# Patient Record
Sex: FEMALE | Race: WHITE | NOT HISPANIC OR LATINO | Employment: FULL TIME | URBAN - METROPOLITAN AREA
[De-identification: names, ages, dates, MRNs, and addresses within clinical notes are randomized per-mention and may not be internally consistent; named-entity substitution may affect disease eponyms.]

---

## 2017-07-12 ENCOUNTER — HOSPITAL ENCOUNTER (OUTPATIENT)
Dept: RADIOLOGY | Facility: HOSPITAL | Age: 62
Discharge: HOME | End: 2017-07-12
Payer: COMMERCIAL

## 2017-07-12 ENCOUNTER — TRANSCRIBE ORDERS (OUTPATIENT)
Dept: ADMINISTRATIVE | Facility: HOSPITAL | Age: 62
End: 2017-07-12

## 2017-07-12 DIAGNOSIS — R05.9 COUGH: Primary | ICD-10-CM

## 2017-07-12 DIAGNOSIS — R05.9 COUGH: ICD-10-CM

## 2017-07-12 PROCEDURE — 71020 HB CHEST X-RAY 2VW FRONTAL&LATL: CPT

## 2017-11-16 ENCOUNTER — APPOINTMENT (OUTPATIENT)
Dept: LAB | Facility: HOSPITAL | Age: 62
End: 2017-11-16
Payer: COMMERCIAL

## 2017-11-16 ENCOUNTER — TRANSCRIBE ORDERS (OUTPATIENT)
Dept: ADMINISTRATIVE | Facility: HOSPITAL | Age: 62
End: 2017-11-16

## 2017-11-16 DIAGNOSIS — E78.2 MIXED HYPERLIPIDEMIA: ICD-10-CM

## 2017-11-16 DIAGNOSIS — E78.2 MIXED HYPERLIPIDEMIA: Primary | ICD-10-CM

## 2017-11-16 LAB
ANION GAP SERPL CALCULATED.3IONS-SCNC: 7 MMOL/L (ref 4–13)
BUN SERPL-MCNC: 13 MG/DL (ref 5–25)
CALCIUM SERPL-MCNC: 8.8 MG/DL (ref 8.3–10.1)
CHLORIDE SERPL-SCNC: 102 MMOL/L (ref 100–108)
CHOLEST SERPL-MCNC: 215 MG/DL (ref 50–200)
CO2 SERPL-SCNC: 30 MMOL/L (ref 21–32)
CREAT SERPL-MCNC: 0.65 MG/DL (ref 0.6–1.3)
ERYTHROCYTE [DISTWIDTH] IN BLOOD BY AUTOMATED COUNT: 12.9 % (ref 11.6–15.1)
GFR SERPL CREATININE-BSD FRML MDRD: 97 ML/MIN/1.73SQ M
GLUCOSE P FAST SERPL-MCNC: 102 MG/DL (ref 65–99)
HCT VFR BLD AUTO: 38.2 % (ref 34.8–46.1)
HDLC SERPL-MCNC: 68 MG/DL (ref 40–60)
HGB BLD-MCNC: 12.4 G/DL (ref 11.5–15.4)
LDLC SERPL CALC-MCNC: 135 MG/DL (ref 0–100)
MCH RBC QN AUTO: 33.2 PG (ref 26.8–34.3)
MCHC RBC AUTO-ENTMCNC: 32.5 G/DL (ref 31.4–37.4)
MCV RBC AUTO: 102 FL (ref 82–98)
PLATELET # BLD AUTO: 219 THOUSANDS/UL (ref 149–390)
PMV BLD AUTO: 9.2 FL (ref 8.9–12.7)
POTASSIUM SERPL-SCNC: 4.2 MMOL/L (ref 3.5–5.3)
RBC # BLD AUTO: 3.74 MILLION/UL (ref 3.81–5.12)
SODIUM SERPL-SCNC: 139 MMOL/L (ref 136–145)
TRIGL SERPL-MCNC: 59 MG/DL
TSH SERPL DL<=0.05 MIU/L-ACNC: 4.75 UIU/ML (ref 0.36–3.74)
WBC # BLD AUTO: 6.5 THOUSAND/UL (ref 4.31–10.16)

## 2017-11-16 PROCEDURE — 80048 BASIC METABOLIC PNL TOTAL CA: CPT

## 2017-11-16 PROCEDURE — 80061 LIPID PANEL: CPT

## 2017-11-16 PROCEDURE — 36415 COLL VENOUS BLD VENIPUNCTURE: CPT

## 2017-11-16 PROCEDURE — 85027 COMPLETE CBC AUTOMATED: CPT

## 2017-11-16 PROCEDURE — 84443 ASSAY THYROID STIM HORMONE: CPT

## 2018-01-05 ENCOUNTER — HOSPITAL ENCOUNTER (EMERGENCY)
Facility: HOSPITAL | Age: 63
Discharge: HOME | End: 2018-01-05
Attending: EMERGENCY MEDICINE | Admitting: EMERGENCY MEDICINE
Payer: COMMERCIAL

## 2018-01-05 ENCOUNTER — APPOINTMENT (EMERGENCY)
Dept: CT IMAGING | Facility: HOSPITAL | Age: 63
End: 2018-01-05
Payer: COMMERCIAL

## 2018-01-05 ENCOUNTER — APPOINTMENT (EMERGENCY)
Dept: RADIOLOGY | Facility: HOSPITAL | Age: 63
End: 2018-01-05
Payer: COMMERCIAL

## 2018-01-05 VITALS
TEMPERATURE: 98.3 F | SYSTOLIC BLOOD PRESSURE: 132 MMHG | BODY MASS INDEX: 18.88 KG/M2 | DIASTOLIC BLOOD PRESSURE: 70 MMHG | OXYGEN SATURATION: 98 % | WEIGHT: 110 LBS | HEART RATE: 100 BPM | RESPIRATION RATE: 19 BRPM

## 2018-01-05 DIAGNOSIS — K86.89 PANCREATIC MASS: Primary | ICD-10-CM

## 2018-01-05 DIAGNOSIS — M54.9 BACK PAIN: ICD-10-CM

## 2018-01-05 LAB
ALBUMIN SERPL BCP-MCNC: 4.1 G/DL (ref 3.5–5)
ALP SERPL-CCNC: 79 U/L (ref 46–116)
ALT SERPL W P-5'-P-CCNC: 30 U/L (ref 12–78)
ANION GAP SERPL CALCULATED.3IONS-SCNC: 6 MMOL/L (ref 4–13)
AST SERPL W P-5'-P-CCNC: 25 U/L (ref 5–45)
BACTERIA UR QL AUTO: ABNORMAL /HPF
BASOPHILS # BLD AUTO: 0.03 THOUSANDS/ΜL (ref 0–0.1)
BASOPHILS NFR BLD AUTO: 0 % (ref 0–1)
BILIRUB DIRECT SERPL-MCNC: 0.15 MG/DL (ref 0–0.2)
BILIRUB SERPL-MCNC: 0.6 MG/DL (ref 0.2–1)
BILIRUB UR QL STRIP: NEGATIVE
BUN SERPL-MCNC: 12 MG/DL (ref 5–25)
CALCIUM SERPL-MCNC: 9 MG/DL (ref 8.3–10.1)
CHLORIDE SERPL-SCNC: 97 MMOL/L (ref 100–108)
CLARITY UR: CLEAR
CO2 SERPL-SCNC: 31 MMOL/L (ref 21–32)
COLOR UR: YELLOW
CREAT SERPL-MCNC: 0.68 MG/DL (ref 0.6–1.3)
EOSINOPHIL # BLD AUTO: 0.03 THOUSAND/ΜL (ref 0–0.61)
EOSINOPHIL NFR BLD AUTO: 0 % (ref 0–6)
ERYTHROCYTE [DISTWIDTH] IN BLOOD BY AUTOMATED COUNT: 12.7 % (ref 11.6–15.1)
GFR SERPL CREATININE-BSD FRML MDRD: 95 ML/MIN/1.73SQ M
GLUCOSE SERPL-MCNC: 96 MG/DL (ref 65–140)
GLUCOSE UR STRIP-MCNC: NEGATIVE MG/DL
HCT VFR BLD AUTO: 40.7 % (ref 34.8–46.1)
HGB BLD-MCNC: 13.5 G/DL (ref 11.5–15.4)
HGB UR QL STRIP.AUTO: ABNORMAL
KETONES UR STRIP-MCNC: NEGATIVE MG/DL
LEUKOCYTE ESTERASE UR QL STRIP: NEGATIVE
LIPASE SERPL-CCNC: 150 U/L (ref 73–393)
LYMPHOCYTES # BLD AUTO: 1.18 THOUSANDS/ΜL (ref 0.6–4.47)
LYMPHOCYTES NFR BLD AUTO: 10 % (ref 14–44)
MCH RBC QN AUTO: 33.5 PG (ref 26.8–34.3)
MCHC RBC AUTO-ENTMCNC: 33.2 G/DL (ref 31.4–37.4)
MCV RBC AUTO: 101 FL (ref 82–98)
MONOCYTES # BLD AUTO: 1.11 THOUSAND/ΜL (ref 0.17–1.22)
MONOCYTES NFR BLD AUTO: 10 % (ref 4–12)
NEUTROPHILS # BLD AUTO: 9.11 THOUSANDS/ΜL (ref 1.85–7.62)
NEUTS SEG NFR BLD AUTO: 80 % (ref 43–75)
NITRITE UR QL STRIP: NEGATIVE
NON-SQ EPI CELLS URNS QL MICRO: ABNORMAL /HPF
PH UR STRIP.AUTO: 5.5 [PH] (ref 4.5–8)
PLATELET # BLD AUTO: 238 THOUSANDS/UL (ref 149–390)
PMV BLD AUTO: 10.1 FL (ref 8.9–12.7)
POTASSIUM SERPL-SCNC: 4.2 MMOL/L (ref 3.5–5.3)
PROT SERPL-MCNC: 8.8 G/DL (ref 6.4–8.2)
PROT UR STRIP-MCNC: NEGATIVE MG/DL
RBC # BLD AUTO: 4.03 MILLION/UL (ref 3.81–5.12)
RBC #/AREA URNS AUTO: ABNORMAL /HPF
SODIUM SERPL-SCNC: 134 MMOL/L (ref 136–145)
SP GR UR STRIP.AUTO: 1.02 (ref 1–1.03)
UROBILINOGEN UR QL STRIP.AUTO: 0.2 E.U./DL
WBC # BLD AUTO: 11.46 THOUSAND/UL (ref 4.31–10.16)
WBC #/AREA URNS AUTO: ABNORMAL /HPF

## 2018-01-05 PROCEDURE — 96361 HYDRATE IV INFUSION ADD-ON: CPT

## 2018-01-05 PROCEDURE — 80048 BASIC METABOLIC PNL TOTAL CA: CPT | Performed by: EMERGENCY MEDICINE

## 2018-01-05 PROCEDURE — 80076 HEPATIC FUNCTION PANEL: CPT | Performed by: EMERGENCY MEDICINE

## 2018-01-05 PROCEDURE — 83690 ASSAY OF LIPASE: CPT | Performed by: EMERGENCY MEDICINE

## 2018-01-05 PROCEDURE — 74177 CT ABD & PELVIS W/CONTRAST: CPT

## 2018-01-05 PROCEDURE — 71046 X-RAY EXAM CHEST 2 VIEWS: CPT

## 2018-01-05 PROCEDURE — 96374 THER/PROPH/DIAG INJ IV PUSH: CPT

## 2018-01-05 PROCEDURE — 85025 COMPLETE CBC W/AUTO DIFF WBC: CPT | Performed by: EMERGENCY MEDICINE

## 2018-01-05 PROCEDURE — 96375 TX/PRO/DX INJ NEW DRUG ADDON: CPT

## 2018-01-05 PROCEDURE — 36415 COLL VENOUS BLD VENIPUNCTURE: CPT | Performed by: EMERGENCY MEDICINE

## 2018-01-05 PROCEDURE — 81001 URINALYSIS AUTO W/SCOPE: CPT | Performed by: EMERGENCY MEDICINE

## 2018-01-05 PROCEDURE — 99284 EMERGENCY DEPT VISIT MOD MDM: CPT

## 2018-01-05 RX ORDER — MORPHINE SULFATE 4 MG/ML
4 INJECTION, SOLUTION INTRAMUSCULAR; INTRAVENOUS ONCE
Status: COMPLETED | OUTPATIENT
Start: 2018-01-05 | End: 2018-01-05

## 2018-01-05 RX ORDER — KETOROLAC TROMETHAMINE 30 MG/ML
15 INJECTION, SOLUTION INTRAMUSCULAR; INTRAVENOUS ONCE
Status: COMPLETED | OUTPATIENT
Start: 2018-01-05 | End: 2018-01-05

## 2018-01-05 RX ORDER — FLUOXETINE HYDROCHLORIDE 40 MG/1
40 CAPSULE ORAL DAILY
COMMUNITY
End: 2018-02-05 | Stop reason: SDUPTHER

## 2018-01-05 RX ORDER — DIAZEPAM 5 MG/1
5 TABLET ORAL EVERY 12 HOURS PRN
COMMUNITY
End: 2018-01-25 | Stop reason: SDUPTHER

## 2018-01-05 RX ADMIN — IOHEXOL 100 ML: 350 INJECTION, SOLUTION INTRAVENOUS at 13:46

## 2018-01-05 RX ADMIN — SODIUM CHLORIDE 1000 ML: 0.9 INJECTION, SOLUTION INTRAVENOUS at 12:47

## 2018-01-05 RX ADMIN — MORPHINE SULFATE 4 MG: 4 INJECTION, SOLUTION INTRAMUSCULAR; INTRAVENOUS at 12:41

## 2018-01-05 RX ADMIN — KETOROLAC TROMETHAMINE 15 MG: 30 INJECTION, SOLUTION INTRAMUSCULAR at 12:43

## 2018-01-05 NOTE — DISCHARGE INSTRUCTIONS
Pancreatic Cancer   AMBULATORY CARE:   Pancreatic cancer  starts in the pancreas  The pancreas is located just behind the stomach  It helps digest food by making enzymes  The pancreas also makes hormones, such as insulin, to help balance blood sugar levels  Common symptoms include the following:   · Abdominal or low back pain     · Weight loss without trying    · Loss of appetite, nausea, or vomiting    · Fatigue and weakness    · Dark urine or light-colored bowel movements    · Jaundice (yellowing of the skin and the whites of the eyes)  Call 911 for any of the following:   · You suddenly feel lightheaded and short of breath  · You cough up blood  Seek care immediately if:   · Your arm or leg feels warm, tender, and painful  It may look swollen and red  Contact your healthcare provider if:   · Your pain does not get better, even after you take pain medicine  · Your abdomen is larger than usual     · You have new or worsening nausea or are vomiting  · You have diarrhea, light-colored or oily, foul-smelling bowel movements  · You have new or worsening weight loss, jaundice, or back pain  · You feel depressed, anxious, or unable to cope with your condition  · You have questions or concerns about your condition or care  Treatment for pancreatic cancer  may include any of the following:  · Medicines  may be given to decrease pain or other symptoms  You may need pancreatic enzyme medicine to help your body digest protein, carbohydrates, and fats in your food  You may also need insulin to control your blood sugar level  · Take your medicine as directed  Contact your healthcare provider if you think your medicine is not helping or if you have side effects  Tell him or her if you are allergic to any medicine  Keep a list of the medicines, vitamins, and herbs you take  Include the amounts, and when and why you take them  Bring the list or the pill bottles to follow-up visits   Carry your medicine list with you in case of an emergency  · Radiation therapy  uses x-rays or gamma rays to treat cancer  Radiation kills cancer cells and may stop the cancer from spreading  It is also used to reduce symptoms, such as pain  · Chemotherapy  is medicine that kills cancer cells  Chemotherapy may also be used to shrink the tumor before surgery  · Targeted therapy  is medicine given to kill cancer cells without harming healthy tissue  · Surgery  is done to remove part or all of your pancreas and lymph nodes near your pancreas  It is most often done for tumors that have not spread to other parts of the body  The kind of surgery you need will depend on the size and location of the tumor  Self-care:   · Do not smoke  Nicotine and other chemicals in cigarettes and cigars can cause lung damage  Ask your healthcare provider for information if you currently smoke and need help to quit  E-cigarettes or smokeless tobacco still contain nicotine  Talk to your healthcare provider before you use these products  · Eat small meals throughout the day  You may not feel hungry, but it is important that you eat  Proper nutrition can give you more energy, maintain your weight, and help you feel better  A dietitian can help you find ways to get enough protein, calories, vitamins, and minerals  Ask if you need to take a pancreatic enzyme supplement with meals to help with digestion  · Drink liquids as directed  Ask how much liquid to drink each day and which liquids are best for you  Drink extra liquids to prevent dehydration  You will also need to replace fluid if you are vomiting or have diarrhea from cancer treatments  · Limit or do not drink alcohol as directed  Alcohol can make it difficult to manage your symptoms or side effects of treatment  Ask your oncologist if it is safe for you to drink alcohol  Also ask how much is safe for you to drink  · Exercise as directed    Exercise may increase your energy level and appetite  Ask your healthcare provider how much exercise you need and which exercises are best for you  For more information and support: It may be difficult for you and your family to go through cancer and cancer treatments  Join a support group or talk with others who have gone through treatment  · [address]  [place]    [facility]  Phone: [phone]  Web Address: http://[employer]/  org  · [address] [place] [address]  Phone: 1- 358 - [phone]  Web Address: http://[employer]/  gov  Follow up with your oncologist as directed: You will need to return for more tests  Write down your questions so you remember to ask them during your visits  © 2017 [facility] Information is for End User's use only and may not be sold, redistributed or otherwise used for commercial purposes  All illustrations and images included in CareNotes® are the copyrighted property of A D A M , Inc  or Duran Saldaña  The above information is an  only  It is not intended as medical advice for individual conditions or treatments  Talk to your doctor, nurse or pharmacist before following any medical regimen to see if it is safe and effective for you

## 2018-01-05 NOTE — ED PROVIDER NOTES
History  Chief Complaint   Patient presents with    Back Pain     Patient states she was doing laundry yesteday and hurt her back  PCP gave her oxycontin 10mg but it is not working pain is no longer going down her legs its just across her lower back       History provided by:  Patient   used: No       patient is a 20-year-old female presenting to emergency department with lower back pain  Left-sided  Radiating to the right  Last night radiating into legs  No weakness numbness or tingling  No chest pain  No shortness of breath  No abdominal pain  Did not lose bowel or bladder control  No numbness or tingling  Able to ambulate  Patient has a history of lumbar surgery, spinal fusion, has history of osteochondromas  MDM  Check electrolytes, CBC, CT abdomen pelvis, pain control, re-evaluate      Prior to Admission Medications   Prescriptions Last Dose Informant Patient Reported? Taking? FLUoxetine (PROzac) 40 MG capsule   Yes Yes   Sig: Take 40 mg by mouth daily   diazepam (VALIUM) 5 mg tablet   Yes Yes   Sig: Take 5 mg by mouth every 12 (twelve) hours as needed for anxiety      Facility-Administered Medications: None       Past Medical History:   Diagnosis Date    Psychiatric disorder        Past Surgical History:   Procedure Laterality Date    APPENDECTOMY      BACK SURGERY      CHOLECYSTECTOMY         History reviewed  No pertinent family history  I have reviewed and agree with the history as documented  Social History   Substance Use Topics    Smoking status: Current Every Day Smoker    Smokeless tobacco: Never Used    Alcohol use No        Review of Systems   Constitutional: Negative for chills, diaphoresis and fever  HENT: Negative for congestion and sore throat  Respiratory: Negative for cough, shortness of breath, wheezing and stridor  Cardiovascular: Negative for chest pain, palpitations and leg swelling     Gastrointestinal: Negative for abdominal pain, blood in stool, diarrhea, nausea and vomiting  Genitourinary: Negative for dysuria, frequency and urgency  Musculoskeletal: Positive for back pain  Negative for neck pain and neck stiffness  Skin: Negative for pallor and rash  Neurological: Negative for dizziness, syncope, weakness, light-headedness and headaches  All other systems reviewed and are negative  Physical Exam  ED Triage Vitals   Temperature Pulse Respirations Blood Pressure SpO2   01/05/18 1200 01/05/18 1200 01/05/18 1200 01/05/18 1200 01/05/18 1200   98 3 °F (36 8 °C) (!) 117 20 116/66 96 %      Temp src Heart Rate Source Patient Position - Orthostatic VS BP Location FiO2 (%)   -- 01/05/18 1300 01/05/18 1300 01/05/18 1300 --    Monitor Lying Right arm       Pain Score       01/05/18 1200       8           Orthostatic Vital Signs  Vitals:    01/05/18 1200 01/05/18 1300 01/05/18 1400   BP: 116/66 120/60 132/70   Pulse: (!) 117 (!) 110 100   Patient Position - Orthostatic VS:  Lying Sitting       Physical Exam   Constitutional: She is oriented to person, place, and time  She appears well-developed and well-nourished  HENT:   Head: Normocephalic and atraumatic  Eyes: EOM are normal  Pupils are equal, round, and reactive to light  Neck: Normal range of motion  Neck supple  Cardiovascular: Normal rate, regular rhythm, normal heart sounds and intact distal pulses  Pulmonary/Chest: Effort normal and breath sounds normal  No respiratory distress  Abdominal: Soft  Bowel sounds are normal  There is no tenderness  Musculoskeletal: Normal range of motion  She exhibits no edema or tenderness  Neurological: She is alert and oriented to person, place, and time  No sensory deficit  She exhibits normal muscle tone  Neuro exam with normal  Limits bilateral lower extremities   Skin: Skin is warm and dry  Capillary refill takes less than 2 seconds  No rash noted  No erythema  Vitals reviewed        ED Medications  Medications ketorolac (TORADOL) injection 15 mg (15 mg Intravenous Given 1/5/18 1243)   morphine (PF) 4 mg/mL injection 4 mg (4 mg Intravenous Given 1/5/18 1241)   sodium chloride 0 9 % bolus 1,000 mL (0 mL Intravenous Stopped 1/5/18 1347)   iohexol (OMNIPAQUE) 350 MG/ML injection (SINGLE-DOSE) 100 mL (100 mL Intravenous Given 1/5/18 1346)       Diagnostic Studies  Results Reviewed     Procedure Component Value Units Date/Time    Lipase [[id number]]  (Normal) Collected:  01/05/18 1243    Lab Status:  Final result Specimen:  Blood from Arm, Left Updated:  01/05/18 1429     Lipase 150 u/L     Hepatic function panel [[id number]]  (Abnormal) Collected:  01/05/18 1243    Lab Status:  Final result Specimen:  Blood from Arm, Left Updated:  01/05/18 1429     Total Bilirubin 0 60 mg/dL      Bilirubin, Direct 0 15 mg/dL      Alkaline Phosphatase 79 U/L      AST 25 U/L      ALT 30 U/L      Total Protein 8 8 (H) g/dL      Albumin 4 1 g/dL     Urine Microscopic [[id number]]  (Abnormal) Collected:  01/05/18 1242    Lab Status:  Final result Specimen:  Urine from Urine, Clean Catch Updated:  01/05/18 1337     RBC, UA 2-4 (A) /hpf      WBC, UA None Seen /hpf      Epithelial Cells Occasional /hpf      Bacteria, UA Occasional /hpf     Basic metabolic panel [[id number]]  (Abnormal) Collected:  01/05/18 1243    Lab Status:  Final result Specimen:  Blood from Arm, Left Updated:  01/05/18 1335     Sodium 134 (L) mmol/L      Potassium 4 2 mmol/L      Chloride 97 (L) mmol/L      CO2 31 mmol/L      Anion Gap 6 mmol/L      BUN 12 mg/dL      Creatinine 0 68 mg/dL      Glucose 96 mg/dL      Calcium 9 0 mg/dL      eGFR 95 ml/min/1 73sq m     Narrative:         National Kidney Disease Education Program recommendations are as follows:  GFR calculation is accurate only with a steady state creatinine  Chronic Kidney disease less than 60 ml/min/1 73 sq  meters  Kidney failure less than 15 ml/min/1 73 sq  meters      UA w Reflex to Microscopic w Reflex to Culture [[id number]]  (Abnormal) Collected:  01/05/18 1242    Lab Status:  Final result Specimen:  Urine from Urine, Clean Catch Updated:  01/05/18 1325     Color, UA Yellow     Clarity, UA Clear     Specific Gravity, UA 1 025     pH, UA 5 5     Leukocytes, UA Negative     Nitrite, UA Negative     Protein, UA Negative mg/dl      Glucose, UA Negative mg/dl      Ketones, UA Negative mg/dl      Urobilinogen, UA 0 2 E U /dl      Bilirubin, UA Negative     Blood, UA Trace-lysed (A)    CBC and differential [[id number]]  (Abnormal) Collected:  01/05/18 1243    Lab Status:  Final result Specimen:  Blood from Arm, Left Updated:  01/05/18 1321     WBC 11 46 (H) Thousand/uL      RBC 4 03 Million/uL      Hemoglobin 13 5 g/dL      Hematocrit 40 7 %       (H) fL      MCH 33 5 pg      MCHC 33 2 g/dL      RDW 12 7 %      MPV 10 1 fL      Platelets 269 Thousands/uL      Neutrophils Relative 80 (H) %      Lymphocytes Relative 10 (L) %      Monocytes Relative 10 %      Eosinophils Relative 0 %      Basophils Relative 0 %      Neutrophils Absolute 9 11 (H) Thousands/µL      Lymphocytes Absolute 1 18 Thousands/µL      Monocytes Absolute 1 11 Thousand/µL      Eosinophils Absolute 0 03 Thousand/µL      Basophils Absolute 0 03 Thousands/µL                  CT abdomen pelvis with contrast   Final Result by Micheal Greer MD (01/05 1001)      No acute pathology appreciated to account for the back pain and abdominal pain  Incidental, but very significant finding of pancreatic tail mass concerning for carcinoma        Satisfactory appearance of postop changes of posterior L4-L5 spinal fusion             I personally discussed this study with MIK MUNOZ on 1/5/2018 2:14 PM          Workstation performed: [id number]         XR chest 2 views   Final Result by Emmy Joya MD (01/05 3568)      Emphysematous changes and bibasilar interstitial airspace opacities in a pattern suspicious for interstitial lung disease, similar from July 12, 2017   No alveolar consolidation of density to suggest acute pneumonia  Workstation performed: [id number]         MRI abdomen w wo contrast    (Results Pending)              Procedures  Procedures       Phone Contacts  ED Phone Contact    ED Course  ED Course        had a discusion with pt regarding ct scan findings suspicious for pancreatic cancer, pt aware she need to have outpatient mri and to call GI Monday morning for an office visit                        [facility]  CritCare Time    Disposition  Final diagnoses:   Pancreatic mass   Back pain     Time reflects when diagnosis was documented in both MDM as applicable and the Disposition within this note     Time User Action Codes Description Comment    1/5/2018  2:59 PM Janel Tipton [K86 9] Pancreatic mass     1/5/2018  2:59 PM Janel Tipton [M54 9] Back pain       ED Disposition     ED Disposition Condition Comment    Discharge  Maria Alejandra Osborne discharge to home/self care      Condition at discharge: Good        Follow-up Information     Follow up With Specialties Details Why Contact Info Additional [address] [facility] Emergency Department Emergency Medicine  As needed, If symptoms worsen , worsening pain, fevers, chills, vomiting or feeling worse overall [address] [place] [address]  [address]  [phone] [address] ED, [place] [address], [place], [place], MD [facility] In 3 days  re-evaluation [facility]  [address] [facility] [address]       Elinor Joseph MD Gastroenterology   call on Monday to make an appointment [address] [facility] [address] [facility]  [phone]           Discharge Medication List as of 1/5/2018  3:08 PM      CONTINUE these medications which have NOT CHANGED    Details   diazepam (VALIUM) 5 mg tablet Take 5 mg by mouth every 12 (twelve) hours as needed for anxiety, Historical Med      FLUoxetine (PROzac) 40 MG capsule Take 40 mg by mouth daily, Historical Med             Outpatient Discharge Orders  MRI abdomen w wo contrast   Standing Status: Future  Standing Exp   Date: 01/05/22         ED Provider  Electronically Signed by           Breana Carter MD  01/07/18 6525

## 2018-01-08 DIAGNOSIS — K86.9 DISEASE OF PANCREAS: ICD-10-CM

## 2018-01-09 ENCOUNTER — APPOINTMENT (OUTPATIENT)
Dept: LAB | Facility: HOSPITAL | Age: 63
End: 2018-01-09
Attending: INTERNAL MEDICINE
Payer: COMMERCIAL

## 2018-01-09 DIAGNOSIS — K86.9 DISEASE OF PANCREAS: ICD-10-CM

## 2018-01-09 PROCEDURE — 36415 COLL VENOUS BLD VENIPUNCTURE: CPT

## 2018-01-09 PROCEDURE — 86301 IMMUNOASSAY TUMOR CA 19-9: CPT

## 2018-01-10 ENCOUNTER — GENERIC CONVERSION - ENCOUNTER (OUTPATIENT)
Dept: OTHER | Facility: OTHER | Age: 63
End: 2018-01-10

## 2018-01-10 LAB — CANCER AG19-9 SERPL-ACNC: 16 U/ML (ref 0–35)

## 2018-01-15 ENCOUNTER — ANESTHESIA EVENT (OUTPATIENT)
Dept: GASTROENTEROLOGY | Facility: HOSPITAL | Age: 63
End: 2018-01-15
Payer: COMMERCIAL

## 2018-01-16 ENCOUNTER — GENERIC CONVERSION - ENCOUNTER (OUTPATIENT)
Dept: GASTROENTEROLOGY | Facility: CLINIC | Age: 63
End: 2018-01-16

## 2018-01-16 ENCOUNTER — ANESTHESIA (OUTPATIENT)
Dept: GASTROENTEROLOGY | Facility: HOSPITAL | Age: 63
End: 2018-01-16
Payer: COMMERCIAL

## 2018-01-16 ENCOUNTER — HOSPITAL ENCOUNTER (OUTPATIENT)
Facility: HOSPITAL | Age: 63
Setting detail: OUTPATIENT SURGERY
Discharge: HOME | End: 2018-01-16
Attending: INTERNAL MEDICINE | Admitting: INTERNAL MEDICINE
Payer: COMMERCIAL

## 2018-01-16 VITALS
SYSTOLIC BLOOD PRESSURE: 146 MMHG | BODY MASS INDEX: 17.24 KG/M2 | TEMPERATURE: 97.2 F | OXYGEN SATURATION: 100 % | WEIGHT: 101 LBS | HEIGHT: 64 IN | RESPIRATION RATE: 16 BRPM | DIASTOLIC BLOOD PRESSURE: 66 MMHG | HEART RATE: 87 BPM

## 2018-01-16 DIAGNOSIS — K86.9 DISEASE OF PANCREAS: ICD-10-CM

## 2018-01-16 PROCEDURE — 88172 CYTP DX EVAL FNA 1ST EA SITE: CPT | Performed by: INTERNAL MEDICINE

## 2018-01-16 PROCEDURE — 88173 CYTOPATH EVAL FNA REPORT: CPT | Performed by: INTERNAL MEDICINE

## 2018-01-16 PROCEDURE — 88305 TISSUE EXAM BY PATHOLOGIST: CPT | Performed by: INTERNAL MEDICINE

## 2018-01-16 RX ORDER — SODIUM CHLORIDE 9 MG/ML
125 INJECTION, SOLUTION INTRAVENOUS CONTINUOUS
Status: DISCONTINUED | OUTPATIENT
Start: 2018-01-16 | End: 2018-01-16 | Stop reason: HOSPADM

## 2018-01-16 RX ORDER — OXYCODONE HCL 10 MG/1
10 TABLET, FILM COATED, EXTENDED RELEASE ORAL EVERY 12 HOURS SCHEDULED
COMMUNITY
End: 2018-01-30

## 2018-01-16 RX ORDER — LEVOTHYROXINE SODIUM 0.03 MG/1
25 TABLET ORAL DAILY
COMMUNITY
End: 2018-09-11 | Stop reason: ALTCHOICE

## 2018-01-16 RX ORDER — PANTOPRAZOLE SODIUM 40 MG/1
40 TABLET, DELAYED RELEASE ORAL DAILY
COMMUNITY
End: 2018-09-11 | Stop reason: ALTCHOICE

## 2018-01-16 RX ORDER — GLYCOPYRROLATE 0.2 MG/ML
INJECTION INTRAMUSCULAR; INTRAVENOUS AS NEEDED
Status: DISCONTINUED | OUTPATIENT
Start: 2018-01-16 | End: 2018-01-16 | Stop reason: SURG

## 2018-01-16 RX ORDER — LIDOCAINE HYDROCHLORIDE 10 MG/ML
INJECTION, SOLUTION EPIDURAL; INFILTRATION; INTRACAUDAL; PERINEURAL AS NEEDED
Status: DISCONTINUED | OUTPATIENT
Start: 2018-01-16 | End: 2018-01-16 | Stop reason: SURG

## 2018-01-16 RX ORDER — PROPOFOL 10 MG/ML
INJECTION, EMULSION INTRAVENOUS CONTINUOUS PRN
Status: DISCONTINUED | OUTPATIENT
Start: 2018-01-16 | End: 2018-01-16 | Stop reason: SURG

## 2018-01-16 RX ORDER — FENTANYL CITRATE 50 UG/ML
INJECTION, SOLUTION INTRAMUSCULAR; INTRAVENOUS AS NEEDED
Status: DISCONTINUED | OUTPATIENT
Start: 2018-01-16 | End: 2018-01-16 | Stop reason: SURG

## 2018-01-16 RX ORDER — PROPOFOL 10 MG/ML
INJECTION, EMULSION INTRAVENOUS AS NEEDED
Status: DISCONTINUED | OUTPATIENT
Start: 2018-01-16 | End: 2018-01-16 | Stop reason: SURG

## 2018-01-16 RX ADMIN — LIDOCAINE HYDROCHLORIDE 50 MG: 10 INJECTION, SOLUTION EPIDURAL; INFILTRATION; INTRACAUDAL; PERINEURAL at 12:14

## 2018-01-16 RX ADMIN — FENTANYL CITRATE 25 MCG: 50 INJECTION, SOLUTION INTRAMUSCULAR; INTRAVENOUS at 12:28

## 2018-01-16 RX ADMIN — SODIUM CHLORIDE 125 ML/HR: 0.9 INJECTION, SOLUTION INTRAVENOUS at 11:43

## 2018-01-16 RX ADMIN — SODIUM CHLORIDE: 0.9 INJECTION, SOLUTION INTRAVENOUS at 12:00

## 2018-01-16 RX ADMIN — PROPOFOL 100 MCG/KG/MIN: 10 INJECTION, EMULSION INTRAVENOUS at 12:14

## 2018-01-16 RX ADMIN — FENTANYL CITRATE 25 MCG: 50 INJECTION, SOLUTION INTRAMUSCULAR; INTRAVENOUS at 12:13

## 2018-01-16 RX ADMIN — PROPOFOL 90 MG: 10 INJECTION, EMULSION INTRAVENOUS at 12:14

## 2018-01-16 RX ADMIN — GLYCOPYRROLATE 0.2 MG: 0.2 INJECTION, SOLUTION INTRAMUSCULAR; INTRAVENOUS at 12:32

## 2018-01-16 NOTE — ANESTHESIA POSTPROCEDURE EVALUATION
Post-Op Assessment Note      CV Status:  Stable    Mental Status:  Alert and awake    Hydration Status:  Euvolemic    PONV Controlled:  Controlled    Airway Patency:  Patent    Post Op Vitals Reviewed: Yes          Staff: CRNA           /63 (01/16/18 1252)    Temp     Pulse 96 (01/16/18 1252)   Resp 20 (01/16/18 1252)    SpO2 100 % (01/16/18 1252)

## 2018-01-16 NOTE — ANESTHESIA PREPROCEDURE EVALUATION
Review of Systems/Medical History  Patient summary reviewed  Chart reviewed  No history of anesthetic complications     Cardiovascular  Negative cardio ROS    Pulmonary  Smoker cigarette smoker  , Tobacco cessation counseling given , COPD ,        GI/Hepatic    GERD well controlled,   Comment: Pancreatic mass          Endo/Other  History of thyroid disease , hyperthyroidism,      GYN  Negative gynecology ROS          Hematology  Negative hematology ROS      Musculoskeletal  Back pain , lumbar pain,        Neurology  Negative neurology ROS      Psychology   Anxiety,              Physical Exam    Airway    Mallampati score: II  TM Distance: >3 FB  Neck ROM: full     Dental   No notable dental hx     Cardiovascular  Comment: Negative ROS,     Pulmonary      Other Findings        Anesthesia Plan  ASA Score- 2     Anesthesia Type- IV sedation with anesthesia with ASA Monitors  Additional Monitors:   Airway Plan:         Plan Factors-    Induction- intravenous  Postoperative Plan-     Informed Consent- Anesthetic plan and risks discussed with patient  I personally reviewed this patient with the CRNA  Discussed and agreed on the Anesthesia Plan with the MYLA Hardy

## 2018-01-16 NOTE — H&P
History and Physical -  [facility]  Wanda Espino 65 y o  female MRN: [id number]    HPI: Wanda Espino is a 65y o  year old female who presents with pancreatic tail lesion for EUS FNA  Review of Systems    Historical Information   Past Medical History:   Diagnosis Date    Anxiety     Chronic pain disorder     back    Disease of thyroid gland     GERD (gastroesophageal reflux disease)     Pancreatic mass     Psychiatric disorder     anxiety     Past Surgical History:   Procedure Laterality Date    APPENDECTOMY      BACK SURGERY      CHOLECYSTECTOMY      LEG SURGERY      right and left  osteo chondroma removed    TONSILLECTOMY       Social History   History   Alcohol Use No     History   Drug Use    Types: Marijuana     Comment: 1 month     History   Smoking Status    Current Every Day Smoker   Smokeless Tobacco    Never Used     History reviewed  No pertinent family history  Meds/Allergies     Prescriptions Prior to Admission   Medication    diazepam (VALIUM) 5 mg tablet    FLUoxetine (PROzac) 40 MG capsule    levothyroxine 25 mcg tablet    oxyCODONE (OxyCONTIN) 10 mg 12 hr tablet    pantoprazole (PROTONIX) 40 mg tablet       Allergies   Allergen Reactions    Codeine Other (See Comments)     Feels crazy when taking it       Objective     Blood pressure 130/62, pulse 93, temperature (!) 97 2 °F (36 2 °C), temperature source Tympanic, resp  rate 18, height 5' 4" (1 626 m), weight 45 8 kg (101 lb), SpO2 98 %  PHYSICAL EXAM    Gen: NAD  CV: RRR  CHEST: Clear  ABD: soft, NT/ND  EXT: no edema  Neuro: AAO      ASSESSMENT/PLAN:  This is a 65y o  year old female here for pancreatic tail lesion EUS  PLAN:   Procedure:  EUS

## 2018-01-22 ENCOUNTER — GENERIC CONVERSION - ENCOUNTER (OUTPATIENT)
Dept: OTHER | Facility: OTHER | Age: 63
End: 2018-01-22

## 2018-01-23 NOTE — RESULT NOTES
Discussion/Summary   the blood test for the tumor marker was normal - that is good  pelase have the patient scheduled for eus kelly lebron        Verified Results  (1) CA 19-9 [id number] 02:40PM Arprema Madrid    Order Number: [id number]     Test Name Result Flag Reference   CA 19-9 16 U/mL  0 - 35   Roche ECLIA methodology  Values obtained with different assay methods or kits cannot be  used interchangeably  Results cannot be interpreted as absolute  evidence of the presence or absence of malignant disease      Performed at:  [facility] [place] [address]  [id number]  : Alissa Alvarado MD, Phone:  [phone]

## 2018-01-24 PROBLEM — C25.2 MALIGNANT NEOPLASM OF TAIL OF PANCREAS (HCC): Status: ACTIVE | Noted: 2018-01-24

## 2018-01-24 PROBLEM — K86.9 PANCREATIC LESION: Status: ACTIVE | Noted: 2018-01-08

## 2018-01-24 NOTE — RESULT NOTES
Verified Results  (1) NON-GYNECOLOGIC CYTOLOGY [id number] 12:24PM Elise Barlow     Test Name Result Flag Reference   LAB AP CASE REPORT (Report)     Non-gynecologic Cytology             Case: [id number]                  Authorizing Provider: Kalli Little MD       Collected:      01/16/2018 1224        Ordering Location:   [address] [place] [address]   Received:      01/16/2018 3890 [facility]                               Pathologist:      Lorna Chahal MD                                 Specimens:  A) - Pancreas, tail                                          B) - Pancreas, tail                                          C) - Pancreas, tail, cell block   LAB AP CYTO FINAL DIAGNOSIS (Report)     A,B,C  Pancreas, tail (fine needle aspiration and cell block   preparations):    - Malignant (PSC Category VI) - See note  - Findings suggest moderately differentiated adenocarcinoma  Satisfactory for evaluation  Note: The above diagnostic category is part of the six-tiered system   proposed by The Papan[place] Society of Cytopathology for the reporting of   pancreaticobiliary specimens  This proposed scheme provides terminology   that correlates the cytologic diagnostic nomenclature with the 2010 WHO   classification of pancreatic tumors and standardizes the categorization of   the various diseases of the pancreas, some of which are difficult to   diagnose specifically by cytology  *The Papan[place] Society of Cytopathology System for Reporting   Pancreaticobiliary Cytology: Definitions, Criteria and Explanatory Notes  Patito Nobles Adjutant; [place], 4634  Electronically signed by Lorna Chahal MD on 1/18/2018 at 1:04 PM   LAB AP Carlos [address] from Dr Giovani Fernandez office notified via phone at 1:03 pm on   01/18/2018  A report was faxed that day  - Interpretation performed at [address]    - Intradepartmental consultation concurs with the diagnosis  LAB AP INTRAOPERATIVE CONSULTATION      Pancreas, tail, FNAB on-site evaluation: Markedly atypical; favor   lesional     - Dr Emily Mendez spoke with Dr Katarina Haney on 01/16/2018   LAB AP GROSS DESCRIPTION      A  Pancreas, tail: 20ml, pink, received in CytoLyt    B  Pancreas, tail: 4 slides received, ( 2 diff quik / 2 alcohol fixed )    C  Pancreas, tail, cell block: minimal cell button, white, red  LAB AP CLINICAL INFORMATION (Report)     1  Is this the first time lesion/mass is being sampled: Yes        If Yes, prior diagnosis? N/A  2  Location: TAIL  3  Size of lesion: 2 5CM  4  EUS/imaging features:        Communication with duct system: NO        Is main duct dilatation present: YES        Extra-pancreatic soft tissue extension seen: NO        Suspicious lymph nodes seen: NO  5  Any known clinical findings or significance? NONE    6  For biliary duct lesions:        Present of past stent placement or calculi: N/A   LAB AP NONGYN ADDITIONAL INFORMATION (Report)     [employer]'s FDA approved ,  and ThinPrep Imaging System are   utilized with strict adherence to the 's instruction manual to   prepare gynecologic and non-gynecologic cytology specimens for the   production of ThinPrep slides as well as for gynecologic ThinPrep imaging  These processes have been validated by our laboratory and/or by the     These tests were developed and their performance characteristics   determined by [facility] or [address] [place] [address]  They may not be cleared or approved by the [place]  Food and   Drug Administration  The FDA has determined that such clearance or   approval is not necessary  These tests are used for clinical purposes  They should not be regarded as investigational or for research  This   laboratory has been approved by [place] [address], designated as a high-complexity   laboratory and is qualified to perform these tests

## 2018-01-25 ENCOUNTER — OFFICE VISIT (OUTPATIENT)
Dept: SURGICAL ONCOLOGY | Facility: CLINIC | Age: 63
End: 2018-01-25
Payer: COMMERCIAL

## 2018-01-25 VITALS
SYSTOLIC BLOOD PRESSURE: 120 MMHG | HEART RATE: 72 BPM | DIASTOLIC BLOOD PRESSURE: 72 MMHG | TEMPERATURE: 97.8 F | BODY MASS INDEX: 17.74 KG/M2 | HEIGHT: 64 IN | RESPIRATION RATE: 16 BRPM | WEIGHT: 103.9 LBS

## 2018-01-25 DIAGNOSIS — C25.2 MALIGNANT NEOPLASM OF TAIL OF PANCREAS (HCC): Primary | ICD-10-CM

## 2018-01-25 PROCEDURE — 99245 OFF/OP CONSLTJ NEW/EST HI 55: CPT | Performed by: SURGERY

## 2018-01-25 RX ORDER — DIAZEPAM 5 MG/1
5 TABLET ORAL EVERY 12 HOURS PRN
Qty: 30 TABLET | Refills: 0 | Status: SHIPPED | OUTPATIENT
Start: 2018-01-25 | End: 2018-01-30 | Stop reason: SDUPTHER

## 2018-01-25 NOTE — PROGRESS NOTES
Surgical Oncology Follow Up       [address] [facility]  [address]  [facility] [address] [facility] [address] [phone]    Drew Prow  1955  [id number]      No chief complaint on file  Patient here for discussion regarding newly diagnosed pancreas cancer  She recently went to ER for severe (worsening) back pain; panc mass was found incidentally  Reports 10-15 lb weight loss over last several months, as well as poor appetite and epigastric pain  Denies N/V/D  No history exists  History of Present Illness:  67-year-old female who recently presented with worsening lower to mid back pain  She was seen in the ED with a CT on January 5, 2018  This revealed a mass that measured 1 7 x 1 4 x 1 5 cm in the pancreatic tail  This was concerning for carcinoma  The rest of her CT was negative for any metastasis  She then underwent endoscopic ultrasound on January 16, 2018  Pathology revealed moderately differentiated adenocarcinoma  No enlarged lymph nodes were seen on the EUS  I have personally reviewed her films  She comes in now to discuss further therapy  She still has back pain  No nausea or vomiting  She does have some early satiety  She has lost approximately 10 lb over the last several months  She also has stress from her 's recent illness as well  She does have some epigastric pain associated with fat in her stool  Review of Systems   Constitutional: Positive for appetite change and unexpected weight change  Gastrointestinal: Positive for abdominal pain (epigastric pain)  Musculoskeletal: Positive for back pain (radiates to shoulders)  Complete ROS Surg Onc:   Constitutional: The patient denies new or recent history of general fatigue, no recent weight loss, change in appetite  Eyes: No complaints of visual problems, no scleral icterus     ENT: no complaints of ear pain, no hoarseness, no difficulty swallowing,  no tinnitus and no new masses in head, oral cavity, or neck  Cardiovascular: No complaints of chest pain, no palpitations, no ankle edema  Respiratory: No complaints of shortness of breath, no cough  Gastrointestinal: No complaints of jaundice, no bloody stools, no pale stools  She does have abdominal pain  Genitourinary: No complaints of dysuria, no hematuria, no nocturia, no frequent urination, no urethral discharge  Musculoskeletal: No complaints of weakness, paralysis, joint stiffness or arthralgias  She does have back pain  Integumentary: No complaints of rash, no new lesions  Neurological: No complaints of convulsions, no seizures, no dizziness  Hematologic/Lymphatic: No complaints of easy bruising  Patient Active Problem List   Diagnosis    Malignant neoplasm of tail of pancreas ([address] )    Pancreatic lesion     Past Medical History:   Diagnosis Date    Anxiety     Chronic pain disorder     back    Disease of thyroid gland     GERD (gastroesophageal reflux disease)     Pancreatic mass     Psychiatric disorder     anxiety     Past Surgical History:   Procedure Laterality Date    APPENDECTOMY      BACK SURGERY      CHOLECYSTECTOMY      LEG SURGERY      right and left  osteo chondroma removed    LINEAR ENDOSCOPIC U/S N/A 1/16/2018    Procedure: LINEAR ENDOSCOPIC U/S;  Surgeon: Sera Croft MD;  Location: [facility]; Service: Gastroenterology    TONSILLECTOMY       No family history on file  Social History     Social History    Marital status: /Civil Union     Spouse name: N/A    Number of children: N/A    Years of education: N/A     Occupational History    Not on file       Social History Main Topics    Smoking status: Current Every Day Smoker    Smokeless tobacco: Never Used    Alcohol use No    Drug use: Yes     Types: Marijuana      Comment: 1 month    Sexual activity: Not on file     Other Topics Concern    Not on file     Social History Narrative    No narrative on file       Current Outpatient Prescriptions:     diazepam (VALIUM) 5 mg tablet, Take 5 mg by mouth every 12 (twelve) hours as needed for anxiety, Disp: , Rfl:     FLUoxetine (PROzac) 40 MG capsule, Take 40 mg by mouth daily, Disp: , Rfl:     levothyroxine 25 mcg tablet, Take 25 mcg by mouth daily, Disp: , Rfl:     oxyCODONE (OxyCONTIN) 10 mg 12 hr tablet, Take 10 mg by mouth every 12 (twelve) hours, Disp: , Rfl:     pantoprazole (PROTONIX) 40 mg tablet, Take 40 mg by mouth daily, Disp: , Rfl:   Allergies   Allergen Reactions    Codeine Other (See Comments)     Feels crazy when taking it     Vitals:    01/25/18 1339   BP: 120/72   Pulse: 72   Resp: 16   Temp: 97 8 °F (36 6 °C)       Physical Exam   Constitutional: General appearance: The Patient is well-developed and well-nourished who appears the stated age in no acute distress  Patient is pleasant and talkative  HEENT:  Normocephalic  Sclerae are anicteric  Mucous membranes are moist  Neck is supple without adenopathy  No JVD  Chest: The lungs are clear to auscultation  Cardiac: Heart is regular rate  Abdomen: Abdomen is soft, non-tender, non-distended and without masses  Extremities: There is no clubbing or cyanosis  There is no edema  Symmetric  Neuro: Grossly nonfocal  Gait is normal      Lymphatic: No evidence of cervical adenopathy bilaterally  No evidence of axillary adenopathy bilaterally  No evidence of inguinal adenopathy bilaterally  Skin: Warm, anicteric  Psych:  Patient is pleasant and talkative  Breasts:      Pathology:  Final Diagnosis   A,B,C  Pancreas, tail (fine needle aspiration and cell block preparations):     - Malignant (PSC Category VI) - See note       - Findings suggest moderately differentiated adenocarcinoma      Satisfactory for evaluation      Note: The above diagnostic category is part of the six-tiered system proposed by The Papanicolaou Society of Cytopathology for the reporting of pancreaticobiliary specimens  This proposed scheme provides terminology that correlates the cytologic diagnostic nomenclature with the 2010 WHO classification of pancreatic tumors and standardizes the categorization of the various diseases of the pancreas, some of which are difficult to diagnose specifically by cytology  *The Papanicolaou Society of Cytopathology System for Reporting Pancreaticobiliary Cytology: Definitions, Criteria and Explanatory Notes  Lilly Lawrence; Mueller, [id number]  Electronically signed by Alton Ventura MD on 1/18/2018 at  1:04 PM     CA 19-9 is 16  Imaging  Xr Chest 2 Views    Result Date: 1/5/2018  Narrative: CHEST - DUAL ENERGY INDICATION:  Cough  Fever  COMPARISON:  July 12, 2017  VIEWS:  PA (including soft tissue/bone algorithms) and lateral projections IMAGES:  4 FINDINGS:     Cardiomediastinal silhouette appears unremarkable  Emphysematous changes are noted consistent with chronic obstructive pulmonary disease  Prominent interstitial markings in the lung bases especially the costophrenic angles are similar from previous examination suspicious for interstitial lung disease  No focal airspace opacity to suggest pneumonia  No pneumothorax or pleural effusion  Visualized osseous structures appear within normal limits for the patient's age  Impression: Emphysematous changes and bibasilar interstitial airspace opacities in a pattern suspicious for interstitial lung disease, similar from July 12, 2017  No alveolar consolidation of density to suggest acute pneumonia  Workstation performed: [id number]     Ct Abdomen Pelvis With Contrast    Result Date: 1/5/2018  Narrative: CT ABDOMEN AND PELVIS WITH IV CONTRAST INDICATION:  50-year-old female with back pain radiating towards the front  COMPARISON: None  TECHNIQUE:  CT examination of the abdomen and pelvis was performed       Reformatted images were created in axial, sagittal, and coronal planes  Radiation dose length product (DLP) for this visit:  169 mGy-cm   This examination, like all CT scans performed in the [facility], was performed utilizing techniques to minimize radiation dose exposure, including the use of iterative reconstruction and automated exposure control  IV Contrast:  100 mL of iohexol (OMNIPAQUE)         Enteric Contrast:  Enteric contrast was not administered  FINDINGS: ABDOMEN LOWER CHEST:  Patient has chronic appearing interstitial lung disease affecting both lung bases probably superimposed on some element of COPD  There is peripheral interstitial thickening and reticulation, but no significant mass or nodularity seen  There is a calcified granuloma in the medial right lung base  The heart is within normal limits of size  LIVER/BILIARY TREE:  There is mild intrahepatic biliary dilatation  The common bile duct is also mildly dilated  There is no visible obstructing calculus or mass  Findings might be secondary to cholecystectomy  There are no liver masses seen  The right lobe of the liver is quite long in the craniocaudal dimension extending into the pelvis  GALLBLADDER:  Gallbladder is surgically absent  SPLEEN:  A couple tiny splenic calcifications are present suggesting old granulomatous disease  PANCREAS:  Within the tail of the pancreas there is a somewhat ill-defined hazy appearing hypodense mass which is 1 7 x 1 4 x 1 5 cm  Distal to the mass in the pancreatic tail there is mild duct dilatation  This finding is quite concerning for a possible adenocarcinoma and consultation with oncology service is suggested  Patient will presumably need further dedicated diagnostic workup of the pancreas with pancreatic MRI and probably ultimately biopsy  The location of the lesion is just posterior to the gastric body  Endoscopic ultrasound might be feasible for further visualization  ADRENAL GLANDS:  Unremarkable   KIDNEYS/URETERS: Unremarkable  No hydronephrosis  STOMACH AND BOWEL:  Unremarkable  APPENDIX:  No findings to suggest appendicitis  ABDOMINOPELVIC CAVITY:  No ascites or free intraperitoneal air  No lymphadenopathy  VESSELS:  Atherosclerotic changes are present  No evidence of aneurysm  PELVIS REPRODUCTIVE ORGANS:  Unremarkable for patient's age  URINARY BLADDER:  Unremarkable  ABDOMINAL WALL/INGUINAL REGIONS:  Unremarkable  OSSEOUS STRUCTURES:  There are postoperative changes of the lumbar spine with posterior fusion hardware at L4-L5  No complications are seen in this region  Lucent area within the right iliac bone posteriorly is felt to probably be a surgical defect  No destructive bone lesions or fractures are seen  Impression: No acute pathology appreciated to account for the back pain and abdominal pain  Incidental, but very significant finding of pancreatic tail mass concerning for carcinoma  Satisfactory appearance of postop changes of posterior L4-L5 spinal fusion  I personally discussed this study with MIK MUNOZ on 1/5/2018 2:14 PM  Workstation performed: [id number]     Us Procedure (historical)    Result Date: 1/18/2018  Narrative: Done Date: 01/16/2018 - See scanned summary    I reviewed the above laboratory and imaging data  Discussion/Summary:  58-year-old female with pancreas cancer  I recommended completing her staging with a chest CT  I also then discussed the approaches using a combination surgery, chemotherapy and/or radiation  We discussed the pros of neoadjuvant chemotherapy  These include potentially being converted to node negative as well as avoiding an operation if there is progression through chemotherapy  We also discussed the advantages of upfront surgery  She will require chemotherapy even if this is early stage pancreas cancer  After some discussion, she would rather just complete each phase of treatment at once  We elected on surgery up front    The risks of distal pancreatectomy, splenectomy, and soft tissue ablation of the pancreas were explained and included bleeding, infection, recurrence, need for further surgery, wound complications, adjacent organ injury, anastomotic leak, pancreatic fistula, sepsis, mi, DVT, stroke, pulmonary embolism, and death  Informed consent was obtained  We will schedule this at earliest mutual convenience  She is agreeable to this  We will also set her up with palliative care  I discussed this with Dr Xochitl Dickerson  I have renewed her Valium  I will start her on Creon  She will take 2 tablets with each meal and 1 with snacks  She is agreeable to this  All her questions were answered

## 2018-01-26 ENCOUNTER — TRANSCRIBE ORDERS (OUTPATIENT)
Dept: RADIOLOGY | Facility: HOSPITAL | Age: 63
End: 2018-01-26

## 2018-01-26 ENCOUNTER — HOSPITAL ENCOUNTER (OUTPATIENT)
Dept: RADIOLOGY | Facility: HOSPITAL | Age: 63
Discharge: HOME | End: 2018-01-26
Attending: SURGERY
Payer: COMMERCIAL

## 2018-01-26 PROCEDURE — 71250 CT THORAX DX C-: CPT

## 2018-01-29 PROBLEM — R64 MALIGNANT CACHEXIA (HCC): Chronic | Status: ACTIVE | Noted: 2018-01-29

## 2018-01-29 PROBLEM — C25.2 MALIGNANT NEOPLASM OF TAIL OF PANCREAS (HCC): Status: ACTIVE | Noted: 2018-01-08

## 2018-01-30 ENCOUNTER — OFFICE VISIT (OUTPATIENT)
Dept: PALLIATIVE MEDICINE | Facility: HOSPITAL | Age: 63
End: 2018-01-30
Attending: SURGERY
Payer: COMMERCIAL

## 2018-01-30 DIAGNOSIS — C25.2 MALIGNANT NEOPLASM OF TAIL OF PANCREAS (HCC): ICD-10-CM

## 2018-01-30 DIAGNOSIS — K59.03 CONSTIPATION DUE TO OPIOID THERAPY: ICD-10-CM

## 2018-01-30 DIAGNOSIS — F41.9 ANXIETY: ICD-10-CM

## 2018-01-30 DIAGNOSIS — K59.03 DRUG-INDUCED CONSTIPATION: ICD-10-CM

## 2018-01-30 DIAGNOSIS — T40.2X5A CONSTIPATION DUE TO OPIOID THERAPY: ICD-10-CM

## 2018-01-30 DIAGNOSIS — G89.3 CANCER RELATED PAIN: Primary | ICD-10-CM

## 2018-01-30 PROBLEM — F41.8 ANXIETY ABOUT HEALTH: Status: ACTIVE | Noted: 2018-01-30

## 2018-01-30 PROBLEM — F32.A DEPRESSION: Status: ACTIVE | Noted: 2018-01-30

## 2018-01-30 PROBLEM — G89.29 CHRONIC LOW BACK PAIN WITH BILATERAL SCIATICA: Status: ACTIVE | Noted: 2018-01-30

## 2018-01-30 PROBLEM — R63.4 UNINTENDED WEIGHT LOSS: Status: ACTIVE | Noted: 2018-01-30

## 2018-01-30 PROBLEM — M54.42 CHRONIC LOW BACK PAIN WITH BILATERAL SCIATICA: Status: ACTIVE | Noted: 2018-01-30

## 2018-01-30 PROBLEM — M54.41 CHRONIC LOW BACK PAIN WITH BILATERAL SCIATICA: Status: ACTIVE | Noted: 2018-01-30

## 2018-01-30 PROCEDURE — 99205 OFFICE O/P NEW HI 60 MIN: CPT | Performed by: FAMILY MEDICINE

## 2018-01-30 RX ORDER — OXYCODONE HYDROCHLORIDE 5 MG/1
5 TABLET ORAL EVERY 8 HOURS PRN
Qty: 100 TABLET | Refills: 0 | Status: SHIPPED | OUTPATIENT
Start: 2018-01-30 | End: 2018-06-26 | Stop reason: SDUPTHER

## 2018-01-30 RX ORDER — SENNA AND DOCUSATE SODIUM 50; 8.6 MG/1; MG/1
2 TABLET, FILM COATED ORAL DAILY
Qty: 7 TABLET | Refills: 0 | Status: SHIPPED | OUTPATIENT
Start: 2018-01-30 | End: 2018-09-11 | Stop reason: ALTCHOICE

## 2018-01-30 RX ORDER — LORAZEPAM 0.5 MG/1
0.5 TABLET ORAL EVERY 8 HOURS PRN
Status: DISCONTINUED | OUTPATIENT
Start: 2018-01-30 | End: 2018-01-30

## 2018-01-30 RX ORDER — DIAZEPAM 5 MG/1
5 TABLET ORAL EVERY 12 HOURS PRN
Qty: 30 TABLET | Refills: 0 | Status: SHIPPED | OUTPATIENT
Start: 2018-01-30 | End: 2018-07-17 | Stop reason: SDUPTHER

## 2018-01-30 RX ORDER — LORAZEPAM 0.5 MG/1
0.5 TABLET ORAL EVERY 8 HOURS PRN
Status: DISCONTINUED | OUTPATIENT
Start: 2018-01-30 | End: 2018-05-14

## 2018-01-30 NOTE — PROGRESS NOTES
Outpatient Consultation - Palliative and Supportive Care   Arleth Calle 65 y o  female [id number]    Assessment & Plan  1  Malignant neoplasm of tail of pancreas (HCC)    Assessment:  1  Anxiety: patient admits that even cutting valium in half makes her very sleepy and takes too long for her to feel the effects  Will start ativan 0 5mg TID prn   2  Insomnia: valium 5mg daily seems to help significantly  3  Cancer related pain/ Acute on chronic back pain: patient has been taking up to 2 tablets of oxycodone 10mgBID  Will start oxycodone 5mg TID prn  DC oxycodone 10mg prn   4  Smoking cessation: she is motivated to quit  Would like to have anxiety under control first    5  Depression: patient is currently on fluoxetine  6  Hx of drug abuse/ alcohol abuse: Quit drinking 15 years ago  She has not abused drugs in over 15 years  7  Constipation: patient has developed constipation with the addition of opioids  She has been using her 's senna-s  I have told her we will write a new prescription  Medications adjusted this encounter:  Requested Prescriptions      No prescriptions requested or ordered in this encounter     There are no discontinued medications  PPS: 80%  ECOG Performance Status: 1     Maria Alejandra was seen today for symptoms and planning cares related to above illnesses  Medications were reconciled from [employer], and above refills were sent electronically, or provided in hardcopy in clinic  The PDMP was queried, and any red flags and a plan for medication safety are noted below  We appreciate the referral, and wish for her to continue to follow with us  If there are questions or concerns, please contact us through our clinic/answering service 24 hours a day, seven days a week      [address] [place] [address] Palliative and Supportive Care          Visit Information  Accompanied By: No one  Source of History: Self  History Limitations: None  Contacts:Contact Information:  Name: Letty Kurtz  [phone], Relationship: daughter    New consultation for:  symptom management, goal of care assessment and decisional support, emotional support in the setting of serious illness    History of Present Illness  The patient is a 65year old female recently diagnosed with pancreatic cancer after presenting to the ED with worsening back pain  She underwent EUS FNA on 1/16/18 and has already established care with Dr Kraus  She has admitted to losing weight, early satiety and increased stress  Unfortunately, the patient's  was also diagnosed with cancer a few months ago and she is worried about his health  The patient has a daughter out in [place] who has asked if there is a way to have her fly out there and undergo chemo and surgery out there, so that she will be able to help the patient recuperate  She admits to increased anxiety and has been taking valium most nights per week, but explains that she is unable to take it during the day as it makes her very sleepy  She has a history of low back pain, usually rated a 2-3/10, that is sharp in nature and feels sharp when it is "flared up" and radiates down her legs  She also has thoracic/shoulder blade pain that starts in her hips and travels up  She describes it as "constant muscle spasms" She wanted to start muscle relaxants to see if this would resolve her pain, but has been told by different physicians that this is related to her pancreatic cancer  She also admits to having 3 back surgeries in the past that has progressively worsened in the past month  She had been treated with injections for some time, but has not received an injection in approximately 1 year  Her PCP has started opioids to ease her pain  She was prescribed oxycodone 10mg BID prn  She tells me that in the past month, she has been taking up to 3 tablet per day   But she was prescribed 60 tablets more than 1 month ago and still has 8 pills left in the bottle  Pertinent Palliative Care Domains    Physical Symptoms:ambulates  Psychological Symptoms:moderate anxiety  Social Aspects: financial concerns, history of drug abuse, has 2 children  Spiritual Aspects: spiritual awakening in the past 2 years    Historical Data  Past Medical History:   Diagnosis Date    Anxiety     Chronic pain disorder     back    Disease of thyroid gland     GERD (gastroesophageal reflux disease)     Pancreatic mass     Psychiatric disorder     anxiety     Past Surgical History:   Procedure Laterality Date    APPENDECTOMY      BACK SURGERY      CHOLECYSTECTOMY      LEG SURGERY      right and left  osteo chondroma removed    LINEAR ENDOSCOPIC U/S N/A 1/16/2018    Procedure: LINEAR ENDOSCOPIC U/S;  Surgeon: Fransisca Gu MD;  Location:  GI LAB; Service: Gastroenterology    TONSILLECTOMY       Social History     Social History    Marital status: /Civil Union     Spouse name: N/A    Number of children: N/A    Years of education: N/A     Occupational History    Not on file  Social History Main Topics    Smoking status: Current Every Day Smoker    Smokeless tobacco: Never Used    Alcohol use No    Drug use: Yes     Types: Marijuana      Comment: 1 month    Sexual activity: Not on file     Other Topics Concern    Not on file     Social History Narrative    No narrative on file     No family history on file  Allergies   Allergen Reactions    Codeine Other (See Comments)     Feels crazy when taking it         Review of Systems   Constitution: Positive for decreased appetite, malaise/fatigue, night sweats and weight loss  Negative for weakness  HENT: Positive for congestion  Negative for hearing loss  Eyes: Negative for visual disturbance and visual halos  Cardiovascular: Positive for dyspnea on exertion  Negative for chest pain  Respiratory: Positive for cough  Negative for shortness of breath      Endocrine: Negative for polydipsia, polyphagia and polyuria  Skin: Positive for itching  Negative for rash  Musculoskeletal: Positive for back pain  Gastrointestinal: Positive for anorexia and constipation  Negative for diarrhea, nausea and vomiting  Genitourinary: Negative for dysuria  Neurological: Negative for seizures and tremors  Psychiatric/Behavioral: Positive for depression  Negative for substance abuse  The patient has insomnia and is nervous/anxious  Allergic/Immunologic: Negative for environmental allergies  Vital Signs    LMP  (LMP Unknown)     Physical Exam and Objective Data  Physical Exam   Constitutional: She is cooperative  Non-toxic appearance  She does not have a sickly appearance  No distress  +thin frail female   HENT:   Head: Normocephalic and atraumatic  Not macrocephalic and not microcephalic  Head is without raccoon's eyes and without Maxwell's sign  Eyes: Lids are normal  Right conjunctiva is not injected  Right conjunctiva has no hemorrhage  Left conjunctiva is not injected  Left conjunctiva has no hemorrhage  No scleral icterus  Neck: Normal range of motion  Cardiovascular: Normal rate, regular rhythm, S1 normal and S2 normal     No murmur heard  Pulmonary/Chest: No apnea, no tachypnea and no bradypnea  No respiratory distress  She has no decreased breath sounds  She has no wheezes  Neurological: She is alert  Skin: She is not diaphoretic  Radiology and Laboratory:  I personally reviewed and interpreted the following results CT of the chest  60 minutes was spent face to face with patient with greater than 50% of the time spent in counseling or coordination of care including discussions of instructions for disease self management, treatment instructions and follow up requirements   All of the patient's questions were answered during this discussion

## 2018-02-05 ENCOUNTER — TELEPHONE (OUTPATIENT)
Dept: PALLIATIVE MEDICINE | Facility: HOSPITAL | Age: 63
End: 2018-02-05

## 2018-02-05 DIAGNOSIS — F32.A DEPRESSION, UNSPECIFIED DEPRESSION TYPE: Primary | ICD-10-CM

## 2018-02-05 RX ORDER — FLUOXETINE HYDROCHLORIDE 40 MG/1
40 CAPSULE ORAL DAILY
Qty: 30 CAPSULE | Refills: 1 | Status: SHIPPED | OUTPATIENT
Start: 2018-02-05 | End: 2018-06-19 | Stop reason: SDUPTHER

## 2018-04-03 DIAGNOSIS — C25.2 MALIGNANT NEOPLASM OF TAIL OF PANCREAS (HCC): ICD-10-CM

## 2018-04-04 RX ORDER — PANCRELIPASE 60000; 12000; 38000 [USP'U]/1; [USP'U]/1; [USP'U]/1
CAPSULE, DELAYED RELEASE PELLETS ORAL
Qty: 180 CAPSULE | OUTPATIENT
Start: 2018-04-04

## 2018-05-04 ENCOUNTER — OFFICE VISIT (OUTPATIENT)
Dept: HEMATOLOGY ONCOLOGY | Facility: CLINIC | Age: 63
End: 2018-05-04
Payer: COMMERCIAL

## 2018-05-04 VITALS
RESPIRATION RATE: 16 BRPM | SYSTOLIC BLOOD PRESSURE: 118 MMHG | DIASTOLIC BLOOD PRESSURE: 60 MMHG | HEIGHT: 64 IN | HEART RATE: 104 BPM | TEMPERATURE: 98.1 F | OXYGEN SATURATION: 97 % | BODY MASS INDEX: 17.75 KG/M2 | WEIGHT: 104 LBS

## 2018-05-04 DIAGNOSIS — C25.2 MALIGNANT NEOPLASM OF TAIL OF PANCREAS (HCC): Primary | ICD-10-CM

## 2018-05-04 PROCEDURE — 99245 OFF/OP CONSLTJ NEW/EST HI 55: CPT | Performed by: INTERNAL MEDICINE

## 2018-05-04 NOTE — LETTER
May 4, 2018     Miguel Hui MD  [address] [facility] [address]    Patient: Freddy Hoover   YOB: 1955   Date of Visit: 2018       Dear Dr Donald Sifuentes:    Thank you for referring Freddy Hoover to me for evaluation  Below are my notes for this consultation  If you have questions, please do not hesitate to call me  I look forward to following your patient along with you  Sincerely,        Edilma Albert MD        CC: No Recipients  Edilma Albert MD  2018  5:00 PM  Sign at close encounter     Oncology Outpatient Consult Note  Freddy Hoover 65 y o  female MRN: @ Encounter: [id number]        Date:  2018        CC:  T2 N0 M0 pancreatic cancer status post resection      HPI:  Freddy Hoover is seen for initial consultation 2018 regarding A newly resected pancreatic cancer  She was seen here at [place] [address] by our colleagues in surgical oncology  They were planning a resection of a pancreatic mass which was biopsy-proven to be adenocarcinoma  The patient had no family or support system here in the [facility] area so she decided to go to [place] where she has family to get her resection  She had her resection done and was found to have a T2 N0 M0 malignancy  Since she had no lymph nodes involved and was decided to give her 6 months of adjuvant chemotherapy with gemcitabine and Xeloda  From what she tells me she has been on Xeloda 1500 mg twice a day and gemcitabine thousand milligrams per meter square day 1 and 8 and 15  Xeloda is 2 out of 4 weeks  The gemcitabine was 3 out of 4 weeks  She got one cycle and then moved back home to [facility] so wishes to establish care  She states she has a lot of anxiety and depression issues  She lost her  a few months ago to cancer  Her father  a few years ago and she states she properly did not grieve  She is currently on Klonopin through the oncologist that was treating her in [place]   I will plug her in with our colleagues in palliative care as she had seen them in the past  She states there were having a lot of problems with her veins so we will have a port placed  She had a left ear infection within the last few weeks for which she was on antibiotics and still has some pain along with some jaw pain so I will have her see our colleagues in your nose and throat  She is going to contact her dentist to be evaluated for the left-sided jaw pain also  As far as  symptoms are concerned she states she feels depressed and anxious  She also has fogginess which she attributes to the chemotherapy  She promises she is not taking any pain medication while she drives but has taken pain medication in the past including oxycodone 5 mg  I clearly advised her she is not to drive and she is taking any of these medications  She promises she is not doing this  As far as symptoms are concerned she has some pain at the site of her surgery  Denies any nausea denies any vomiting  Denies any peeling of the hands and feet since she has received one cycle of her chemotherapy already  The rest of her 14 point review of systems today was negative  Test Results:    Imaging: No results found      Labs:   Lab Results   Component Value Date    WBC 11 46 (H) 01/05/2018    HGB 13 5 01/05/2018    HCT 40 7 01/05/2018     (H) 01/05/2018     01/05/2018     Lab Results   Component Value Date     (L) 01/05/2018    K 4 2 01/05/2018    CL 97 (L) 01/05/2018    CO2 31 01/05/2018    ANIONGAP 6 01/05/2018    BUN 12 01/05/2018    CREATININE 0 68 01/05/2018    GLUCOSE 96 01/05/2018    GLUF 102 (H) 11/16/2017    CALCIUM 9 0 01/05/2018    AST 25 01/05/2018    ALT 30 01/05/2018    ALKPHOS 79 01/05/2018    PROT 8 8 (H) 01/05/2018    BILITOT 0 60 01/05/2018    EGFR 95 01/05/2018         Lab Results   Component Value Date    [id number] 338 06/20/2016           ROS: As stated in history of present illness otherwise her 14 point review of systems today was negative  Active Problems:   Patient Active Problem List   Diagnosis    Malignant neoplasm of tail of pancreas [facility] [address]    Psychiatric disorder    Malignant cachexia ([facility] [address] [facility]    Anxiety about health    Depression    Unintended weight loss    Chronic low back pain with bilateral sciatica       Past Medical History:   Past Medical History:   Diagnosis Date    Anxiety     Chronic pain disorder     back    Disease of thyroid gland     GERD (gastroesophageal reflux disease)     Pancreatic mass     Psychiatric disorder     anxiety       Surgical History:   Past Surgical History:   Procedure Laterality Date    APPENDECTOMY      BACK SURGERY      CHOLECYSTECTOMY      LEG SURGERY      right and left  osteo chondroma removed    LINEAR ENDOSCOPIC U/S N/A 1/16/2018    Procedure: LINEAR ENDOSCOPIC U/S;  Surgeon: Ailyn Cazares MD;  Location:  GI LAB; Service: Gastroenterology    TONSILLECTOMY         Family History:  No family history on file  Cancer-related family history is not on file  Social History:   Social History     Social History    Marital status: /Civil Union     Spouse name: N/A    Number of children: N/A    Years of education: N/A     Occupational History    Not on file       Social History Main Topics    Smoking status: Current Every Day Smoker    Smokeless tobacco: Never Used    Alcohol use No    Drug use: Yes     Types: Marijuana      Comment: 1 month    Sexual activity: Not on file     Other Topics Concern    Not on file     Social History Narrative    No narrative on file       Current Medications:   Current Outpatient Prescriptions   Medication Sig Dispense Refill    diazepam (VALIUM) 5 mg tablet Take 1 tablet (5 mg total) by mouth every 12 (twelve) hours as needed for anxiety 30 tablet 0    FLUoxetine (PROzac) 40 MG capsule Take 1 capsule (40 mg total) by mouth daily 30 capsule 1    levothyroxine 25 mcg tablet Take 25 mcg by mouth daily      oxyCODONE (ROXICODONE) 5 mg immediate release tablet Take 1 tablet (5 mg total) by mouth every 8 (eight) hours as needed for moderate pain Max Daily Amount: 15 mg 100 tablet 0    pantoprazole (PROTONIX) 40 mg tablet Take 40 mg by mouth daily      pancrelipase, Lip-Prot-Amyl, (CREON) 12,000 units capsule Take 12,000 units of lipase by mouth 3 (three) times a day with meals for 180 doses 180 capsule 0    senna-docusate sodium (SENOKOT-S) 8 6-50 mg per tablet Take 2 tablets by mouth daily for 30 days 7 tablet 0     Current Facility-Administered Medications   Medication Dose Route Frequency Provider Last Rate Last Dose    LORazepam (ATIVAN) tablet 0 5 mg  0 5 mg Oral Q8H PRN Orysia Grills, DO           Allergies: Allergies   Allergen Reactions    Codeine Other (See Comments)     Feels crazy when taking it         Physical Exam:    Body surface area is 1 47 meters squared  Wt Readings from Last 3 Encounters:   05/04/18 47 2 kg (104 lb)   01/25/18 47 1 kg (103 lb 14 4 oz)   01/16/18 45 8 kg (101 lb)        Temp Readings from Last 3 Encounters:   05/04/18 98 1 °F (36 7 °C) (Tympanic)   01/25/18 97 8 °F (36 6 °C) (Oral)   01/16/18 (!) 97 2 °F (36 2 °C) (Tympanic)        BP Readings from Last 3 Encounters:   05/04/18 118/60   01/25/18 120/72   01/16/18 146/66         Pulse Readings from Last 3 Encounters:   05/04/18 104   01/25/18 72   01/16/18 87       Physical Exam     Constitutional   General appearance: No acute distress, well appearing and well nourished  Eyes   Conjunctiva and lids: No swelling, erythema or discharge  Pupils and irises: Equal, round and reactive to light  Ears, Nose, Mouth, and Throat   External inspection of ears and nose: Normal     Nasal mucosa, septum, and turbinates: Normal without edema or erythema  Oropharynx: Normal with no erythema, edema, exudate or lesions  Pulmonary   Respiratory effort: No increased work of breathing or signs of respiratory distress      Auscultation of lungs: Clear to auscultation  Cardiovascular   Palpation of heart: Normal PMI, no thrills  Auscultation of heart: Normal rate and rhythm, normal S1 and S2, without murmurs  Examination of extremities for edema and/or varicosities: Normal     Carotid pulses: Normal     Abdomen   Abdomen: Non-tender, no masses  Liver and spleen: No hepatomegaly or splenomegaly  Lymphatic   Palpation of lymph nodes in neck: No lymphadenopathy  Musculoskeletal   Gait and station: Normal     Digits and nails: Normal without clubbing or cyanosis  Inspection/palpation of joints, bones, and muscles: Normal     Skin   Skin and subcutaneous tissue: Normal without rashes or lesions  Neurologic   Cranial nerves: Cranial nerves 2-12 intact  Sensation: No sensory loss  Psychiatric   Orientation to person, place, and time: Normal     Mood and affect: Normal           Assessment/ Plan:      The patient is a pleasant 59-year-old female with newly diagnosed localized pancreatic cancer status post resection  She got one cycle of adjuvant chemotherapy in [place] prior to transferring back to the [facility]  I will continue the regimen that she was started on over there  She was apparently getting gemcitabine thousand centimeter squared day 1 and 8 and 15  She is getting xeloda 1500 mg twice a day and we will make this 2 out of 4 weeks  We will plan for 5 more cycles of treatment  Since she had no lymph nodes there was no plan to give her radiation according to her and this sounds reasonable  She will sign a consent today  She is already getting her xeloda delivered but I advised her to not start taking it until she starts gemcitabine here in [facility]  Goals and Barriers:  Current Goal:  Prolong Survival from Pancreatic Cancer  Barriers: None  Patient's Capacity to Self Care:  Patient  able to self care      Portions of the record may have been created with voice recognition software   Occasional wrong word or "sound a like" substitutions may have occurred due to the inherent limitations of voice recognition software   Read the chart carefully and recognize, using context, where substitutions have occurred

## 2018-05-04 NOTE — PROGRESS NOTES
Oncology Outpatient Consult Note  Dre Pollack 65 y o  female MRN: @ Encounter: [id number]        Date:  2018        CC:  T2 N0 M0 pancreatic cancer status post resection      HPI:  Dre Pollack is seen for initial consultation 2018 regarding A newly resected pancreatic cancer  She was seen here at [facility] by our colleagues in surgical oncology  They were planning a resection of a pancreatic mass which was biopsy-proven to be adenocarcinoma  The patient had no family or support system here in the [facility] area so she decided to go to [place] where she has family to get her resection  She had her resection done and was found to have a T2 N0 M0 malignancy  Since she had no lymph nodes involved and was decided to give her 6 months of adjuvant chemotherapy with gemcitabine and Xeloda  From what she tells me she has been on Xeloda 1500 mg twice a day and gemcitabine thousand milligrams per meter square day 1 and 8 and 15  Xeloda is 2 out of 4 weeks  The gemcitabine was 3 out of 4 weeks  She got one cycle and then moved back home to [facility] so wishes to establish care  She states she has a lot of anxiety and depression issues  She lost her  a few months ago to cancer  Her father  a few years ago and she states she properly did not grieve  She is currently on Klonopin through the oncologist that was treating her in [place]  I will plug her in with our colleagues in palliative care as she had seen them in the past  She states there were having a lot of problems with her veins so we will have a port placed  She had a left ear infection within the last few weeks for which she was on antibiotics and still has some pain along with some jaw pain so I will have her see our colleagues in your nose and throat  She is going to contact her dentist to be evaluated for the left-sided jaw pain also  As far as  symptoms are concerned she states she feels depressed and anxious   She also has fogginess which she attributes to the chemotherapy  She promises she is not taking any pain medication while she drives but has taken pain medication in the past including oxycodone 5 mg  I clearly advised her she is not to drive and she is taking any of these medications  She promises she is not doing this  As far as symptoms are concerned she has some pain at the site of her surgery  Denies any nausea denies any vomiting  Denies any peeling of the hands and feet since she has received one cycle of her chemotherapy already  The rest of her 14 point review of systems today was negative  Test Results:    Imaging: No results found  Labs:   Lab Results   Component Value Date    WBC 11 46 (H) 01/05/2018    HGB 13 5 01/05/2018    HCT 40 7 01/05/2018     (H) 01/05/2018     01/05/2018     Lab Results   Component Value Date     (L) 01/05/2018    K 4 2 01/05/2018    CL 97 (L) 01/05/2018    CO2 31 01/05/2018    ANIONGAP 6 01/05/2018    BUN 12 01/05/2018    CREATININE 0 68 01/05/2018    GLUCOSE 96 01/05/2018    GLUF 102 (H) 11/16/2017    CALCIUM 9 0 01/05/2018    AST 25 01/05/2018    ALT 30 01/05/2018    ALKPHOS 79 01/05/2018    PROT 8 8 (H) 01/05/2018    BILITOT 0 60 01/05/2018    EGFR 95 01/05/2018         Lab Results   Component Value Date    [id number] 338 06/20/2016           ROS: As stated in history of present illness otherwise her 14 point review of systems today was negative          Active Problems:   Patient Active Problem List   Diagnosis    Malignant ne[facility] tail of pancreas [facility] [address]    Psychiatric disorder    Malignant cachexia ([facility] [address]    Anxiety about health    Depression    Unintended weight loss    Chronic low back pain with bilateral sciatica       Past Medical History:   Past Medical History:   Diagnosis Date    Anxiety     Chronic pain disorder     back    Disease of thyroid gland     GERD (gastroesophageal reflux disease)     Pancreatic mass     Psychiatric disorder anxiety       Surgical History:   Past Surgical History:   Procedure Laterality Date    APPENDECTOMY      BACK SURGERY      CHOLECYSTECTOMY      LEG SURGERY      right and left  osteo chondroma removed    LINEAR ENDOSCOPIC U/S N/A 1/16/2018    Procedure: LINEAR ENDOSCOPIC U/S;  Surgeon: Terence Edwards MD;  Location: [facility]; Service: Gastroenterology    TONSILLECTOMY         Family History:  No family history on file  Cancer-related family history is not on file  Social History:   Social History     Social History    Marital status: /Civil Union     Spouse name: N/A    Number of children: N/A    Years of education: N/A     Occupational History    Not on file       Social History Main Topics    Smoking status: Current Every Day Smoker    Smokeless tobacco: Never Used    Alcohol use No    Drug use: Yes     Types: Marijuana      Comment: 1 month    Sexual activity: Not on file     Other Topics Concern    Not on file     Social History Narrative    No narrative on file       Current Medications:   Current Outpatient Prescriptions   Medication Sig Dispense Refill    diazepam (VALIUM) 5 mg tablet Take 1 tablet (5 mg total) by mouth every 12 (twelve) hours as needed for anxiety 30 tablet 0    FLUoxetine (PROzac) 40 MG capsule Take 1 capsule (40 mg total) by mouth daily 30 capsule 1    levothyroxine 25 mcg tablet Take 25 mcg by mouth daily      oxyCODONE (ROXICODONE) 5 mg immediate release tablet Take 1 tablet (5 mg total) by mouth every 8 (eight) hours as needed for moderate pain Max Daily Amount: 15 mg 100 tablet 0    pantoprazole (PROTONIX) 40 mg tablet Take 40 mg by mouth daily      pancrelipase, Lip-Prot-Amyl, (CREON) 12,000 units capsule Take 12,000 units of lipase by mouth 3 (three) times a day with meals for 180 doses 180 capsule 0    senna-docusate sodium (SENOKOT-S) 8 6-50 mg per tablet Take 2 tablets by mouth daily for 30 days 7 tablet 0     Current Facility-Administered Medications   Medication Dose Route Frequency Provider Last Rate Last Dose    LORazepam (ATIVAN) tablet 0 5 mg  0 5 mg Oral Q8H PRN Boogie Calloway DO           Allergies: Allergies   Allergen Reactions    Codeine Other (See Comments)     Feels crazy when taking it         Physical Exam:    Body surface area is 1 47 meters squared  Wt Readings from Last 3 Encounters:   05/04/18 47 2 kg (104 lb)   01/25/18 47 1 kg (103 lb 14 4 oz)   01/16/18 45 8 kg (101 lb)        Temp Readings from Last 3 Encounters:   05/04/18 98 1 °F (36 7 °C) (Tympanic)   01/25/18 97 8 °F (36 6 °C) (Oral)   01/16/18 (!) 97 2 °F (36 2 °C) (Tympanic)        BP Readings from Last 3 Encounters:   05/04/18 118/60   01/25/18 120/72   01/16/18 146/66         Pulse Readings from Last 3 Encounters:   05/04/18 104   01/25/18 72   01/16/18 87       Physical Exam     Constitutional   General appearance: No acute distress, well appearing and well nourished  Eyes   Conjunctiva and lids: No swelling, erythema or discharge  Pupils and irises: Equal, round and reactive to light  Ears, Nose, Mouth, and Throat   External inspection of ears and nose: Normal     Nasal mucosa, septum, and turbinates: Normal without edema or erythema  Oropharynx: Normal with no erythema, edema, exudate or lesions  Pulmonary   Respiratory effort: No increased work of breathing or signs of respiratory distress  Auscultation of lungs: Clear to auscultation  Cardiovascular   Palpation of heart: Normal PMI, no thrills  Auscultation of heart: Normal rate and rhythm, normal S1 and S2, without murmurs  Examination of extremities for edema and/or varicosities: Normal     Carotid pulses: Normal     Abdomen   Abdomen: Non-tender, no masses  Liver and spleen: No hepatomegaly or splenomegaly  Lymphatic   Palpation of lymph nodes in neck: No lymphadenopathy      Musculoskeletal   Gait and station: Normal     Digits and nails: Normal without clubbing or cyanosis  Inspection/palpation of joints, bones, and muscles: Normal     Skin   Skin and subcutaneous tissue: Normal without rashes or lesions  Neurologic   Cranial nerves: Cranial nerves 2-12 intact  Sensation: No sensory loss  Psychiatric   Orientation to person, place, and time: Normal     Mood and affect: Normal           Assessment/ Plan:      The patient is a pleasant 20-year-old female with newly diagnosed localized pancreatic cancer status post resection  She got one cycle of adjuvant chemotherapy in [place] prior to transferring back to the [facility]  I will continue the regimen that she was started on over there  She was apparently getting gemcitabine thousand centimeter squared day 1 and 8 and 15  She is getting xeloda 1500 mg twice a day 2 out of 4 weeks  This comes to approximately 1000 g/m²  The regimen on [facility] and in the [facility] presentation was actually 830 mg meter square twice a day  That regimen was over 3 weeks  For now I will continue her on her current regimen as this has already been started and she tolerated it well in [place]  If she has any issues or treatment delays we will obviously switch her regimen to the original regimen per the study which gives a slightly lower dose but over 3 weeks  Regardless of regimen she will get Neulasta growth factor support through this  We will plan for 5 more cycles of treatment  Since she had no lymph nodes there was no plan to give her radiation according to her and this sounds reasonable  She will sign a consent today  She is already getting her xeloda delivered but I advised her to not start taking it until she starts gemcitabine here in [facility]  Goals and Barriers:  Current Goal:  Prolong Survival from Pancreatic Cancer  Barriers: None  Patient's Capacity to Self Care:  Patient  able to self care      Portions of the record may have been created with voice recognition software   Occasional wrong word or "sound a like" substitutions may have occurred due to the inherent limitations of voice recognition software   Read the chart carefully and recognize, using context, where substitutions have occurred

## 2018-05-07 DIAGNOSIS — C25.9 PANCREATIC CARCINOMA (HCC): Primary | ICD-10-CM

## 2018-05-07 RX ORDER — CAPECITABINE 500 MG/1
1500 TABLET, FILM COATED ORAL 2 TIMES DAILY
Qty: 180 TABLET | Refills: 2 | Status: SHIPPED | OUTPATIENT
Start: 2018-05-07 | End: 2018-09-11 | Stop reason: ALTCHOICE

## 2018-05-08 ENCOUNTER — TELEPHONE (OUTPATIENT)
Dept: HEMATOLOGY ONCOLOGY | Facility: CLINIC | Age: 63
End: 2018-05-08

## 2018-05-08 ENCOUNTER — OFFICE VISIT (OUTPATIENT)
Dept: PALLIATIVE MEDICINE | Facility: HOSPITAL | Age: 63
End: 2018-05-08
Payer: COMMERCIAL

## 2018-05-08 ENCOUNTER — ANESTHESIA EVENT (OUTPATIENT)
Dept: PERIOP | Facility: AMBULARY SURGERY CENTER | Age: 63
End: 2018-05-08
Payer: COMMERCIAL

## 2018-05-08 ENCOUNTER — OFFICE VISIT (OUTPATIENT)
Dept: PALLIATIVE MEDICINE | Facility: HOSPITAL | Age: 63
End: 2018-05-08

## 2018-05-08 VITALS
DIASTOLIC BLOOD PRESSURE: 60 MMHG | WEIGHT: 102 LBS | RESPIRATION RATE: 20 BRPM | HEART RATE: 80 BPM | TEMPERATURE: 97.7 F | SYSTOLIC BLOOD PRESSURE: 112 MMHG | BODY MASS INDEX: 17.79 KG/M2

## 2018-05-08 DIAGNOSIS — C25.2 MALIGNANT NEOPLASM OF TAIL OF PANCREAS (HCC): Primary | ICD-10-CM

## 2018-05-08 DIAGNOSIS — F41.1 ANXIETY AS ACUTE REACTION TO EXCEPTIONAL STRESS: Primary | ICD-10-CM

## 2018-05-08 DIAGNOSIS — M26.609 TMJ (TEMPOROMANDIBULAR JOINT SYNDROME): ICD-10-CM

## 2018-05-08 DIAGNOSIS — F32.9 REACTIVE DEPRESSION: ICD-10-CM

## 2018-05-08 DIAGNOSIS — F43.0 ANXIETY AS ACUTE REACTION TO EXCEPTIONAL STRESS: Primary | ICD-10-CM

## 2018-05-08 DIAGNOSIS — F41.8 ANXIETY ABOUT HEALTH: ICD-10-CM

## 2018-05-08 PROCEDURE — 99214 OFFICE O/P EST MOD 30 MIN: CPT | Performed by: FAMILY MEDICINE

## 2018-05-08 PROCEDURE — 99499 UNLISTED E&M SERVICE: CPT

## 2018-05-08 RX ORDER — DIVALPROEX SODIUM 125 MG/1
TABLET, DELAYED RELEASE ORAL
Qty: 60 TABLET | Refills: 1 | Status: SHIPPED | OUTPATIENT
Start: 2018-05-08 | End: 2018-05-14 | Stop reason: SDUPTHER

## 2018-05-08 NOTE — TELEPHONE ENCOUNTER
Chandan Shane from [facility] [place] called to help this patient reach someone in Dr Suly Kathleen office  The patient has questions about how long she is to hold her oral chemo, when to get a port, and what the next steps are  Please call the patient to let her know the plan and instructions

## 2018-05-08 NOTE — TELEPHONE ENCOUNTER
She is scheduled for treatment correct? Can we call and confirm with her? She does not appear to know a schedule for anything  She is to start her Xeloda on D1 of her cycle same as back in [place]

## 2018-05-08 NOTE — PROGRESS NOTES
Assessment/Plan:    No problem-specific Assessment & Plan notes found for this encounter  Diagnoses and all orders for this visit:    Malignant neoplasm of tail of pancreas (HCC)    Anxiety about health  -     divalproex sodium (DEPAKOTE) 125 mg EC tablet; 1 po bid    Reactive depression  -     divalproex sodium (DEPAKOTE) 125 mg EC tablet; 1 po bid    TMJ (temporomandibular joint syndrome)  -     divalproex sodium (DEPAKOTE) 125 mg EC tablet; 1 po bid          Subjective:      Patient ID: Dre Pollack is a 65 y o  female  Anny Olyaer returns in outpatient follow-up  She has not been seen since January of 2018 after she was diagnosed with pancreatic cancer and moved to [place] with her daughter to pursue treatment with surgical resection and chemotherapy  She has now returned to [place] and is attempting to establish oncologic care and re-institute her chemotherapy  She has seen oncology but needs to have a Port placed  She is here today to reconnect with palliative care and to seek support from MSW  She notes significant anxiety and feelings of depression and being overwhelmed  She has persistent pain in her left ear and has had several treatments for ear infection on that side  She is able to relate things of interest and pleasure and she firmly denies any suicidal thoughts  She wants to feel better  She has been on fluoxetine for several years  She is not actively seeing a therapist or psychiatrist and would prefer not to see a psychiatrist  Her left ear pain is worse with opening her jaw  She has oxycodone for pain but uses it rarely  She takes clonazepam for anxiety but does not feel that it is effective and that lorazepam has been more effective in the past  PDMP query shows no concerns  She acknowledges the losses she has experienced with the death of her  while she was in [place], her cancer and the impact on her life          The following portions of the patient's history were reviewed and updated as appropriate: allergies, current medications, past family history, past medical history, past social history, past surgical history and problem list     Review of Systems   Constitutional: Positive for activity change, appetite change and fatigue  Negative for chills, diaphoresis, fever and unexpected weight change  HENT: Positive for ear pain  Negative for dental problem and ear discharge  Eyes: Negative  Respiratory: Negative  Cardiovascular: Negative  Gastrointestinal: Positive for abdominal pain  Negative for abdominal distention, anal bleeding, blood in stool, constipation, diarrhea, nausea, rectal pain and vomiting  Endocrine: Negative  Genitourinary: Negative  Musculoskeletal: Positive for back pain  Skin: Negative  Allergic/Immunologic: Negative  Neurological: Negative  Hematological: Negative  Psychiatric/Behavioral: Positive for decreased concentration, dysphoric mood and sleep disturbance  Negative for confusion, hallucinations, self-injury and suicidal ideas  The patient is nervous/anxious  The patient is not hyperactive  Objective:      /60 (BP Location: Left arm, Patient Position: Sitting, Cuff Size: Standard)   Pulse 80   Temp 97 7 °F (36 5 °C) (Oral)   Resp 20   Wt 46 3 kg (102 lb)   LMP  (LMP Unknown)   BMI 17 79 kg/m²          Physical Exam   Constitutional: She is oriented to person, place, and time  She appears well-developed and well-nourished  She appears distressed  HENT:   Head: Normocephalic and atraumatic  Right Ear: External ear normal    Left Ear: External ear normal    Nose: Nose normal    Mouth/Throat: Oropharynx is clear and moist  No oropharyngeal exudate  Eyes: Conjunctivae and EOM are normal  Pupils are equal, round, and reactive to light  Right eye exhibits no discharge  Left eye exhibits no discharge  No scleral icterus  Neck: Normal range of motion  Neck supple  No JVD present   No tracheal deviation present  No thyromegaly present  Cardiovascular: Normal rate, regular rhythm, normal heart sounds and intact distal pulses  Exam reveals no gallop and no friction rub  No murmur heard  Pulmonary/Chest: Effort normal and breath sounds normal  No stridor  No respiratory distress  She has no wheezes  She has no rales  She exhibits no tenderness  Abdominal: Soft  Bowel sounds are normal  She exhibits no distension and no mass  There is tenderness  There is no rebound and no guarding  Musculoskeletal: Normal range of motion  She exhibits no edema, tenderness or deformity  Lymphadenopathy:     She has no cervical adenopathy  Neurological: She is alert and oriented to person, place, and time  She has normal reflexes  She displays normal reflexes  No cranial nerve deficit  She exhibits normal muscle tone  Coordination normal    Skin: Skin is warm and dry  No rash noted  She is not diaphoretic  No erythema  No pallor  Psychiatric: Her mood appears anxious  Her affect is labile  Her speech is rapid and/or pressured  She is agitated  Thought content is not paranoid and not delusional  Cognition and memory are not impaired  She expresses impulsivity  She does not express inappropriate judgment  She exhibits a depressed mood  She expresses no homicidal and no suicidal ideation  She expresses no suicidal plans and no homicidal plans  She exhibits normal recent memory and normal remote memory  Vitals reviewed

## 2018-05-08 NOTE — PROGRESS NOTES
Martha Chan presents today for a follow up from January  Pt  Has unfortunately endured many changes since her first visit  Her , David Snyder (also a [facility] pt )  of lung cancer in February  Within days after his death, she travelled to [place] for a planned Whipple procedure for her pancreatic cancer  She recovered at her daughter's home and began chemo there, however she felt she needed to return to [place] "because of her insurance "  Martha Chan states her insurance terms at the end of May  Offered to help re-apply for MA and SSI if she is interested  She appears to be very overwhelmed with coming back to [place], facing Harris's death and facing her own health issues  Will attempt to compartmentalize her issues and concerns in upcoming visits  She requested help straightening out her medications and she endorses L ear and jaw pain  Dr Char Vides assessed her ear and stated he did not see an infection  He suggested she may have TMJ  Acupuncture initiated  Martha Chan was very anxious, tearful and elevated  Emotional support and therapeutic listening provided  Deep breathing exercise performed to help calm her  Validated pt's grief and tearfulness  Provided bereavement services offered through [id number] [id number]  Martha Chan also admitted she was diagnosed with a "multiple personality disorder" about 30 years ago and was treated with therapy  She is fearful these symptoms will come back  She is not interested in seeking outpatient behavioral health at this time  Pt  Will f/u in 1 week  LSW updated Maura Amezquita, [address] [facility] debbie Bess's case  Will provide support in collaboration to pt

## 2018-05-09 ENCOUNTER — TELEPHONE (OUTPATIENT)
Dept: HEMATOLOGY ONCOLOGY | Facility: CLINIC | Age: 63
End: 2018-05-09

## 2018-05-09 NOTE — TELEPHONE ENCOUNTER
Returned phone call to patient and left a voice mail that Xeloda will start on 5/18 D1 of her next cycle of Gemzar

## 2018-05-10 NOTE — PATIENT INSTRUCTIONS
Will continue her fluoxetine  Stop clonazeapm and use lorazepam as needed for anxiety  Add Depakote 125 mg bid for jaw pain due to TMJ and for mood stabilization  Continue to work with MSW  Return 1 week

## 2018-05-11 ENCOUNTER — TELEPHONE (OUTPATIENT)
Dept: HEMATOLOGY ONCOLOGY | Facility: CLINIC | Age: 63
End: 2018-05-11

## 2018-05-11 ENCOUNTER — APPOINTMENT (OUTPATIENT)
Dept: LAB | Facility: HOSPITAL | Age: 63
End: 2018-05-11
Attending: INTERNAL MEDICINE
Payer: COMMERCIAL

## 2018-05-11 ENCOUNTER — TRANSCRIBE ORDERS (OUTPATIENT)
Dept: ADMINISTRATIVE | Facility: HOSPITAL | Age: 63
End: 2018-05-11

## 2018-05-11 LAB
ALBUMIN SERPL BCP-MCNC: 3.4 G/DL (ref 3.5–5)
ALP SERPL-CCNC: 101 U/L (ref 46–116)
ALT SERPL W P-5'-P-CCNC: 23 U/L (ref 12–78)
ANION GAP SERPL CALCULATED.3IONS-SCNC: 10 MMOL/L (ref 4–13)
AST SERPL W P-5'-P-CCNC: 20 U/L (ref 5–45)
BASOPHILS # BLD MANUAL: 0 THOUSAND/UL (ref 0–0.1)
BASOPHILS NFR MAR MANUAL: 0 % (ref 0–1)
BILIRUB SERPL-MCNC: 0.2 MG/DL (ref 0.2–1)
BUN SERPL-MCNC: 18 MG/DL (ref 5–25)
CALCIUM SERPL-MCNC: 9 MG/DL (ref 8.3–10.1)
CHLORIDE SERPL-SCNC: 103 MMOL/L (ref 100–108)
CO2 SERPL-SCNC: 26 MMOL/L (ref 21–32)
CREAT SERPL-MCNC: 0.6 MG/DL (ref 0.6–1.3)
EOSINOPHIL # BLD MANUAL: 0.1 THOUSAND/UL (ref 0–0.4)
EOSINOPHIL NFR BLD MANUAL: 1 % (ref 0–6)
ERYTHROCYTE [DISTWIDTH] IN BLOOD BY AUTOMATED COUNT: 17.3 % (ref 11.6–15.1)
GFR SERPL CREATININE-BSD FRML MDRD: 99 ML/MIN/1.73SQ M
GLUCOSE P FAST SERPL-MCNC: 100 MG/DL (ref 65–99)
HCT VFR BLD AUTO: 33.9 % (ref 34.8–46.1)
HGB BLD-MCNC: 11.1 G/DL (ref 11.5–15.4)
HOWELL-JOLLY BOD BLD QL SMEAR: PRESENT
LYMPHOCYTES # BLD AUTO: 1.65 THOUSAND/UL (ref 0.6–4.47)
LYMPHOCYTES # BLD AUTO: 16 % (ref 14–44)
MACROCYTES BLD QL AUTO: PRESENT
MCH RBC QN AUTO: 34.7 PG (ref 26.8–34.3)
MCHC RBC AUTO-ENTMCNC: 32.7 G/DL (ref 31.4–37.4)
MCV RBC AUTO: 106 FL (ref 82–98)
MONOCYTES # BLD AUTO: 0.82 THOUSAND/UL (ref 0–1.22)
MONOCYTES NFR BLD: 8 % (ref 4–12)
NEUTROPHILS # BLD MANUAL: 7.53 THOUSAND/UL (ref 1.85–7.62)
NEUTS BAND NFR BLD MANUAL: 2 % (ref 0–8)
NEUTS SEG NFR BLD AUTO: 71 % (ref 43–75)
NRBC BLD AUTO-RTO: 1 /100 WBC (ref 0–2)
PLATELET # BLD AUTO: 319 THOUSANDS/UL (ref 149–390)
PLATELET BLD QL SMEAR: ADEQUATE
PMV BLD AUTO: 9.9 FL (ref 8.9–12.7)
POLYCHROMASIA BLD QL SMEAR: PRESENT
POTASSIUM SERPL-SCNC: 4.2 MMOL/L (ref 3.5–5.3)
PROT SERPL-MCNC: 7.5 G/DL (ref 6.4–8.2)
RBC # BLD AUTO: 3.2 MILLION/UL (ref 3.81–5.12)
RBC MORPH BLD: PRESENT
SODIUM SERPL-SCNC: 139 MMOL/L (ref 136–145)
TOTAL CELLS COUNTED SPEC: 100
VARIANT LYMPHS # BLD AUTO: 2 %
WBC # BLD AUTO: 10.31 THOUSAND/UL (ref 4.31–10.16)

## 2018-05-11 PROCEDURE — 80053 COMPREHEN METABOLIC PANEL: CPT | Performed by: INTERNAL MEDICINE

## 2018-05-11 PROCEDURE — 86301 IMMUNOASSAY TUMOR CA 19-9: CPT | Performed by: INTERNAL MEDICINE

## 2018-05-11 PROCEDURE — 85007 BL SMEAR W/DIFF WBC COUNT: CPT | Performed by: INTERNAL MEDICINE

## 2018-05-11 PROCEDURE — 36415 COLL VENOUS BLD VENIPUNCTURE: CPT | Performed by: INTERNAL MEDICINE

## 2018-05-11 PROCEDURE — 85027 COMPLETE CBC AUTOMATED: CPT | Performed by: INTERNAL MEDICINE

## 2018-05-11 NOTE — TELEPHONE ENCOUNTER
Received call from Dr Lamar Cheek, oral surgeon, checking on recent labs for pt prior to pulling a tooth  Nothing in the system since January  He states uncomfortable pulling a tooth with labs  Informed pt has orders in system and to instruct her to get done  Spoke with pt this afternoon and reports did go for labs

## 2018-05-13 LAB — CANCER AG19-9 SERPL-ACNC: 33 U/ML (ref 0–35)

## 2018-05-14 ENCOUNTER — OFFICE VISIT (OUTPATIENT)
Dept: FAMILY MEDICINE CLINIC | Facility: HOSPITAL | Age: 63
End: 2018-05-14
Payer: COMMERCIAL

## 2018-05-14 VITALS
WEIGHT: 103.5 LBS | SYSTOLIC BLOOD PRESSURE: 130 MMHG | HEART RATE: 105 BPM | OXYGEN SATURATION: 95 % | DIASTOLIC BLOOD PRESSURE: 64 MMHG | BODY MASS INDEX: 17.67 KG/M2 | HEIGHT: 64 IN

## 2018-05-14 DIAGNOSIS — Z00.00 HEALTH CARE MAINTENANCE: Primary | ICD-10-CM

## 2018-05-14 DIAGNOSIS — M26.609 TMJ (TEMPOROMANDIBULAR JOINT SYNDROME): ICD-10-CM

## 2018-05-14 DIAGNOSIS — F32.9 REACTIVE DEPRESSION: ICD-10-CM

## 2018-05-14 DIAGNOSIS — F41.8 ANXIETY ABOUT HEALTH: ICD-10-CM

## 2018-05-14 DIAGNOSIS — C25.2 MALIGNANT NEOPLASM OF TAIL OF PANCREAS (HCC): ICD-10-CM

## 2018-05-14 PROCEDURE — 99386 PREV VISIT NEW AGE 40-64: CPT | Performed by: INTERNAL MEDICINE

## 2018-05-14 RX ORDER — DIVALPROEX SODIUM 125 MG/1
TABLET, DELAYED RELEASE ORAL
Qty: 60 TABLET | Refills: 0 | Status: SHIPPED | OUTPATIENT
Start: 2018-05-14 | End: 2018-07-03 | Stop reason: SDUPTHER

## 2018-05-14 RX ORDER — AMOXICILLIN 875 MG/1
875 TABLET, COATED ORAL 2 TIMES DAILY
Refills: 0 | COMMUNITY
Start: 2018-05-10 | End: 2018-05-31 | Stop reason: ALTCHOICE

## 2018-05-14 NOTE — PROGRESS NOTES
Assessment/Plan:    Health care maintenance  Establish care    Depression  meds started by palliative care for this     Anxiety about health  Chronic and ongoing    Malignant neoplasm of tail of pancreas ([facility] [address] )  Mgmt as per oncology    Patient Active Problem List   Diagnosis    Malignant neoplasm of tail of pancreas ([facility] [address] )    Psychiatric disorder    Malignant cachexia ([facility] [address] )    Anxiety about health    Depression    Unintended weight loss    Chronic low back pain with bilateral sciatica    TMJ (temporomandibular joint syndrome)    Health care maintenance     No orders of the defined types were placed in this encounter  Diagnoses and all orders for this visit:    Health care maintenance    Reactive depression  -     divalproex sodium (DEPAKOTE) 125 mg EC tablet; 1 po bid    Malignant neoplasm of tail of pancreas (HCC)    Anxiety about health  -     divalproex sodium (DEPAKOTE) 125 mg EC tablet; 1 po bid    TMJ (temporomandibular joint syndrome)  -     divalproex sodium (DEPAKOTE) 125 mg EC tablet; 1 po bid    Other orders  -     amoxicillin (AMOXIL) 875 mg tablet; Take 875 mg by mouth 2 (two) times a day          Subjective:      Patient ID: Galilea Rendon is a 65 y o  female  Here to establish,  Seeing oncology for pancreatic Ca and seeing palliative care for depression, anxiety issues    Former Dr Rina Chaudhari patient  ONC treatment plan is for port placement tomorrow for ongoing chemotx  The following portions of the patient's history were reviewed and updated as appropriate: allergies, current medications, past family history, past medical history, past social history, past surgical history and problem list     Review of Systems   Constitutional: Positive for activity change and fever  Negative for fatigue  HENT: Negative for hearing loss  Eyes: Negative for visual disturbance  Respiratory: Negative for cough, chest tightness, shortness of breath and wheezing  Cardiovascular: Negative for chest pain, palpitations and leg swelling  Gastrointestinal: Negative for abdominal pain, diarrhea and nausea  Genitourinary: Negative for dysuria and hematuria  Musculoskeletal: Negative for arthralgias  Neurological: Negative for dizziness, numbness and headaches  Psychiatric/Behavioral: Negative for confusion  Dysphoric mood: anxiety and stress  All other systems reviewed and are negative  Objective:      Current Outpatient Prescriptions:     amoxicillin (AMOXIL) 875 mg tablet, Take 875 mg by mouth 2 (two) times a day, Disp: , Rfl: 0    diazepam (VALIUM) 5 mg tablet, Take 1 tablet (5 mg total) by mouth every 12 (twelve) hours as needed for anxiety, Disp: 30 tablet, Rfl: 0    FLUoxetine (PROzac) 40 MG capsule, Take 1 capsule (40 mg total) by mouth daily, Disp: 30 capsule, Rfl: 1    levothyroxine 25 mcg tablet, Take 25 mcg by mouth daily, Disp: , Rfl:     oxyCODONE (ROXICODONE) 5 mg immediate release tablet, Take 1 tablet (5 mg total) by mouth every 8 (eight) hours as needed for moderate pain Max Daily Amount: 15 mg, Disp: 100 tablet, Rfl: 0    pancrelipase, Lip-Prot-Amyl, (CREON) 12,000 units capsule, Take 12,000 units of lipase by mouth 3 (three) times a day with meals for 180 doses, Disp: 180 capsule, Rfl: 0    pantoprazole (PROTONIX) 40 mg tablet, Take 40 mg by mouth daily, Disp: , Rfl:     capecitabine (XELODA) 500 MG tablet, Take 3 tablets (1,500 mg total) by mouth 2 (two) times a day, Disp: 180 tablet, Rfl: 2    divalproex sodium (DEPAKOTE) 125 mg EC tablet, 1 po bid, Disp: 60 tablet, Rfl: 0    senna-docusate sodium (SENOKOT-S) 8 6-50 mg per tablet, Take 2 tablets by mouth daily for 30 days, Disp: 7 tablet, Rfl: 0  No current facility-administered medications for this visit  Physical Exam   Constitutional: She is oriented to person, place, and time  She appears well-developed and well-nourished     Eyes: Pupils are equal, round, and reactive to light    Neck: Normal range of motion  Neck supple  No thyromegaly present  Cardiovascular: Normal rate, regular rhythm, normal heart sounds and intact distal pulses  No murmur heard  Pulmonary/Chest: Effort normal and breath sounds normal  She has no wheezes  She has no rales  Abdominal: Soft  Bowel sounds are normal  There is no tenderness  Musculoskeletal: Normal range of motion  She exhibits no edema, tenderness or deformity  Lymphadenopathy:     She has no cervical adenopathy  Neurological: She is alert and oriented to person, place, and time  She has normal reflexes  Skin: Skin is warm and dry  Psychiatric: She has a normal mood and affect  Nursing note and vitals reviewed

## 2018-05-14 NOTE — PROGRESS NOTES
Assessment/Plan:    Health care maintenance  Establish care    Depression  meds started by palliative care for this     Anxiety about health  Chronic and ongoing    Malignant neoplasm of tail of pancreas ([facility] [address] )  Mgmt as per oncology    Patient Active Problem List   Diagnosis    Malignant neoplasm of tail of pancreas ([facility] [address] )    Psychiatric disorder    Malignant cachexia ([facility] [address] )    Anxiety about health    Depression    Unintended weight loss    Chronic low back pain with bilateral sciatica    TMJ (temporomandibular joint syndrome)    Health care maintenance     No orders of the defined types were placed in this encounter  Diagnoses and all orders for this visit:    Health care maintenance    Reactive depression  -     divalproex sodium (DEPAKOTE) 125 mg EC tablet; 1 po bid    Malignant neoplasm of tail of pancreas (HCC)    Anxiety about health  -     divalproex sodium (DEPAKOTE) 125 mg EC tablet; 1 po bid    TMJ (temporomandibular joint syndrome)  -     divalproex sodium (DEPAKOTE) 125 mg EC tablet; 1 po bid    Other orders  -     amoxicillin (AMOXIL) 875 mg tablet; Take 875 mg by mouth 2 (two) times a day          Subjective:      Patient ID: Dina Dee is a 65 y o  female      HPI    The following portions of the patient's history were reviewed and updated as appropriate: allergies, current medications, past family history, past medical history, past social history, past surgical history and problem list     Review of Systems      Objective:      Current Outpatient Prescriptions:     amoxicillin (AMOXIL) 875 mg tablet, Take 875 mg by mouth 2 (two) times a day, Disp: , Rfl: 0    diazepam (VALIUM) 5 mg tablet, Take 1 tablet (5 mg total) by mouth every 12 (twelve) hours as needed for anxiety, Disp: 30 tablet, Rfl: 0    FLUoxetine (PROzac) 40 MG capsule, Take 1 capsule (40 mg total) by mouth daily, Disp: 30 capsule, Rfl: 1    levothyroxine 25 mcg tablet, Take 25 mcg by mouth daily, Disp: , Rfl:    oxyCODONE (ROXICODONE) 5 mg immediate release tablet, Take 1 tablet (5 mg total) by mouth every 8 (eight) hours as needed for moderate pain Max Daily Amount: 15 mg, Disp: 100 tablet, Rfl: 0    pancrelipase, Lip-Prot-Amyl, (CREON) 12,000 units capsule, Take 12,000 units of lipase by mouth 3 (three) times a day with meals for 180 doses, Disp: 180 capsule, Rfl: 0    pantoprazole (PROTONIX) 40 mg tablet, Take 40 mg by mouth daily, Disp: , Rfl:     capecitabine (XELODA) 500 MG tablet, Take 3 tablets (1,500 mg total) by mouth 2 (two) times a day, Disp: 180 tablet, Rfl: 2    divalproex sodium (DEPAKOTE) 125 mg EC tablet, 1 po bid, Disp: 60 tablet, Rfl: 0    senna-docusate sodium (SENOKOT-S) 8 6-50 mg per tablet, Take 2 tablets by mouth daily for 30 days, Disp: 7 tablet, Rfl: 0  No current facility-administered medications for this visit        Physical Exam

## 2018-05-14 NOTE — PATIENT INSTRUCTIONS
Your visit today was to establish a relationship with our office and to establish care  We hope that you feel welcomed and confident that we will address your health care needs as needed  The doctor or nurse reviewed your health information with you, all medications and some family and social history  If the doctor has ordered labs or tests, please do this soon and then schedule a follow up visit as requested

## 2018-05-15 ENCOUNTER — HOSPITAL ENCOUNTER (OUTPATIENT)
Facility: AMBULARY SURGERY CENTER | Age: 63
Setting detail: OUTPATIENT SURGERY
Discharge: HOME | End: 2018-05-15
Attending: RADIOLOGY | Admitting: RADIOLOGY
Payer: COMMERCIAL

## 2018-05-15 ENCOUNTER — ANESTHESIA (OUTPATIENT)
Dept: PERIOP | Facility: AMBULARY SURGERY CENTER | Age: 63
End: 2018-05-15
Payer: COMMERCIAL

## 2018-05-15 ENCOUNTER — APPOINTMENT (OUTPATIENT)
Dept: RADIOLOGY | Facility: AMBULARY SURGERY CENTER | Age: 63
End: 2018-05-15
Payer: COMMERCIAL

## 2018-05-15 VITALS
HEART RATE: 86 BPM | SYSTOLIC BLOOD PRESSURE: 115 MMHG | OXYGEN SATURATION: 99 % | RESPIRATION RATE: 16 BRPM | BODY MASS INDEX: 18.43 KG/M2 | WEIGHT: 104 LBS | DIASTOLIC BLOOD PRESSURE: 57 MMHG | TEMPERATURE: 98 F | HEIGHT: 63 IN

## 2018-05-15 PROCEDURE — 76937 US GUIDE VASCULAR ACCESS: CPT | Performed by: RADIOLOGY

## 2018-05-15 PROCEDURE — 77001 FLUOROGUIDE FOR VEIN DEVICE: CPT | Performed by: RADIOLOGY

## 2018-05-15 PROCEDURE — 77001 FLUOROGUIDE FOR VEIN DEVICE: CPT

## 2018-05-15 PROCEDURE — C1788 PORT, INDWELLING, IMP: HCPCS | Performed by: RADIOLOGY

## 2018-05-15 PROCEDURE — 36561 INSERT TUNNELED CV CATH: CPT | Performed by: RADIOLOGY

## 2018-05-15 DEVICE — PORT DIGINTY 8FR MICRO-STICK KIT HEMODIAL MID SIZE: Type: IMPLANTABLE DEVICE | Site: CHEST | Status: FUNCTIONAL

## 2018-05-15 RX ORDER — PROPOFOL 10 MG/ML
INJECTION, EMULSION INTRAVENOUS CONTINUOUS PRN
Status: DISCONTINUED | OUTPATIENT
Start: 2018-05-15 | End: 2018-05-15 | Stop reason: SURG

## 2018-05-15 RX ORDER — SODIUM CHLORIDE 9 MG/ML
INJECTION, SOLUTION INTRAVENOUS CONTINUOUS PRN
Status: DISCONTINUED | OUTPATIENT
Start: 2018-05-15 | End: 2018-05-15 | Stop reason: SURG

## 2018-05-15 RX ORDER — SODIUM CHLORIDE 9 MG/ML
100 INJECTION, SOLUTION INTRAVENOUS CONTINUOUS
Status: DISCONTINUED | OUTPATIENT
Start: 2018-05-15 | End: 2018-05-15 | Stop reason: HOSPADM

## 2018-05-15 RX ORDER — MIDAZOLAM HYDROCHLORIDE 1 MG/ML
INJECTION INTRAMUSCULAR; INTRAVENOUS AS NEEDED
Status: DISCONTINUED | OUTPATIENT
Start: 2018-05-15 | End: 2018-05-15 | Stop reason: SURG

## 2018-05-15 RX ORDER — LIDOCAINE HYDROCHLORIDE AND EPINEPHRINE 10; 10 MG/ML; UG/ML
INJECTION, SOLUTION INFILTRATION; PERINEURAL AS NEEDED
Status: DISCONTINUED | OUTPATIENT
Start: 2018-05-15 | End: 2018-05-15 | Stop reason: HOSPADM

## 2018-05-15 RX ORDER — FENTANYL CITRATE/PF 50 MCG/ML
25 SYRINGE (ML) INJECTION
Status: DISCONTINUED | OUTPATIENT
Start: 2018-05-15 | End: 2018-05-15 | Stop reason: HOSPADM

## 2018-05-15 RX ORDER — SODIUM CHLORIDE, SODIUM LACTATE, POTASSIUM CHLORIDE, CALCIUM CHLORIDE 600; 310; 30; 20 MG/100ML; MG/100ML; MG/100ML; MG/100ML
125 INJECTION, SOLUTION INTRAVENOUS CONTINUOUS
Status: DISCONTINUED | OUTPATIENT
Start: 2018-05-15 | End: 2018-05-15 | Stop reason: HOSPADM

## 2018-05-15 RX ORDER — FENTANYL CITRATE 50 UG/ML
INJECTION, SOLUTION INTRAMUSCULAR; INTRAVENOUS AS NEEDED
Status: DISCONTINUED | OUTPATIENT
Start: 2018-05-15 | End: 2018-05-15 | Stop reason: SURG

## 2018-05-15 RX ADMIN — PROPOFOL 75 MCG/KG/MIN: 10 INJECTION, EMULSION INTRAVENOUS at 10:05

## 2018-05-15 RX ADMIN — FENTANYL CITRATE 25 MCG: 50 INJECTION, SOLUTION INTRAMUSCULAR; INTRAVENOUS at 10:20

## 2018-05-15 RX ADMIN — FENTANYL CITRATE 50 MCG: 50 INJECTION, SOLUTION INTRAMUSCULAR; INTRAVENOUS at 10:05

## 2018-05-15 RX ADMIN — MIDAZOLAM HYDROCHLORIDE 2 MG: 1 INJECTION, SOLUTION INTRAMUSCULAR; INTRAVENOUS at 10:05

## 2018-05-15 RX ADMIN — FENTANYL CITRATE 25 MCG: 50 INJECTION, SOLUTION INTRAMUSCULAR; INTRAVENOUS at 10:15

## 2018-05-15 RX ADMIN — SODIUM CHLORIDE: 0.9 INJECTION, SOLUTION INTRAVENOUS at 09:16

## 2018-05-15 NOTE — OP NOTE
Chest port placement 5/15/2018     History:      Pancreatic carcinoma     Procedure:     The patient was identified verbally and by wristband  Timeout was performed  Informed consent was obtained       All elements of maximal sterile barrier technique were followed (cap, mask, sterile gown, sterile gloves, large sterile sheet, hand hygiene and 2% chlorhexidine for cutaneous antisepsis)      Following obtaining informed consent, the patient was prepped and draped in the usual sterile fashion  Using ultrasound guidance, access was gained to the patient's right  internal jugular vein using a micropuncture system  The micropuncture wire was removed and a  035 wire was advanced to the level of the inferior vena cava using fluoroscopic guidance      Using 1% lidocaine, the region of the right anterior chest was was anesthetized  A small incision was made using a 15 blade scalpel  The port pocket was created using blunt dissection      The catheter tubing was tunneled from the incision to the venotomy  The micropuncture dilator was exchanged for a peel-away sheath, using fluoroscopic guidance  The catheter was place through the peel-away sheath  The catheter was measured and cut to size  The catheter was attached to the port  The port was flushed with saline to ensure patency without evidence of leakage  The port was placed in the port pocket  The port was sutured in the pocket using 3-0 Vicryl      The incisions were approximated with 3-0 Vicryl and Histoacryl  The patient tolerated the procedure well without apparent immediate complications  The patient left the IR department in unchanged condition      Dr Aretha Krabbe performed and directly supervised the entire procedure      Findings:      Using ultrasound guidance, the right internal jugular vein was cannulated using Seldinger technique  The right internal jugular vein was evaluated as a potential access site   The right internal jugular vein was patent, and free of thrombus  Static images of the vessel was obtained  Visualization of real time needle entry into the vessel was obtained      Fluoroscopic spot image demonstrates a newly placed single lumen chest port via the right internal jugular vein with the most central aspect at the SVC/RA junction   The catheter tubing is smooth in contour         IMPRESSION:     Successful placement of a single lumen chest port via the right internal jugular vein

## 2018-05-15 NOTE — ANESTHESIA PREPROCEDURE EVALUATION
Review of Systems/Medical History  Patient summary reviewed  Chart reviewed  History of anesthetic complications     Cardiovascular  Negative cardio ROS    Pulmonary  Smoker cigarette smoker  ,        GI/Hepatic    GERD , GI malignancy, Pancreatic problem,             Endo/Other  History of thyroid disease ,      GYN       Hematology  Negative hematology ROS      Musculoskeletal  Negative musculoskeletal ROS        Neurology  Negative neurology ROS      Psychology   Anxiety, Depression ,              Physical Exam    Airway    Mallampati score: II  TM Distance: >3 FB  Neck ROM: full     Dental   No notable dental hx     Cardiovascular  Comment: Negative ROS,     Pulmonary      Other Findings  Decreased oral opening  May be difficult intubation  Anesthesia Plan  ASA Score- 3     Anesthesia Type- IV sedation with anesthesia with ASA Monitors  Additional Monitors:   Airway Plan:     Comment: Pt prefers deep sedation  Pt had slow recovery from bx anesthetic  She blames the med they gave her to dry out secretions   Plan Factors-  Patient smoked on day of surgery  Induction- intravenous  Postoperative Plan-     Informed Consent- Anesthetic plan and risks discussed with patient  I personally reviewed this patient with the CRNA  Discussed and agreed on the Anesthesia Plan with the CRNA  Dyana Solis

## 2018-05-15 NOTE — PROGRESS NOTES
Patient arrived to [facility] for port placement    The procedure and risks were discussed with the patient  All questions were answered  Informed consent was obtained

## 2018-05-15 NOTE — ANESTHESIA POSTPROCEDURE EVALUATION
Post-Op Assessment Note      CV Status:  Stable    Mental Status:  Alert and awake    Hydration Status:  Euvolemic    PONV Controlled:  Controlled    Airway Patency:  Patent    Post Op Vitals Reviewed: Yes          Staff: CRNA           /53 (05/15/18 1043)    Temp      Pulse 83 (05/15/18 1043)   Resp 15 (05/15/18 1043)    SpO2 98 % (05/15/18 1043)

## 2018-05-15 NOTE — INTERVAL H&P NOTE
H & P reviewed after examining the patient and I find no changes in the patient condition since the H & P has been written  Patient re-evaluated   Accept as history and physical     Ty Lao, DO/May 15, 2018/9:48 AM

## 2018-05-15 NOTE — DISCHARGE INSTRUCTIONS
How to Care for Your Implanted Venous Access Port   WHAT YOU NEED TO KNOW:   An implanted venous access port is a device used to give treatments and take blood  It may also be called a central venous access device (CVAD)  The port is a small container that is placed under your skin, usually in your upper chest  A port can also be placed in your arm or abdomen  The port is attached to a catheter that enters a large vein  DISCHARGE INSTRUCTIONS:   Prevent an infection:   · Wash your hands often  Use soap and water  Clean your hands before and after you care for your port  Remind everyone who cares for your port to wash their hands  · Wear clean medical gloves when you care for your port  Do not touch or handle your port unless you need to care for it  · Clean the skin around your port every day  Ask your healthcare provider what to use to clean your skin  · Check your skin for infection every day  Look for redness, swelling, or fluid oozing from the port site  Medicines:   · Topical medicine  may be needed to numb your skin before your port is accessed with a needle  Ask your healthcare provider if and when you should use the medicine  · Take your medicine as directed  Contact your healthcare provider if you think your medicine is not helping or if you have side effects  Tell him if you are allergic to any medicine  Keep a list of the medicines, vitamins, and herbs you take  Include the amounts, and when and why you take them  Bring the list or the pill bottles to follow-up visits  Carry your medicine list with you in case of an emergency  How to access your port: Your healthcare provider will show you or a family member how to access your port with a needle  Never  try to use your port without proper training  To access your port:  · Gather your supplies  If you have a cough, wear a mask while you prepare and access your port  · You may need to attach tubing to the needle   The tubing has a clamp that must stay closed when not in use  You will attach a syringe that contains saline to the tubing  Open the clamp and slowly push saline through the tubing and needle  Then close the clamp to remove air from the tubing before you access your port  Leave the syringe attached to the tubing  · Clean your port site and the skin around it  Ask your healthcare provider what solution to use  Clean your skin for 90 seconds or as directed  Allow the  to dry completely  Do not blow on the site to dry the area  · You may need to apply topical medicine to numb the port area  Use the numbing medicine as directed  The more your port is used, the less it will hurt  · With one hand, feel for the edges of your port  Use this hand to stretch the skin across your port, to help hold the port in place  With your other hand, insert the needle through your skin and into the center of the port  Push the needle in until you hit the back wall of the port  Once the needle is in place, open the tubing clamp, and slowly pull back on the syringe  If blood flows back into the tubing and syringe, the needle is in the proper place  If you do not see blood, you will need to change the position of the needle  Close the clamp on the tubing  Ask your healthcare provider how to throw away the blood-filled syringe correctly  · You may need to cover the site with a gauze bandage while the needle is in place  This keeps air, dirt, and germs from entering the port  Ask your healthcare provider how often you should change the bandage if you leave the needle in place  When to flush your port:  Flush your port with saline (salt water) before, after, and between medicines and treatments  Flush your port with heparin (a blood thinner) between each port use  Your port also needs to be flushed with heparin every 4 weeks when it is not being used regularly   You will use a syringe to push a small amount of saline or heparin into the port and catheter  Flush your port to keep the catheter from getting blocked and to prevent medicines from mixing in the tubing  How to give medicines through your port: Your healthcare provider will show you or a family member how to give medicines or liquids through your port  Medicines and treatments enter your port through tubing attached to the needle  How to remove the needle from your port: Wash your hands and put on clean medical gloves  Flush your port as directed  Remove the needle  Ask how to dispose of your needles if you do not have a needle container  You may need to cover your port site for a short time after you remove the needle  Implanted venous access information card: Your healthcare provider will give you a card with information about your port type  Keep this information in a safe place that is easy to find  Activity:  When your port is not being used, you can do your usual daily activities  You will be able to bathe, shower, or swim  Follow up with your healthcare provider as directed:  Write down your questions so you remember to ask them during your visits  Contact your healthcare provider if:   · You have a fever  · You run out of supplies to care for your skin or port  · Your port site is red, swollen, or draining pus  · Your port site turns cold, changes color, or you cannot feel it  · The veins in your neck or chest bulge  · You have trouble using your port  · You have questions or concerns about your condition or care  Seek care immediately or call 911 if:   · Blood soaks through your bandage  · You hear a bubbling noise when your port is flushed  · The skin over or around your port breaks open  · Your heart is jumping or fluttering  · You have a headache, blurred vision, and feel confused  · You have pain in your arm, neck, shoulder, or chest     · You have trouble breathing that is getting worse over time    © 2017 [employer] [facility] is for End User's use only and may not be sold, redistributed or otherwise used for commercial purposes  All illustrations and images included in CareNotes® are the copyrighted property of A D A M , Inc  or Duran Saldaña  The above information is an  only  It is not intended as medical advice for individual conditions or treatments  Talk to your doctor, nurse or pharmacist before following any medical regimen to see if it is safe and effective for you

## 2018-05-15 NOTE — H&P (VIEW-ONLY)
Oncology Outpatient Consult Note  Caty Chaidez 65 y o  female MRN: @ Encounter: [id number]        Date:  2018        CC:  T2 N0 M0 pancreatic cancer status post resection      HPI:  Caty Chaidez is seen for initial consultation 2018 regarding A newly resected pancreatic cancer  She was seen here at [place] [id number] by our colleagues in surgical oncology  They were planning a resection of a pancreatic mass which was biopsy-proven to be adenocarcinoma  The patient had no family or support system here in the [facility] area so she decided to go to [place] where she has family to get her resection  She had her resection done and was found to have a T2 N0 M0 malignancy  Since she had no lymph nodes involved and was decided to give her 6 months of adjuvant chemotherapy with gemcitabine and Xeloda  From what she tells me she has been on Xeloda 1500 mg twice a day and gemcitabine thousand milligrams per meter square day 1 and 8 and 15  Xeloda is 2 out of 4 weeks  The gemcitabine was 3 out of 4 weeks  She got one cycle and then moved back home to [facility] so wishes to establish care  She states she has a lot of anxiety and depression issues  She lost her  a few months ago to cancer  Her father  a few years ago and she states she properly did not grieve  She is currently on Klonopin through the oncologist that was treating her in [place]  I will plug her in with our colleagues in palliative care as she had seen them in the past  She states there were having a lot of problems with her veins so we will have a port placed  She had a left ear infection within the last few weeks for which she was on antibiotics and still has some pain along with some jaw pain so I will have her see our colleagues in your nose and throat  She is going to contact her dentist to be evaluated for the left-sided jaw pain also  As far as  symptoms are concerned she states she feels depressed and anxious   She also has fogginess which she attributes to the chemotherapy  She promises she is not taking any pain medication while she drives but has taken pain medication in the past including oxycodone 5 mg  I clearly advised her she is not to drive and she is taking any of these medications  She promises she is not doing this  As far as symptoms are concerned she has some pain at the site of her surgery  Denies any nausea denies any vomiting  Denies any peeling of the hands and feet since she has received one cycle of her chemotherapy already  The rest of her 14 point review of systems today was negative  Test Results:    Imaging: No results found  Labs:   Lab Results   Component Value Date    WBC 11 46 (H) 01/05/2018    HGB 13 5 01/05/2018    HCT 40 7 01/05/2018     (H) 01/05/2018     01/05/2018     Lab Results   Component Value Date     (L) 01/05/2018    K 4 2 01/05/2018    CL 97 (L) 01/05/2018    CO2 31 01/05/2018    ANIONGAP 6 01/05/2018    BUN 12 01/05/2018    CREATININE 0 68 01/05/2018    GLUCOSE 96 01/05/2018    GLUF 102 (H) 11/16/2017    CALCIUM 9 0 01/05/2018    AST 25 01/05/2018    ALT 30 01/05/2018    ALKPHOS 79 01/05/2018    PROT 8 8 (H) 01/05/2018    BILITOT 0 60 01/05/2018    EGFR 95 01/05/2018         Lab Results   Component Value Date    KYVWYJIJ44 338 06/20/2016           ROS: As stated in history of present illness otherwise her 14 point review of systems today was negative          Active Problems:   Patient Active Problem List   Diagnosis    Malignant neoplasm of tail of pancreas ([facility] [address] )    Psychiatric disorder    Malignant cachexia ([facility] [address] [facility]    Anxiety about health    Depression    Unintended weight loss    Chronic low back pain with bilateral sciatica       Past Medical History:   Past Medical History:   Diagnosis Date    Anxiety     Chronic pain disorder     back    Disease of thyroid gland     GERD (gastroesophageal reflux disease)     Pancreatic mass     Psychiatric disorder anxiety       Surgical History:   Past Surgical History:   Procedure Laterality Date    APPENDECTOMY      BACK SURGERY      CHOLECYSTECTOMY      LEG SURGERY      right and left  osteo chondroma removed    LINEAR ENDOSCOPIC U/S N/A 1/16/2018    Procedure: LINEAR ENDOSCOPIC U/S;  Surgeon: Summer Rubio MD;  Location: [facility]; Service: Gastroenterology    TONSILLECTOMY         Family History:  No family history on file  Cancer-related family history is not on file  Social History:   Social History     Social History    Marital status: /Civil Union     Spouse name: N/A    Number of children: N/A    Years of education: N/A     Occupational History    Not on file       Social History Main Topics    Smoking status: Current Every Day Smoker    Smokeless tobacco: Never Used    Alcohol use No    Drug use: Yes     Types: Marijuana      Comment: 1 month    Sexual activity: Not on file     Other Topics Concern    Not on file     Social History Narrative    No narrative on file       Current Medications:   Current Outpatient Prescriptions   Medication Sig Dispense Refill    diazepam (VALIUM) 5 mg tablet Take 1 tablet (5 mg total) by mouth every 12 (twelve) hours as needed for anxiety 30 tablet 0    FLUoxetine (PROzac) 40 MG capsule Take 1 capsule (40 mg total) by mouth daily 30 capsule 1    levothyroxine 25 mcg tablet Take 25 mcg by mouth daily      oxyCODONE (ROXICODONE) 5 mg immediate release tablet Take 1 tablet (5 mg total) by mouth every 8 (eight) hours as needed for moderate pain Max Daily Amount: 15 mg 100 tablet 0    pantoprazole (PROTONIX) 40 mg tablet Take 40 mg by mouth daily      pancrelipase, Lip-Prot-Amyl, (CREON) 12,000 units capsule Take 12,000 units of lipase by mouth 3 (three) times a day with meals for 180 doses 180 capsule 0    senna-docusate sodium (SENOKOT-S) 8 6-50 mg per tablet Take 2 tablets by mouth daily for 30 days 7 tablet 0     Current Facility-Administered Medications   Medication Dose Route Frequency Provider Last Rate Last Dose    LORazepam (ATIVAN) tablet 0 5 mg  0 5 mg Oral Q8H PRN Lorraparviz Cranes, DO           Allergies: Allergies   Allergen Reactions    Codeine Other (See Comments)     Feels crazy when taking it         Physical Exam:    Body surface area is 1 47 meters squared  Wt Readings from Last 3 Encounters:   05/04/18 47 2 kg (104 lb)   01/25/18 47 1 kg (103 lb 14 4 oz)   01/16/18 45 8 kg (101 lb)        Temp Readings from Last 3 Encounters:   05/04/18 98 1 °F (36 7 °C) (Tympanic)   01/25/18 97 8 °F (36 6 °C) (Oral)   01/16/18 (!) 97 2 °F (36 2 °C) (Tympanic)        BP Readings from Last 3 Encounters:   05/04/18 118/60   01/25/18 120/72   01/16/18 146/66         Pulse Readings from Last 3 Encounters:   05/04/18 104   01/25/18 72   01/16/18 87       Physical Exam     Constitutional   General appearance: No acute distress, well appearing and well nourished  Eyes   Conjunctiva and lids: No swelling, erythema or discharge  Pupils and irises: Equal, round and reactive to light  Ears, Nose, Mouth, and Throat   External inspection of ears and nose: Normal     Nasal mucosa, septum, and turbinates: Normal without edema or erythema  Oropharynx: Normal with no erythema, edema, exudate or lesions  Pulmonary   Respiratory effort: No increased work of breathing or signs of respiratory distress  Auscultation of lungs: Clear to auscultation  Cardiovascular   Palpation of heart: Normal PMI, no thrills  Auscultation of heart: Normal rate and rhythm, normal S1 and S2, without murmurs  Examination of extremities for edema and/or varicosities: Normal     Carotid pulses: Normal     Abdomen   Abdomen: Non-tender, no masses  Liver and spleen: No hepatomegaly or splenomegaly  Lymphatic   Palpation of lymph nodes in neck: No lymphadenopathy      Musculoskeletal   Gait and station: Normal     Digits and nails: Normal without clubbing or cyanosis  Inspection/palpation of joints, bones, and muscles: Normal     Skin   Skin and subcutaneous tissue: Normal without rashes or lesions  Neurologic   Cranial nerves: Cranial nerves 2-12 intact  Sensation: No sensory loss  Psychiatric   Orientation to person, place, and time: Normal     Mood and affect: Normal           Assessment/ Plan:      The patient is a pleasant 46-year-old female with newly diagnosed localized pancreatic cancer status post resection  She got one cycle of adjuvant chemotherapy in [place] prior to transferring back to the [facility]  I will continue the regimen that she was started on over there  She was apparently getting gemcitabine thousand centimeter squared day 1 and 8 and 15  She is getting xeloda 1500 mg twice a day 2 out of 4 weeks  This comes to approximately 1000 g/m²  The regimen on [facility] and in the [facility] presentation was actually 830 mg meter square twice a day  That regimen was over 3 weeks  For now I will continue her on her current regimen as this has already been started and she tolerated it well in [place]  If she has any issues or treatment delays we will obviously switch her regimen to the original regimen per the study which gives a slightly lower dose but over 3 weeks  Regardless of regimen she will get Neulasta growth factor support through this  We will plan for 5 more cycles of treatment  Since she had no lymph nodes there was no plan to give her radiation according to her and this sounds reasonable  She will sign a consent today  She is already getting her xeloda delivered but I advised her to not start taking it until she starts gemcitabine here in [facility]  Goals and Barriers:  Current Goal:  Prolong Survival from Pancreatic Cancer  Barriers: None  Patient's Capacity to Self Care:  Patient  able to self care      Portions of the record may have been created with voice recognition software   Occasional wrong word or "sound a like" substitutions may have occurred due to the inherent limitations of voice recognition software   Read the chart carefully and recognize, using context, where substitutions have occurred

## 2018-05-17 ENCOUNTER — TELEPHONE (OUTPATIENT)
Dept: HEMATOLOGY ONCOLOGY | Facility: CLINIC | Age: 63
End: 2018-05-17

## 2018-05-17 RX ORDER — ACETAMINOPHEN 325 MG/1
650 TABLET ORAL ONCE
Status: COMPLETED | OUTPATIENT
Start: 2018-05-18 | End: 2018-05-18

## 2018-05-17 RX ORDER — SODIUM CHLORIDE 9 MG/ML
20 INJECTION, SOLUTION INTRAVENOUS CONTINUOUS
Status: DISCONTINUED | OUTPATIENT
Start: 2018-05-18 | End: 2018-05-21 | Stop reason: HOSPADM

## 2018-05-17 NOTE — TELEPHONE ENCOUNTER
Shayne Prader from Dr Kitty Lou office called for lab results prior to dental extraction  Verbally reviewed then faxed to their office

## 2018-05-18 ENCOUNTER — DOCUMENTATION (OUTPATIENT)
Dept: NUTRITION | Facility: HOSPITAL | Age: 63
End: 2018-05-18

## 2018-05-18 ENCOUNTER — HOSPITAL ENCOUNTER (OUTPATIENT)
Dept: INFUSION CENTER | Facility: HOSPITAL | Age: 63
Discharge: HOME | End: 2018-05-18
Payer: COMMERCIAL

## 2018-05-18 VITALS
WEIGHT: 103.17 LBS | HEIGHT: 62 IN | SYSTOLIC BLOOD PRESSURE: 122 MMHG | BODY MASS INDEX: 18.99 KG/M2 | TEMPERATURE: 98.4 F | OXYGEN SATURATION: 99 % | DIASTOLIC BLOOD PRESSURE: 64 MMHG | HEART RATE: 90 BPM

## 2018-05-18 PROCEDURE — 96367 TX/PROPH/DG ADDL SEQ IV INF: CPT

## 2018-05-18 PROCEDURE — 96413 CHEMO IV INFUSION 1 HR: CPT

## 2018-05-18 RX ADMIN — Medication 300 UNITS: at 10:45

## 2018-05-18 RX ADMIN — DEXAMETHASONE SODIUM PHOSPHATE: 10 INJECTION, SOLUTION INTRAMUSCULAR; INTRAVENOUS at 09:41

## 2018-05-18 RX ADMIN — GEMCITABINE 1400 MG: 38 INJECTION, SOLUTION INTRAVENOUS at 10:06

## 2018-05-18 RX ADMIN — ACETAMINOPHEN 650 MG: 325 TABLET, FILM COATED ORAL at 09:46

## 2018-05-18 RX ADMIN — SODIUM CHLORIDE 20 ML/HR: 0.9 INJECTION, SOLUTION INTRAVENOUS at 09:36

## 2018-05-18 NOTE — PROGRESS NOTES
Premeds tolerated well  Gemzar now infusing  Pt appears much more relaxed, verbalizes "feeling more comfortable"  Napping intermittently  Will continue to monitor closely

## 2018-05-18 NOTE — PROGRESS NOTES
Gemzar tolerated well  Port deaccessed per protocol  Pt encouraged to take her Xeloda at home as directed by Dr Ramesh Grant office  Pt verbalized understanding of home medication regimen  Pt then d/c'd to home with steady gait

## 2018-05-18 NOTE — PROGRESS NOTES
Spoke with pt for some time as RN scores MST screening as 2  After speaking with pt, pt has actually gained 6# x 1 5 weeks, states her appetite flucuates but she eats even when she is not hungry as she knows she needs to  Pt currently at 104# was 98# x 1 5 weeks ago, wants to weigh 110#  Pt states she picks through the day doesn't really have set meals  Pt does not have any questions or concerns at this time  Pt with no acute nutritional issues

## 2018-05-18 NOTE — SOCIAL WORK
Pt received chemo today for first time in this clinic  She completed Distress Thermometer and scored 8/10  Pt informed that MSW would be in clinic shortly after treatment but she did not want to wait  Phone contact attempted and voice mail left  No return call as of end of day  Will re-attempt contact on 5/21

## 2018-05-18 NOTE — PROGRESS NOTES
Rec'd pt for first treatment with us and first port access since placement  Pt verbalizes a high level of stress and anxiety  Much time was spent making her comfortable and relaxed as she was oriented to her surroundings  VSS, Port accessed per protocol, premeds infusing

## 2018-05-20 ENCOUNTER — HOSPITAL ENCOUNTER (EMERGENCY)
Facility: HOSPITAL | Age: 63
Discharge: HOME | End: 2018-05-20
Attending: EMERGENCY MEDICINE | Admitting: EMERGENCY MEDICINE
Payer: COMMERCIAL

## 2018-05-20 ENCOUNTER — APPOINTMENT (EMERGENCY)
Dept: CT IMAGING | Facility: HOSPITAL | Age: 63
End: 2018-05-20
Payer: COMMERCIAL

## 2018-05-20 ENCOUNTER — APPOINTMENT (EMERGENCY)
Dept: RADIOLOGY | Facility: HOSPITAL | Age: 63
End: 2018-05-20
Payer: COMMERCIAL

## 2018-05-20 VITALS
OXYGEN SATURATION: 96 % | RESPIRATION RATE: 18 BRPM | BODY MASS INDEX: 17.75 KG/M2 | HEART RATE: 85 BPM | DIASTOLIC BLOOD PRESSURE: 59 MMHG | HEIGHT: 64 IN | WEIGHT: 104 LBS | SYSTOLIC BLOOD PRESSURE: 126 MMHG | TEMPERATURE: 100.3 F

## 2018-05-20 DIAGNOSIS — L03.90 CELLULITIS: Primary | ICD-10-CM

## 2018-05-20 LAB
ALBUMIN SERPL BCP-MCNC: 2.9 G/DL (ref 3.5–5)
ALP SERPL-CCNC: 78 U/L (ref 46–116)
ALT SERPL W P-5'-P-CCNC: 25 U/L (ref 12–78)
ANION GAP SERPL CALCULATED.3IONS-SCNC: 6 MMOL/L (ref 4–13)
ANION GAP SERPL CALCULATED.3IONS-SCNC: 7 MMOL/L (ref 4–13)
APTT PPP: 32 SECONDS (ref 24–36)
AST SERPL W P-5'-P-CCNC: 56 U/L (ref 5–45)
BASOPHILS # BLD AUTO: 0.03 THOUSANDS/ΜL (ref 0–0.1)
BASOPHILS NFR BLD AUTO: 0 % (ref 0–1)
BILIRUB SERPL-MCNC: 0.8 MG/DL (ref 0.2–1)
BILIRUB UR QL STRIP: NEGATIVE
BUN SERPL-MCNC: 15 MG/DL (ref 5–25)
BUN SERPL-MCNC: 17 MG/DL (ref 5–25)
CALCIUM SERPL-MCNC: 8.6 MG/DL (ref 8.3–10.1)
CALCIUM SERPL-MCNC: 8.7 MG/DL (ref 8.3–10.1)
CHLORIDE SERPL-SCNC: 104 MMOL/L (ref 100–108)
CHLORIDE SERPL-SCNC: 98 MMOL/L (ref 100–108)
CLARITY UR: CLEAR
CO2 SERPL-SCNC: 28 MMOL/L (ref 21–32)
CO2 SERPL-SCNC: 29 MMOL/L (ref 21–32)
COLOR UR: YELLOW
CREAT SERPL-MCNC: 0.47 MG/DL (ref 0.6–1.3)
CREAT SERPL-MCNC: 0.6 MG/DL (ref 0.6–1.3)
EOSINOPHIL # BLD AUTO: 0.02 THOUSAND/ΜL (ref 0–0.61)
EOSINOPHIL NFR BLD AUTO: 0 % (ref 0–6)
ERYTHROCYTE [DISTWIDTH] IN BLOOD BY AUTOMATED COUNT: 17.5 % (ref 11.6–15.1)
GFR SERPL CREATININE-BSD FRML MDRD: 107 ML/MIN/1.73SQ M
GFR SERPL CREATININE-BSD FRML MDRD: 99 ML/MIN/1.73SQ M
GLUCOSE SERPL-MCNC: 105 MG/DL (ref 65–140)
GLUCOSE SERPL-MCNC: 98 MG/DL (ref 65–140)
GLUCOSE UR STRIP-MCNC: NEGATIVE MG/DL
HCT VFR BLD AUTO: 31.4 % (ref 34.8–46.1)
HGB BLD-MCNC: 10.4 G/DL (ref 11.5–15.4)
HGB UR QL STRIP.AUTO: NEGATIVE
INR PPP: 1.11 (ref 0.86–1.17)
KETONES UR STRIP-MCNC: NEGATIVE MG/DL
LACTATE SERPL-SCNC: 0.9 MMOL/L (ref 0.5–2)
LEUKOCYTE ESTERASE UR QL STRIP: NEGATIVE
LYMPHOCYTES # BLD AUTO: 1.09 THOUSANDS/ΜL (ref 0.6–4.47)
LYMPHOCYTES NFR BLD AUTO: 9 % (ref 14–44)
MCH RBC QN AUTO: 35 PG (ref 26.8–34.3)
MCHC RBC AUTO-ENTMCNC: 33.1 G/DL (ref 31.4–37.4)
MCV RBC AUTO: 106 FL (ref 82–98)
MONOCYTES # BLD AUTO: 0.9 THOUSAND/ΜL (ref 0.17–1.22)
MONOCYTES NFR BLD AUTO: 7 % (ref 4–12)
NEUTROPHILS # BLD AUTO: 10.72 THOUSANDS/ΜL (ref 1.85–7.62)
NEUTS SEG NFR BLD AUTO: 84 % (ref 43–75)
NITRITE UR QL STRIP: NEGATIVE
PH UR STRIP.AUTO: 6.5 [PH] (ref 4.5–8)
PLATELET # BLD AUTO: 550 THOUSANDS/UL (ref 149–390)
PMV BLD AUTO: 9.5 FL (ref 8.9–12.7)
POTASSIUM SERPL-SCNC: 3.9 MMOL/L (ref 3.5–5.3)
POTASSIUM SERPL-SCNC: 6 MMOL/L (ref 3.5–5.3)
PROT SERPL-MCNC: 7.5 G/DL (ref 6.4–8.2)
PROT UR STRIP-MCNC: NEGATIVE MG/DL
PROTHROMBIN TIME: 13.7 SECONDS (ref 11.8–14.2)
RBC # BLD AUTO: 2.97 MILLION/UL (ref 3.81–5.12)
SODIUM SERPL-SCNC: 132 MMOL/L (ref 136–145)
SODIUM SERPL-SCNC: 140 MMOL/L (ref 136–145)
SP GR UR STRIP.AUTO: <=1.005 (ref 1–1.03)
UROBILINOGEN UR QL STRIP.AUTO: 0.2 E.U./DL
WBC # BLD AUTO: 12.76 THOUSAND/UL (ref 4.31–10.16)

## 2018-05-20 PROCEDURE — 96361 HYDRATE IV INFUSION ADD-ON: CPT

## 2018-05-20 PROCEDURE — 85610 PROTHROMBIN TIME: CPT | Performed by: EMERGENCY MEDICINE

## 2018-05-20 PROCEDURE — 85025 COMPLETE CBC W/AUTO DIFF WBC: CPT | Performed by: EMERGENCY MEDICINE

## 2018-05-20 PROCEDURE — 96374 THER/PROPH/DIAG INJ IV PUSH: CPT

## 2018-05-20 PROCEDURE — 99284 EMERGENCY DEPT VISIT MOD MDM: CPT

## 2018-05-20 PROCEDURE — 87040 BLOOD CULTURE FOR BACTERIA: CPT | Performed by: EMERGENCY MEDICINE

## 2018-05-20 PROCEDURE — 80048 BASIC METABOLIC PNL TOTAL CA: CPT | Performed by: EMERGENCY MEDICINE

## 2018-05-20 PROCEDURE — 36415 COLL VENOUS BLD VENIPUNCTURE: CPT | Performed by: EMERGENCY MEDICINE

## 2018-05-20 PROCEDURE — 74176 CT ABD & PELVIS W/O CONTRAST: CPT

## 2018-05-20 PROCEDURE — 80053 COMPREHEN METABOLIC PANEL: CPT | Performed by: EMERGENCY MEDICINE

## 2018-05-20 PROCEDURE — 85730 THROMBOPLASTIN TIME PARTIAL: CPT | Performed by: EMERGENCY MEDICINE

## 2018-05-20 PROCEDURE — 83605 ASSAY OF LACTIC ACID: CPT | Performed by: EMERGENCY MEDICINE

## 2018-05-20 PROCEDURE — 71046 X-RAY EXAM CHEST 2 VIEWS: CPT

## 2018-05-20 PROCEDURE — 81003 URINALYSIS AUTO W/O SCOPE: CPT | Performed by: EMERGENCY MEDICINE

## 2018-05-20 PROCEDURE — 93005 ELECTROCARDIOGRAM TRACING: CPT

## 2018-05-20 RX ORDER — CEPHALEXIN 250 MG/1
500 CAPSULE ORAL ONCE
Status: COMPLETED | OUTPATIENT
Start: 2018-05-20 | End: 2018-05-20

## 2018-05-20 RX ORDER — FENTANYL CITRATE 50 UG/ML
25 INJECTION, SOLUTION INTRAMUSCULAR; INTRAVENOUS ONCE
Status: COMPLETED | OUTPATIENT
Start: 2018-05-20 | End: 2018-05-20

## 2018-05-20 RX ORDER — CEPHALEXIN 500 MG/1
500 CAPSULE ORAL 4 TIMES DAILY
Qty: 28 CAPSULE | Refills: 0 | Status: SHIPPED | OUTPATIENT
Start: 2018-05-20 | End: 2018-05-27

## 2018-05-20 RX ADMIN — FENTANYL CITRATE 25 MCG: 50 INJECTION, SOLUTION INTRAMUSCULAR; INTRAVENOUS at 12:19

## 2018-05-20 RX ADMIN — CEPHALEXIN 500 MG: 250 CAPSULE ORAL at 15:23

## 2018-05-20 RX ADMIN — SODIUM CHLORIDE 1000 ML: 0.9 INJECTION, SOLUTION INTRAVENOUS at 11:58

## 2018-05-20 NOTE — ED NOTES
Patient provided blanket, call bell within reach, bed low position,       Teodoro Cast, RN  05/20/18 9350

## 2018-05-20 NOTE — ED PROVIDER NOTES
History  Chief Complaint   Patient presents with    Vascular Access Problem     Pt presents to ED with redness and warmth to port a cath side, port was placed last tuesday and she received chemo on friday through port, yesterday port felt itchy and burned      This is a 29-year-old female with a history of pancreatic cancer approximately 5 months past diagnosis status post surgery currently undergoing chemotherapy who presents with pain itchiness and discomfort in the right subclavian port area which was placed last week she had chemotherapy done 3 days ago  Denies any cough no nausea vomiting diarrhea or urinary symptoms  She does have some generalized mild abdominal pain  Her port area is noted to be erythematous and she has a low grade temperature in the emergency room with generalized achiness and fatigue  History provided by:  Patient  Medical Problem   Location:   right subclavian port  Quality:   pain and erythema  Severity:  Moderate  Onset quality:  Gradual  Duration:  1 day  Timing:  Constant  Progression:  Worsening  Chronicity:  New  Context:   patient with pancreatic cancer currently undergoing chemotherapy and had right subclavian port placed last week  Associated symptoms: abdominal pain, fatigue, fever and myalgias        Prior to Admission Medications   Prescriptions Last Dose Informant Patient Reported? Taking?    FLUoxetine (PROzac) 40 MG capsule   No No   Sig: Take 1 capsule (40 mg total) by mouth daily   amoxicillin (AMOXIL) 875 mg tablet   Yes No   Sig: Take 875 mg by mouth 2 (two) times a day   capecitabine (XELODA) 500 MG tablet   No No   Sig: Take 3 tablets (1,500 mg total) by mouth 2 (two) times a day   diazepam (VALIUM) 5 mg tablet   No No   Sig: Take 1 tablet (5 mg total) by mouth every 12 (twelve) hours as needed for anxiety   divalproex sodium (DEPAKOTE) 125 mg EC tablet   No No   Si po bid   levothyroxine 25 mcg tablet   Yes No   Sig: Take 25 mcg by mouth daily   oxyCODONE (ROXICODONE) 5 mg immediate release tablet   No No   Sig: Take 1 tablet (5 mg total) by mouth every 8 (eight) hours as needed for moderate pain Max Daily Amount: 15 mg   pancrelipase, Lip-Prot-Amyl, (CREON) 12,000 units capsule   No No   Sig: Take 12,000 units of lipase by mouth 3 (three) times a day with meals for 180 doses   pantoprazole (PROTONIX) 40 mg tablet   Yes No   Sig: Take 40 mg by mouth daily   senna-docusate sodium (SENOKOT-S) 8 6-50 mg per tablet   No No   Sig: Take 2 tablets by mouth daily for 30 days      Facility-Administered Medications: None       Past Medical History:   Diagnosis Date    Anxiety     Cancer ([facility] [address] )     Chronic pain disorder     back    Disease of thyroid gland     GERD (gastroesophageal reflux disease)     Pancreatic mass     Psychiatric disorder     anxiety       Past Surgical History:   Procedure Laterality Date    APPENDECTOMY      BACK SURGERY      CHOLECYSTECTOMY      LEG SURGERY      right and left  osteo chondroma removed    LINEAR ENDOSCOPIC U/S N/A 1/16/2018    Procedure: LINEAR ENDOSCOPIC U/S;  Surgeon: Terence Edwards MD;  [facility]: [facility]; Service: Gastroenterology    OH INSJ TUNNELED CTR VAD W/SUBQ PORT AGE 5 YR/> N/A 5/15/2018    Procedure: INSERTION VENOUS PORT ( PORT-A-CATH) IR;  Surgeon: Skinny Figueroa DO;  [facility]: [facility];  Service: Interventional Radiology    TONSILLECTOMY      WHIPPLE PROCEDURE W/ LAPAROSCOPY         Family History   Problem Relation Age of Onset    Cancer Father      I have reviewed and agree with the history as documented  Social History   Substance Use Topics    Smoking status: Current Every Day Smoker    Smokeless tobacco: Never Used    Alcohol use No        Review of Systems   Constitutional: Positive for fatigue and fever  Gastrointestinal: Positive for abdominal pain  Musculoskeletal: Positive for myalgias     Skin:        Right port erythema and tenderness   All other systems reviewed and are negative  Physical Exam  Physical Exam   Constitutional: She is oriented to person, place, and time  She appears well-developed  No distress  HENT:   Head: Normocephalic and atraumatic  Right Ear: External ear normal    Left Ear: External ear normal    Nose: Nose normal    Mouth/Throat: Oropharynx is clear and moist    Eyes: EOM are normal  Pupils are equal, round, and reactive to light  Neck: Neck supple  No tracheal deviation present  Cardiovascular: Normal rate, regular rhythm and intact distal pulses  Exam reveals no gallop and no friction rub  No murmur heard  Pulmonary/Chest: Effort normal and breath sounds normal  No stridor  No respiratory distress  She has no wheezes  She has no rales  Abdominal: Soft  Bowel sounds are normal  She exhibits no distension  There is tenderness ( generalized)  There is no rebound and no guarding  Musculoskeletal: Normal range of motion  She exhibits no edema, tenderness or deformity  Neurological: She is alert and oriented to person, place, and time  No cranial nerve deficit  Skin: She is not diaphoretic  Right subclavian port erythema and tenderness   Psychiatric: She has a normal mood and affect   Her behavior is normal  Thought content normal        Vital Signs  ED Triage Vitals [05/20/18 1125]   Temperature Pulse Respirations Blood Pressure SpO2   100 3 °F (37 9 °C) 93 18 127/58 98 %      Temp Source Heart Rate Source Patient Position - Orthostatic VS BP Location FiO2 (%)   Tympanic Monitor Lying Right arm --      Pain Score       8           Vitals:    05/20/18 1215 05/20/18 1235 05/20/18 1318 05/20/18 1455   BP: 120/58 (!) 171/86 118/56 126/59   Pulse: 81 91 83 85   Patient Position - Orthostatic VS:           Visual Acuity      ED Medications  Medications   cephalexin (KEFLEX) capsule 500 mg (not administered)   sodium chloride 0 9 % bolus 1,000 mL (0 mL Intravenous Stopped 5/20/18 1330)   fentanyl citrate (PF) 100 MCG/2ML 25 mcg (25 mcg Intravenous Given 5/20/18 1219)       Diagnostic Studies  Results Reviewed     Procedure Component Value Units Date/Time    Lactic acid, plasma [[id number]]  (Normal) Collected:  05/20/18 1341    Lab Status:  Final result Specimen:  Blood from Arm, Right Updated:  05/20/18 1425     LACTIC ACID 0 9 mmol/L     Narrative:         Result may be elevated if tourniquet was used during collection  Basic metabolic panel [[id number]] Collected:  05/20/18 1341    Lab Status:  Final result Specimen:  Blood from Arm, Right Updated:  05/20/18 1412     Sodium 140 mmol/L      Potassium 3 9 mmol/L      Chloride 104 mmol/L      CO2 29 mmol/L      Anion Gap 7 mmol/L      BUN 15 mg/dL      Creatinine 0 60 mg/dL      Glucose 98 mg/dL      Calcium 8 6 mg/dL      eGFR 99 ml/min/1 73sq m     Narrative:         National Kidney Disease Education Program recommendations are as follows:  GFR calculation is accurate only with a steady state creatinine  Chronic Kidney disease less than 60 ml/min/1 73 sq  meters  Kidney failure less than 15 ml/min/1 73 sq  meters      Protime-INR [[id number]]  (Normal) Collected:  05/20/18 1341    Lab Status:  Final result Specimen:  Blood from Arm, Right Updated:  05/20/18 1402     Protime 13 7 seconds      INR 1 11    APTT [[id number]]  (Normal) Collected:  05/20/18 1341    Lab Status:  Final result Specimen:  Blood from Arm, Right Updated:  05/20/18 1402     PTT 32 seconds     UA w Reflex to Microscopic w Reflex to Culture [[id number]] Collected:  05/20/18 1233    Lab Status:  Final result Specimen:  Urine from Urine, Clean Catch Updated:  05/20/18 1307     Color, UA Yellow     Clarity, UA Clear     Specific Gravity, UA <=1 005     pH, UA 6 5     Leukocytes, UA Negative     Nitrite, UA Negative     Protein, UA Negative mg/dl      Glucose, UA Negative mg/dl      Ketones, UA Negative mg/dl      Urobilinogen, UA 0 2 E U /dl      Bilirubin, UA Negative     Blood, UA Negative    Comprehensive metabolic panel [[id number]] (Abnormal) Collected:  05/20/18 1157    Lab Status:  Final result Specimen:  Blood from Hand, Right Updated:  05/20/18 1240     Sodium 132 (L) mmol/L      Potassium 6 0 (H) mmol/L      Chloride 98 (L) mmol/L      CO2 28 mmol/L      Anion Gap 6 mmol/L      BUN 17 mg/dL      Creatinine 0 47 (L) mg/dL      Glucose 105 mg/dL      Calcium 8 7 mg/dL      AST 56 (H) U/L      ALT 25 U/L      Alkaline Phosphatase 78 U/L      Total Protein 7 5 g/dL      Albumin 2 9 (L) g/dL      Total Bilirubin 0 80 mg/dL      eGFR 107 ml/min/1 73sq m     Narrative:         National Kidney Disease Education Program recommendations are as follows:  GFR calculation is accurate only with a steady state creatinine  Chronic Kidney disease less than 60 ml/min/1 73 sq  meters  Kidney failure less than 15 ml/min/1 73 sq  meters  CBC and differential [[id number]]  (Abnormal) Collected:  05/20/18 1157    Lab Status:  Final result Specimen:  Blood from Hand, Right Updated:  05/20/18 1211     WBC 12 76 (H) Thousand/uL      RBC 2 97 (L) Million/uL      Hemoglobin 10 4 (L) g/dL      Hematocrit 31 4 (L) %       (H) fL      MCH 35 0 (H) pg      MCHC 33 1 g/dL      RDW 17 5 (H) %      MPV 9 5 fL      Platelets 219 (H) Thousands/uL      Neutrophils Relative 84 (H) %      Lymphocytes Relative 9 (L) %      Monocytes Relative 7 %      Eosinophils Relative 0 %      Basophils Relative 0 %      Neutrophils Absolute 10 72 (H) Thousands/µL      Lymphocytes Absolute 1 09 Thousands/µL      Monocytes Absolute 0 90 Thousand/µL      Eosinophils Absolute 0 02 Thousand/µL      Basophils Absolute 0 03 Thousands/µL     Blood culture #1 [[id number]] Collected:  05/20/18 1135    Lab Status: In process Specimen:  Blood from Arm, Left Updated:  05/20/18 1208    Blood culture #2 [[id number]] Collected:  05/20/18 1157    Lab Status:   In process Specimen:  Blood from Hand, Right Updated:  05/20/18 1208                 CT abdomen pelvis wo contrast   Final Result by Erick Davison, MD (05/20 1440)      1  Limited study without intravenous or oral contrast   No definite acute abnormality identified  2   Stable interstitial lung disease  Workstation performed: [id number]         XR chest 2 views   ED Interpretation by Carito Kirby DO (05/20 4812)   No acute infiltrate or cardiomegaly                 Procedures  ECG 12 Lead Documentation  Date/Time: 5/20/2018 11:52 AM  Performed by: Katie Sosa  Authorized by: Katie Sosa     ECG reviewed by me, the ED Provider: yes    Patient location:  ED  Rhythm:     Rhythm: sinus rhythm    Ectopy:     Ectopy: none    T waves:     T waves: normal             Phone Contacts  ED Phone Contact    ED Course  ED Course as of May 20 1522   Sun May 20, 2018   1312  Patient is a very hard stick and her blood specimen was  hemolyzed most likely accounting for the hyperkalemia    1324  Unable to obtain lactic acid levels  Patient does not really want to be admitted to the hospital  Will check a CT scan of the abdomen without contrast to rule out any obvious intra-abdominal pathology and then speak with Hematology Oncology for further direction  1519  Discussed case with Dr Yadiel Castillo  At this time will treat patient with oral Keflex and follow up in the office within the next few days at this time he does not feel that patient needs inpatient care                          Initial Sepsis Screening     [address] Name 05/20/18 1327                Is the patient's history suggestive of a new or worsening infection? (!)  Yes (Proceed)  -DIOGENES        Suspected source of infection soft tissue  -DIOGENES        Are two or more of the following signs & symptoms of infection both present and new to the patient?  No  -DIOGENES        Indicate SIRS criteria Leukocytosis (WBC > 55583 IJL)  -DIOGENES        If the answer is yes to both questions, suspicion of sepsis is present          If severe sepsis is present AND tissue hypoperfusion perists in the hour after fluid resuscitation or lactate > 4, the patient meets criteria for SEPTIC SHOCK          Are any of the following organ dysfunction criteria present within 6 hours of suspected infection and SIRS criteria that are NOT considered to be chronic conditions?         Organ dysfunction          Date of presentation of severe sepsis          Time of presentation of severe sepsis          Tissue hypoperfusion persists in the hour after crystalloid fluid administration, evidenced, by either:          Was hypotension present within one hour of the conclusion of crystalloid fluid administration?         Date of presentation of septic shock          Time of presentation of septic shock            User Key  (r) = Recorded By, (t) = Taken By, (c) = Cosigned By    [address] Name Provider Type    [address], DO Physician                  MDM  Number of Diagnoses or Management Options  Diagnosis management comments:  Fever fatigue and achiness differential includes infected port/sepsis pneumonia or urinary tract infection will check x-rays labs and urinalysis for further evaluation       Amount and/or Complexity of Data Reviewed  Clinical lab tests: ordered  Tests in the radiology section of CPT®: ordered      CritCare Time    Disposition  Final diagnoses:   Cellulitis     Time reflects when diagnosis was documented in both MDM as applicable and the Disposition within this note     Time User Action Codes Description Comment    5/20/2018  3:20 PM Manpreet Patel Add [L03 90] Cellulitis       ED Disposition     ED Disposition Condition Comment    Discharge  Maria Alejandra Osborne discharge to home/self care      Condition at discharge: Stable        Follow-up Information     Follow up With Specialties Details Why Contact Info    Martha Parra MD Internal Medicine In 3 days  recheck JOE Vazquez [address] [facility]      Kathrin Payton MD Oncology, Hematology and Oncology, Hematology In 3 days  recheck [phone] [facility]  3rd floor  [address] [facility] [address]            Patient's Medications   Discharge Prescriptions    CEPHALEXIN (KEFLEX) 500 MG CAPSULE    Take 1 capsule (500 mg total) by mouth 4 (four) times a day for 7 days       Start Date: 5/20/2018 End Date: 5/27/2018       Order Dose: 500 mg       Quantity: 28 capsule    Refills: 0     No discharge procedures on file      ED Provider  Electronically Signed by           Cecilio Tse DO  05/20/18 4130

## 2018-05-20 NOTE — ED NOTES
CLAUDY Dey [facility]   Attempting IV/labs     Veronica Castaneda RN  05/20/18 4167

## 2018-05-20 NOTE — ED NOTES
[facility]  [facility] aware  SCOTT Dey to atttempt IV/labs       Zachary Tim RN  05/20/18 5054

## 2018-05-20 NOTE — ED NOTES
Call bell within reach, bed low position     Valentino Drape, [facility]  05/20/18 1373

## 2018-05-20 NOTE — DISCHARGE INSTRUCTIONS
Cellulitis, Ambulatory Care   GENERAL INFORMATION:   Cellulitis  is a skin infection caused by bacteria  Common symptoms include the following:   · Fever    · A red, warm, swollen area on your skin    · Pain when the area is touched    · Bumps or blisters (abscess) that may drain pus    · Bumpy, raised skin that feels like an orange peel  Seek immediate care for the following symptoms:   · An increase in pain, redness, warmth, and size    · Red streaks coming from the infected area    · A thin, gray-brown discharge coming from your infected skin area    · A crackling under your skin when you touch it    · Purple dots or bumps on your skin, or bleeding under your skin    · New swelling and pain in your legs    · Sudden trouble breathing or chest pain  Treatment for cellulitis  may include medicines to treat the bacterial infection or decrease pain  The infection may need to be cleaned out  Damaged, dead, or infected tissue may need to be cut away to help your wound heal   Manage your symptoms:   · Elevate your wound above the level of your heart  as often as you can  This will help decrease swelling and pain  Prop your wound on pillows or blankets to keep it elevated comfortably  · Clean your wound as directed  You may need to wash the wound with soap and water  Look for signs of infection  · Wear pressure stockings as directed  The stockings are tight and put pressure on your legs  This improves blood flow and decreases swelling  Prevent cellulitis:   · Wash your hands often  Use soap and water  Wash your hands after you use the bathroom, change a child's diapers, or sneeze  Wash your hands before you prepare or eat food  Use lotion to prevent dry, cracked skin  · Do not share personal items, such as towels, clothing, and razors  · Clean exercise equipment  with germ-killing  before and after you use it    Follow up with your healthcare provider as directed:  Write down your questions so you remember to ask them during your visits  CARE AGREEMENT:   You have the right to help plan your care  Learn about your health condition and how it may be treated  Discuss treatment options with your caregivers to decide what care you want to receive  You always have the right to refuse treatment  The above information is an  only  It is not intended as medical advice for individual conditions or treatments  Talk to your doctor, nurse or pharmacist before following any medical regimen to see if it is safe and effective for you  © 2014 [address] is for End User's use only and may not be sold, redistributed or otherwise used for commercial purposes  All illustrations and images included in CareNotes® are the copyrighted property of [employer]  or Duran Saldaña

## 2018-05-20 NOTE — ED NOTES
Returned from testing, re-connected to cardiac monitor, call bell within reach, bed low position, IVF infusing well       Jhonny Ochoa, RN  05/20/18 9623

## 2018-05-20 NOTE — ED NOTES
Patient verbalizes understanding of no driving  Reports that her neighbor coming to pick her up  Patient wanted to go sit outside and wait    D/c'd to waiting area     Marty Sr RN  05/20/18 9913

## 2018-05-20 NOTE — ED NOTES
CLAUDY Bush   Reports notifying Dr Osmel Quintana that patient's labs are hemolyzed but lab is running specimens     Gildardo Mays RN  05/20/18 1257

## 2018-05-20 NOTE — ED NOTES
Per CLAUDY Hopkins, she was unable to obtain PT INR and lactic acid   Dr Trinity Tinajero aware     Leon Schmidt RN  05/20/18 6639

## 2018-05-20 NOTE — ED NOTES
Patient aware she is going home and needs to call for a ride home     Bruce Yates [facility]  05/20/18 5534

## 2018-05-20 NOTE — ED NOTES
Attempted IV access and labs x 2 sticks on left arm without success  Obtained 1 set blood culture left arm  Blood flowed slowly   Patient reports being difficult stick     Shabbir George RN  05/20/18 Raghav Nelson RN  05/20/18 Raghav Nelson RN  05/20/18 5979

## 2018-05-21 ENCOUNTER — OFFICE VISIT (OUTPATIENT)
Dept: HEMATOLOGY ONCOLOGY | Facility: HOSPITAL | Age: 63
End: 2018-05-21
Payer: COMMERCIAL

## 2018-05-21 ENCOUNTER — TELEPHONE (OUTPATIENT)
Dept: HEMATOLOGY ONCOLOGY | Facility: CLINIC | Age: 63
End: 2018-05-21

## 2018-05-21 VITALS
BODY MASS INDEX: 18.61 KG/M2 | WEIGHT: 105 LBS | DIASTOLIC BLOOD PRESSURE: 62 MMHG | HEART RATE: 103 BPM | HEIGHT: 63 IN | TEMPERATURE: 99.4 F | OXYGEN SATURATION: 94 % | RESPIRATION RATE: 16 BRPM | SYSTOLIC BLOOD PRESSURE: 110 MMHG

## 2018-05-21 DIAGNOSIS — C25.2 MALIGNANT NEOPLASM OF TAIL OF PANCREAS (HCC): Primary | ICD-10-CM

## 2018-05-21 LAB
ATRIAL RATE: 87 BPM
P AXIS: 80 DEGREES
PR INTERVAL: 132 MS
QRS AXIS: 39 DEGREES
QRSD INTERVAL: 92 MS
QT INTERVAL: 388 MS
QTC INTERVAL: 466 MS
T WAVE AXIS: 70 DEGREES
VENTRICULAR RATE: 87 BPM

## 2018-05-21 PROCEDURE — 99214 OFFICE O/P EST MOD 30 MIN: CPT | Performed by: INTERNAL MEDICINE

## 2018-05-21 PROCEDURE — 93010 ELECTROCARDIOGRAM REPORT: CPT | Performed by: INTERNAL MEDICINE

## 2018-05-21 NOTE — TELEPHONE ENCOUNTER
Maria Alejandra calls this morning as follow up after being seen in the ER over the weekend  Her port is infected  She has started the oral antibiotics  The sight is tender  Her whole arm hurts to move  Gave her a same day appointment with Dr Bailey Whipple

## 2018-05-21 NOTE — PROGRESS NOTES
Hematology/Oncology Outpatient Follow- up Note  Efra Kirby 65 y o  female MRN: @ Encounter: [id number]        Date:  5/21/2018    Presenting Complaint/Diagnosis : T2 N0 M0 pancreatic cancer status post resection    HPI:      Efra Kirby is seen for initial consultation 5/4/2018 regarding A newly resected pancreatic cancer  She was seen here at [place] [address] by our colleagues in surgical oncology  They were planning a resection of a pancreatic mass which was biopsy-proven to be adenocarcinoma  The patient had no family or support system here in the [facility] so she decided to go to [place] where she has family to get her resection  She had her resection done and was found to have a T2 N0 M0 malignancy  Since she had no lymph nodes involved and was decided to give her 6 months of adjuvant chemotherapy with gemcitabine and Xeloda  From what she tells me she has been on Xeloda 1500 mg twice a day and gemcitabine thousand milligrams per meter square day 1 and 8 and 15  Xeloda is 2 out of 4 weeks  The gemcitabine was 3 out of 4 weeks  She got one cycle and then moved back home to [facility] so wishes to establish care  Previous Hematologic/ Oncologic History:    Surgery    Current Hematologic/ Oncologic Treatment:      Gemcitabine and Xeloda  Xeloda is 1500 mg twice a day and gemcitabine thousand milligrams per meter square day 1 and 8 and 15  Xeloda is 2 out of 4 weeks  The gemcitabine was 3 out of 4 weeks  This is the exact regimen and doses she was on in [place] and she got one cycle there  Cycle #2 started at [place] [address] on 18 May  Interval History:      The patient returns for follow-up visit  She started her cycle here at [place] [address] on 18 May 2018  SHe is currently getting the same regimen she got [place]  We did discuss the Xeloda dose with her physician in [place] as the original article called for a slightly lower dose  mg/m2 twice a day for 3 out of 4 weeks   They were not clear as to why they had used 1500 mg twice a day for 2 out of 4 weeks  Regardless and she did well we decided to continue her and we will reevaluate the dose for cycle #3 depending on how she does  She was in the emergency room yesterday for some redness around her port  She was started on antibiotic  Today does not look fluctuant and she has mild erythema around it  She is on an antibiotic  She states the redness is slightly better from what she can tell  Denies any Shortness of breath or focal neurological signs  Has a lot of generalized aches and pains  She is seeing our colleagues in palliative care for this  She seems depressed  Denies any suicidal ideation  Denies any nausea denies any vomiting  The rest of her 14 point review of systems today was negative  Test Results:    Imaging: Ct Abdomen Pelvis Wo Contrast    Result Date: 5/20/2018  Narrative: CT ABDOMEN AND PELVIS WITHOUT IV CONTRAST INDICATION:   Lower abdominal pain COMPARISON: 1/5/2018 TECHNIQUE:  CT examination of the abdomen and pelvis was performed without intravenous contrast   Axial, sagittal, and coronal 2D reformatted images were created from the source data and submitted for interpretation  Radiation dose length product (DLP) for this visit:  187 5 mGy-cm   This examination, like all CT scans performed in the [facility], was performed utilizing techniques to minimize radiation dose exposure, including the use of iterative reconstruction and automated exposure control  Enteric contrast was not administered in accordance with physician request  FINDINGS: ABDOMEN LOWER CHEST:  Again seen is bibasilar interstitial lung disease with groundglass component  LIVER/BILIARY TREE:  There is a Riedel's lobe of the liver  Unenhanced liver is otherwise unremarkable  GALLBLADDER:  Gallbladder is surgically absent  SPLEEN:  The spleen is surgically absent  PANCREAS:  The distal pancreas is surgically absent including the previously seen mass  ADRENAL GLANDS:  Unremarkable  KIDNEYS/URETERS:  Unremarkable  No hydronephrosis  STOMACH AND BOWEL:  Unremarkable  APPENDIX:  No findings to suggest appendicitis  ABDOMINOPELVIC CAVITY:  No ascites or free intraperitoneal air  No lymphadenopathy  VESSELS:  Unremarkable for patient's age  PELVIS REPRODUCTIVE ORGANS:  Unremarkable for patient's age  URINARY BLADDER:  Unremarkable  ABDOMINAL WALL/INGUINAL REGIONS:  Unremarkable  OSSEOUS STRUCTURES:  No acute fracture or destructive osseous lesion  Impression: 1  Limited study without intravenous or oral contrast   No definite acute abnormality identified  2   Stable interstitial lung disease  Workstation performed: [id number]     Xr Chest 2 Views    Result Date: 5/20/2018  Narrative: CHEST INDICATION:   Fever and upper back pain, shortness of breath  COMPARISON:  1/5/2018 EXAM PERFORMED/VIEWS:  XR CHEST AP & LATERAL FINDINGS:  There is an Eihyvv-k-Iioe catheter with its tip at the cavoatrial junction The cardiac silhouette is without change from the prior study  Examination demonstrates pulmonary emphysema and interstitial lung disease  Findings are similar to the previous study  There is no significant effusion or pneumothorax  No acute infiltrate is seen  Osseous structures appear within normal limits for patient age  Impression: Pulmonary interstitial fibrosis and emphysema  No significant interval change  Workstation performed: [id number]     Fl Guided Central Venous Access Device Insertion    Result Date: 5/16/2018  Narrative: Chest port placement 5/15/2018 History: Pancreatic carcinoma Procedure: The patient was identified verbally and by wristband  Timeout was performed  Informed consent was obtained  All elements of maximal sterile barrier technique were followed (cap, mask, sterile gown, sterile gloves, large sterile sheet, hand hygiene and 2% chlorhexidine for cutaneous antisepsis)   Following obtaining informed consent, the patient was prepped and draped in the usual sterile fashion  Using ultrasound guidance, access was gained to the patient's right internal jugular vein using a micropuncture system  The micropuncture wire was removed and a  035 wire was advanced to the level of the inferior vena cava using fluoroscopic guidance  Using 1% lidocaine, the region of the right anterior chest was was anesthetized  A small incision was made using a 15 blade scalpel  The port pocket was created using blunt dissection  The catheter tubing was tunneled from the incision to the venotomy  The micropuncture dilator was exchanged for a peel-away sheath, using fluoroscopic guidance  The catheter was place through the peel-away sheath  The catheter was measured and cut to size  The catheter was attached to the port  The port was flushed with saline to ensure patency without evidence of leakage  The port was placed in the port pocket  The port was sutured in the pocket using 3-0 Vicryl  The incisions were approximated with 3-0 Vicryl and Histoacryl  The patient tolerated the procedure well without apparent immediate complications  The patient left the IR department in unchanged condition  Dr Rafael Forde performed and directly supervised the entire procedure  Findings: Using ultrasound guidance, the right internal jugular vein was cannulated using Seldinger technique  The right internal jugular vein was evaluated as a potential access site  The right internal jugular vein was patent, and free of thrombus  Static images of the vessel was obtained  Visualization of real time needle entry into the vessel was obtained  Fluoroscopic spot image demonstrates a newly placed single lumen chest port via the right internal jugular vein with the most central aspect at the SVC/RA junction  The catheter tubing is smooth in contour  Impression: Impression:  Successful placement of a single lumen chest port via the right internal jugular vein   Workstation performed: [id number]       Labs:   Lab Results   Component Value Date    WBC 12 76 (H) 05/20/2018    HGB 10 4 (L) 05/20/2018    HCT 31 4 (L) 05/20/2018     (H) 05/20/2018     (H) 05/20/2018     Lab Results   Component Value Date     05/20/2018    K 3 9 05/20/2018     05/20/2018    CO2 29 05/20/2018    ANIONGAP 7 05/20/2018    BUN 15 05/20/2018    CREATININE 0 60 05/20/2018    GLUCOSE 98 05/20/2018    GLUF 100 (H) 05/11/2018    CALCIUM 8 6 05/20/2018    AST 56 (H) 05/20/2018    ALT 25 05/20/2018    ALKPHOS 78 05/20/2018    PROT 7 5 05/20/2018    BILITOT 0 80 05/20/2018    EGFR 99 05/20/2018           Lab Results   Component Value Date    QVKNOMQQ30 338 06/20/2016         ROS: As stated in the history of present illness otherwise his 14 point review of systems today was negative  Active Problems:   Patient Active Problem List   Diagnosis    Malignant neoplasm of tail of pancreas [facility] [address] [facility]    Psychiatric disorder    Malignant cachexia ([facility] [address] [facility]    Anxiety about health    Depression    Unintended weight loss    Chronic low back pain with bilateral sciatica    TMJ (temporomandibular joint syndrome)    Health care maintenance       Past Medical History:   Past Medical History:   Diagnosis Date    Anxiety     Cancer ([facility] [address] [facility]     Chronic pain disorder     back    Disease of thyroid gland     GERD (gastroesophageal reflux disease)     Pancreatic mass     Psychiatric disorder     anxiety       Surgical History:   Past Surgical History:   Procedure Laterality Date    APPENDECTOMY      BACK SURGERY      CHOLECYSTECTOMY      LEG SURGERY      right and left  osteo chondroma removed    LINEAR ENDOSCOPIC U/S N/A 1/16/2018    Procedure: LINEAR ENDOSCOPIC U/S;  Surgeon: Sheryle Congress, MD;  Location: [facility];   Service: Gastroenterology    MI INSJ TUNNELED CTR VAD W/SUBQ PORT AGE 5 YR/> N/A 5/15/2018    Procedure: INSERTION VENOUS PORT ( PORT-A-CATH) IR;  Surgeon: Cornelius Cheatham DO;  [facility];  Service: Interventional Radiology    TONSILLECTOMY      WHIPPLE PROCEDURE W/ LAPAROSCOPY         Family History:    Family History   Problem Relation Age of Onset    Cancer Father        Cancer-related family history includes Cancer in her father  Social History:   Social History     Social History    Marital status:      Spouse name: N/A    Number of children: N/A    Years of education: N/A     Occupational History    Not on file       Social History Main Topics    Smoking status: Current Every Day Smoker    Smokeless tobacco: Never Used    Alcohol use No    Drug use: Yes     Types: Marijuana      Comment: 1 month    Sexual activity: Not Currently     Other Topics Concern    Not on file     Social History Narrative    Wears seatbelt    No living will    Denies exercise habits    Regular dental care        Current Medications:   Current Outpatient Prescriptions   Medication Sig Dispense Refill    amoxicillin (AMOXIL) 875 mg tablet Take 875 mg by mouth 2 (two) times a day  0    capecitabine (XELODA) 500 MG tablet Take 3 tablets (1,500 mg total) by mouth 2 (two) times a day 180 tablet 2    cephalexin (KEFLEX) 500 mg capsule Take 1 capsule (500 mg total) by mouth 4 (four) times a day for 7 days 28 capsule 0    diazepam (VALIUM) 5 mg tablet Take 1 tablet (5 mg total) by mouth every 12 (twelve) hours as needed for anxiety 30 tablet 0    divalproex sodium (DEPAKOTE) 125 mg EC tablet 1 po bid 60 tablet 0    FLUoxetine (PROzac) 40 MG capsule Take 1 capsule (40 mg total) by mouth daily 30 capsule 1    levothyroxine 25 mcg tablet Take 25 mcg by mouth daily      oxyCODONE (ROXICODONE) 5 mg immediate release tablet Take 1 tablet (5 mg total) by mouth every 8 (eight) hours as needed for moderate pain Max Daily Amount: 15 mg 100 tablet 0    pancrelipase, Lip-Prot-Amyl, (CREON) 12,000 units capsule Take 12,000 units of lipase by mouth 3 (three) times a day with meals for 180 doses 180 capsule 0  pantoprazole (PROTONIX) 40 mg tablet Take 40 mg by mouth daily      senna-docusate sodium (SENOKOT-S) 8 6-50 mg per tablet Take 2 tablets by mouth daily for 30 days 7 tablet 0     No current facility-administered medications for this visit  Allergies: Allergies   Allergen Reactions    Codeine Other (See Comments)     Feels crazy when taking it       Physical Exam:    There is no height or weight on file to calculate BSA  Wt Readings from Last 3 Encounters:   05/20/18 47 2 kg (104 lb)   05/18/18 46 8 kg (103 lb 2 8 oz)   05/15/18 47 2 kg (104 lb)        Temp Readings from Last 3 Encounters:   05/20/18 100 3 °F (37 9 °C) (Tympanic)   05/18/18 98 4 °F (36 9 °C) (Tympanic)   05/15/18 98 °F (36 7 °C)        BP Readings from Last 3 Encounters:   05/20/18 126/59   05/18/18 122/64   05/15/18 115/57         Pulse Readings from Last 3 Encounters:   05/20/18 85   05/18/18 90   05/15/18 86      Physical Exam     Constitutional   General appearance: No acute distress, well appearing and well nourished  Eyes   Conjunctiva and lids: No swelling, erythema or discharge  Pupils and irises: Equal, round and reactive to light  Ears, Nose, Mouth, and Throat   External inspection of ears and nose: Normal     Nasal mucosa, septum, and turbinates: Normal without edema or erythema  Oropharynx: Normal with no erythema, edema, exudate or lesions  Pulmonary   Respiratory effort: No increased work of breathing or signs of respiratory distress  Auscultation of lungs: Clear to auscultation  Cardiovascular   Palpation of heart: Normal PMI, no thrills  Auscultation of heart: Normal rate and rhythm, normal S1 and S2, without murmurs  Examination of extremities for edema and/or varicosities: Normal     Carotid pulses: Normal     Abdomen   Abdomen: Non-tender, no masses  Liver and spleen: No hepatomegaly or splenomegaly  Lymphatic   Palpation of lymph nodes in neck: No lymphadenopathy  Musculoskeletal   Gait and station: Normal     Digits and nails: Normal without clubbing or cyanosis  Inspection/palpation of joints, bones, and muscles: Normal     Skin   Skin and subcutaneous tissue: Normal without rashes or lesions  Neurologic   Cranial nerves: Cranial nerves 2-12 intact  Sensation: No sensory loss  Psychiatric   Orientation to person, place, and time: Normal     Mood and affect: Normal         Assessment / Plan:    Patient is a pleasant 78-year-old female with newly diagnosed localized pancreatic cancer status post resection  She got one cycle of adjuvant chemotherapy in [place] prior to transferring back to the [facility]  She had a port placed here and started her second cycle  There was some confusion over her Xeloda dose as the article based on which she is being treated cause for 830 mg meter squared twice a day for 3 out of 4 weeks  She was actually getting 1500 mg twice a day 2 out of 4 weeks  Since she did well with her first cycle we continued the same regimen  If she does well we may consider switching her  For now she was in the emergency room yesterday with a question of a port infection  She started on oral antibiotics  I will continue her on her current dosages  If she spikes a fever or if she develops any fluctuance or worsening erythema around the port she will call our office or go back to the emergency room  Right now she does not seem to have an overt infection based on the fact that she has no fluctuance and very mild erythema around the port  The patient agrees  We'll continue her on her chemotherapy  I'll see her back in 3-4 weeks  Goals and Barriers:  Current Goal:  Prolong Survival from pancreatic cancer  Barriers: None  Patient's Capacity to Self Care:  Patient able to self care      Portions of the record may have been created with voice recognition software   Occasional wrong word or "sound a like" substitutions may have occurred due to the inherent limitations of voice recognition software   Read the chart carefully and recognize, using context, where substitutions have occurred

## 2018-05-22 ENCOUNTER — OFFICE VISIT (OUTPATIENT)
Dept: PALLIATIVE MEDICINE | Facility: HOSPITAL | Age: 63
End: 2018-05-22
Payer: COMMERCIAL

## 2018-05-22 VITALS
BODY MASS INDEX: 18.75 KG/M2 | DIASTOLIC BLOOD PRESSURE: 60 MMHG | OXYGEN SATURATION: 90 % | TEMPERATURE: 98.3 F | RESPIRATION RATE: 18 BRPM | SYSTOLIC BLOOD PRESSURE: 122 MMHG | WEIGHT: 101.9 LBS | HEIGHT: 62 IN | HEART RATE: 90 BPM

## 2018-05-22 DIAGNOSIS — M26.609 TMJ (TEMPOROMANDIBULAR JOINT SYNDROME): ICD-10-CM

## 2018-05-22 DIAGNOSIS — M19.011 ARTHRITIS OF RIGHT ACROMIOCLAVICULAR JOINT: ICD-10-CM

## 2018-05-22 DIAGNOSIS — C25.2 MALIGNANT NEOPLASM OF TAIL OF PANCREAS (HCC): Primary | ICD-10-CM

## 2018-05-22 DIAGNOSIS — F41.8 ANXIETY ABOUT HEALTH: ICD-10-CM

## 2018-05-22 PROCEDURE — 99213 OFFICE O/P EST LOW 20 MIN: CPT | Performed by: FAMILY MEDICINE

## 2018-05-22 RX ORDER — LORAZEPAM 0.5 MG/1
0.5 TABLET ORAL EVERY 8 HOURS PRN
Qty: 90 TABLET | Refills: 0 | Status: SHIPPED | OUTPATIENT
Start: 2018-05-22 | End: 2018-10-16 | Stop reason: SDUPTHER

## 2018-05-22 RX ORDER — IBUPROFEN 800 MG/1
800 TABLET ORAL EVERY 8 HOURS PRN
Qty: 60 TABLET | Refills: 0 | Status: SHIPPED | OUTPATIENT
Start: 2018-05-22 | End: 2018-07-17 | Stop reason: SDUPTHER

## 2018-05-22 NOTE — PATIENT INSTRUCTIONS
Some concern for gout versus simple AC acute inflammation from overuse  Will get lab work for uric acid and CRP  Add ibuprofen 800 tid prn with food  Return 1 week  Support provised

## 2018-05-23 RX ORDER — SODIUM CHLORIDE 9 MG/ML
20 INJECTION, SOLUTION INTRAVENOUS CONTINUOUS
Status: DISPENSED | OUTPATIENT
Start: 2018-05-24 | End: 2018-05-25

## 2018-05-23 RX ORDER — SODIUM CHLORIDE 9 MG/ML
20 INJECTION, SOLUTION INTRAVENOUS CONTINUOUS
Status: DISPENSED | OUTPATIENT
Start: 2018-05-31 | End: 2018-06-01

## 2018-05-23 RX ORDER — ACETAMINOPHEN 325 MG/1
650 TABLET ORAL ONCE
Status: DISCONTINUED | OUTPATIENT
Start: 2018-05-24 | End: 2018-05-27 | Stop reason: HOSPADM

## 2018-05-24 ENCOUNTER — HOSPITAL ENCOUNTER (OUTPATIENT)
Dept: INFUSION CENTER | Facility: HOSPITAL | Age: 63
Discharge: HOME | End: 2018-05-24

## 2018-05-24 VITALS — WEIGHT: 103.17 LBS | OXYGEN SATURATION: 98 % | BODY MASS INDEX: 18.87 KG/M2 | HEART RATE: 88 BPM | TEMPERATURE: 98.6 F

## 2018-05-24 DIAGNOSIS — M19.011 ARTHRITIS OF RIGHT ACROMIOCLAVICULAR JOINT: ICD-10-CM

## 2018-05-24 NOTE — PROGRESS NOTES
Pt arrives on unit today for chemotherapy  Pt was seen in the ER for redness and pain around her port and shoulder area  Notified Nissa Lechuga RN of redness remaining on and around port site  As per Mynor Cristobal RN we can treat pt using a peripheral IV  After 2 unsuccessful attempts pt refused to be stuck again  Notified Nissa Lechuga RN  Pt treatment will be held today  Pt will come back on day 15  Pt then left unit ambulatory with a steady gait

## 2018-05-25 LAB
BACTERIA BLD CULT: NORMAL
BACTERIA BLD CULT: NORMAL

## 2018-05-28 RX ORDER — ACETAMINOPHEN 325 MG/1
650 TABLET ORAL ONCE
Status: DISCONTINUED | OUTPATIENT
Start: 2018-05-31 | End: 2018-05-31

## 2018-05-29 ENCOUNTER — OFFICE VISIT (OUTPATIENT)
Dept: PALLIATIVE MEDICINE | Facility: HOSPITAL | Age: 63
End: 2018-05-29
Payer: COMMERCIAL

## 2018-05-29 ENCOUNTER — OFFICE VISIT (OUTPATIENT)
Dept: PALLIATIVE MEDICINE | Facility: HOSPITAL | Age: 63
End: 2018-05-29

## 2018-05-29 VITALS
HEART RATE: 78 BPM | DIASTOLIC BLOOD PRESSURE: 50 MMHG | SYSTOLIC BLOOD PRESSURE: 100 MMHG | WEIGHT: 105 LBS | OXYGEN SATURATION: 98 % | RESPIRATION RATE: 18 BRPM | BODY MASS INDEX: 19.2 KG/M2 | TEMPERATURE: 97.8 F

## 2018-05-29 DIAGNOSIS — F41.8 ANXIETY ABOUT HEALTH: ICD-10-CM

## 2018-05-29 DIAGNOSIS — C25.2 MALIGNANT NEOPLASM OF TAIL OF PANCREAS (HCC): Primary | ICD-10-CM

## 2018-05-29 DIAGNOSIS — Z71.89 COUNSELING AND COORDINATION OF CARE: Primary | ICD-10-CM

## 2018-05-29 DIAGNOSIS — F32.9 REACTIVE DEPRESSION: ICD-10-CM

## 2018-05-29 DIAGNOSIS — M26.609 TMJ (TEMPOROMANDIBULAR JOINT SYNDROME): ICD-10-CM

## 2018-05-29 DIAGNOSIS — M19.011 ARTHRITIS OF RIGHT ACROMIOCLAVICULAR JOINT: ICD-10-CM

## 2018-05-29 PROCEDURE — 99499 UNLISTED E&M SERVICE: CPT

## 2018-05-29 PROCEDURE — 99213 OFFICE O/P EST LOW 20 MIN: CPT | Performed by: FAMILY MEDICINE

## 2018-05-29 NOTE — PROGRESS NOTES
Obie Cannon presents for follow up today  She continues to struggle with her grief over Harris's death and having cancer herself  She was unable to get chemo last week because of her port-a-cath being infected and verbalized her frustration over her complications she has had since getting her port  She is supposed to have chemo on  at [facility] if her port can be accessed  Obie Cannon is back to work at the [employer] on a part-time basis  She could not understand why she experiences pain in her R shoulder and abdomen and was gently reminded she endured a complex surgery and is going through cancer treatment  Spent a long time letting Obie Cannon talk about her feelings and what a typical day looks like for her now  She wishes for her life to be "back to normal" like before Jenny Robertson  and she got diagnosed with cancer, yet she does acknowledge that is not realistic  She expressed fear and anxiety over losing income and having no money to pay for rent, food, etc   She spoke with Social Security and was advised to apply for disability online, however she explained she cannot navigate computers well  EMMETT offered to assist with application in [facility] [place] office, and appointment made next week  at 1 pm   EMMETT provided paper [place] application and encouraged her to complete at home  Will fax application for her next week  She prides herself on her work ethic and is struggling to accept she must cut back or stop for her health  Pt  Very tearful today  Emotional support provided

## 2018-05-29 NOTE — PROGRESS NOTES
Assessment/Plan:    No problem-specific Assessment & Plan notes found for this encounter  Diagnoses and all orders for this visit:    Malignant neoplasm of tail of pancreas (HCC)    TMJ (temporomandibular joint syndrome)    Arthritis of right acromioclavicular joint    Anxiety about health    Reactive depression          Subjective:      Patient ID: Noris Jaramillo is a 65 y o  female  Glory Yee returns in outpatient follow-up  Her TMJ has significantly improved  Her right AC inflammation is markedly improved and she revealed that she had been having to pull a long industrial hose at work by draping the hose over her right shoulder  The inflammation around her port has improved, however there is continued swelling and tenderness over the port  She is scheduled for chemotherapy on Friday and is hoping they will be able to do this  She is feeling more frustrated and anxious and overwhelmed about her prognosis and her ability to work  She is finding benefit from working with the MSW  She has no fever  She remains fatigued  The following portions of the patient's history were reviewed and updated as appropriate: allergies, current medications, past family history, past medical history, past social history, past surgical history and problem list     Review of Systems   Constitutional: Positive for activity change, appetite change and fatigue  Negative for chills, diaphoresis, fever and unexpected weight change  HENT: Negative  Eyes: Negative  Respiratory: Negative  Cardiovascular: Negative  Gastrointestinal: Positive for abdominal pain  Negative for abdominal distention, anal bleeding, blood in stool, constipation, diarrhea, nausea, rectal pain and vomiting  Endocrine: Negative  Genitourinary: Negative  Musculoskeletal: Positive for myalgias  Skin: Negative  Allergic/Immunologic: Negative  Neurological: Negative  Hematological: Negative      Psychiatric/Behavioral: Positive for dysphoric mood  The patient is nervous/anxious  Objective:      /50 (BP Location: Right arm, Patient Position: Sitting, Cuff Size: Standard)   Pulse 78   Temp 97 8 °F (36 6 °C) (Oral)   Resp 18   Wt 47 6 kg (105 lb)   LMP  (LMP Unknown)   SpO2 98%   BMI 19 20 kg/m²          Physical Exam   Constitutional: She is oriented to person, place, and time  She appears well-developed and well-nourished  No distress  HENT:   Head: Normocephalic and atraumatic  Right Ear: External ear normal    Left Ear: External ear normal    Nose: Nose normal    Mouth/Throat: Oropharynx is clear and moist  No oropharyngeal exudate  Eyes: Conjunctivae and EOM are normal  Pupils are equal, round, and reactive to light  Right eye exhibits no discharge  Left eye exhibits no discharge  No scleral icterus  Neck: Normal range of motion  Neck supple  No JVD present  No tracheal deviation present  No thyromegaly present  Cardiovascular: Normal rate and regular rhythm  Pulmonary/Chest: Effort normal and breath sounds normal  No stridor  Abdominal: Soft  Bowel sounds are normal  She exhibits no distension  There is tenderness  Musculoskeletal: Normal range of motion  She exhibits no edema, tenderness or deformity  Lymphadenopathy:     She has no cervical adenopathy  Neurological: She is alert and oriented to person, place, and time  She has normal reflexes  She displays normal reflexes  No cranial nerve deficit  She exhibits normal muscle tone  Coordination normal    Skin: Skin is warm and dry  No rash noted  She is not diaphoretic  No erythema  No pallor  Psychiatric: Her speech is normal  Judgment and thought content normal  Her mood appears anxious  She is withdrawn  Cognition and memory are normal  She exhibits a depressed mood  Vitals reviewed

## 2018-05-31 ENCOUNTER — DOCUMENTATION (OUTPATIENT)
Dept: NUTRITION | Facility: HOSPITAL | Age: 63
End: 2018-05-31

## 2018-05-31 ENCOUNTER — HOSPITAL ENCOUNTER (OUTPATIENT)
Dept: INFUSION CENTER | Facility: HOSPITAL | Age: 63
Discharge: HOME | End: 2018-05-31
Payer: COMMERCIAL

## 2018-05-31 VITALS
HEART RATE: 79 BPM | DIASTOLIC BLOOD PRESSURE: 53 MMHG | TEMPERATURE: 97.7 F | RESPIRATION RATE: 16 BRPM | HEIGHT: 62 IN | OXYGEN SATURATION: 97 % | BODY MASS INDEX: 19.23 KG/M2 | WEIGHT: 104.5 LBS | SYSTOLIC BLOOD PRESSURE: 101 MMHG

## 2018-05-31 DIAGNOSIS — C25.2 MALIGNANT NEOPLASM OF TAIL OF PANCREAS (HCC): ICD-10-CM

## 2018-05-31 LAB
ALBUMIN SERPL BCP-MCNC: 3 G/DL (ref 3.5–5)
ALP SERPL-CCNC: 80 U/L (ref 46–116)
ALT SERPL W P-5'-P-CCNC: 16 U/L (ref 12–78)
ANION GAP SERPL CALCULATED.3IONS-SCNC: 9 MMOL/L (ref 4–13)
AST SERPL W P-5'-P-CCNC: 15 U/L (ref 5–45)
BASOPHILS # BLD AUTO: 0.05 THOUSANDS/ΜL (ref 0–0.1)
BASOPHILS NFR BLD AUTO: 0 % (ref 0–1)
BILIRUB SERPL-MCNC: 0.9 MG/DL (ref 0.2–1)
BUN SERPL-MCNC: 18 MG/DL (ref 5–25)
CALCIUM SERPL-MCNC: 8.8 MG/DL (ref 8.3–10.1)
CHLORIDE SERPL-SCNC: 102 MMOL/L (ref 100–108)
CO2 SERPL-SCNC: 28 MMOL/L (ref 21–32)
CREAT SERPL-MCNC: 0.67 MG/DL (ref 0.6–1.3)
CRP SERPL QL: 10.5 MG/L
EOSINOPHIL # BLD AUTO: 0.33 THOUSAND/ΜL (ref 0–0.61)
EOSINOPHIL NFR BLD AUTO: 3 % (ref 0–6)
ERYTHROCYTE [DISTWIDTH] IN BLOOD BY AUTOMATED COUNT: 18.6 % (ref 11.6–15.1)
GFR SERPL CREATININE-BSD FRML MDRD: 95 ML/MIN/1.73SQ M
GLUCOSE P FAST SERPL-MCNC: 120 MG/DL (ref 65–99)
GLUCOSE SERPL-MCNC: 120 MG/DL (ref 65–140)
HCT VFR BLD AUTO: 32.8 % (ref 34.8–46.1)
HGB BLD-MCNC: 10.7 G/DL (ref 11.5–15.4)
IMM GRANULOCYTES # BLD AUTO: 0.15 THOUSAND/UL (ref 0–0.2)
IMM GRANULOCYTES NFR BLD AUTO: 1 % (ref 0–2)
LYMPHOCYTES # BLD AUTO: 2.24 THOUSANDS/ΜL (ref 0.6–4.47)
LYMPHOCYTES NFR BLD AUTO: 18 % (ref 14–44)
MCH RBC QN AUTO: 34.9 PG (ref 26.8–34.3)
MCHC RBC AUTO-ENTMCNC: 32.6 G/DL (ref 31.4–37.4)
MCV RBC AUTO: 107 FL (ref 82–98)
MONOCYTES # BLD AUTO: 2.05 THOUSAND/ΜL (ref 0.17–1.22)
MONOCYTES NFR BLD AUTO: 17 % (ref 4–12)
NEUTROPHILS # BLD AUTO: 7.64 THOUSANDS/ΜL (ref 1.85–7.62)
NEUTS SEG NFR BLD AUTO: 61 % (ref 43–75)
PLATELET # BLD AUTO: 319 THOUSANDS/UL (ref 149–390)
PMV BLD AUTO: 9.3 FL (ref 8.9–12.7)
POTASSIUM SERPL-SCNC: 4.4 MMOL/L (ref 3.5–5.3)
PROT SERPL-MCNC: 7 G/DL (ref 6.4–8.2)
RBC # BLD AUTO: 3.07 MILLION/UL (ref 3.81–5.12)
SODIUM SERPL-SCNC: 139 MMOL/L (ref 136–145)
URATE SERPL-MCNC: 3.8 MG/DL (ref 2–6.8)
WBC # BLD AUTO: 12.46 THOUSAND/UL (ref 4.31–10.16)

## 2018-05-31 PROCEDURE — 84550 ASSAY OF BLOOD/URIC ACID: CPT | Performed by: FAMILY MEDICINE

## 2018-05-31 PROCEDURE — 80053 COMPREHEN METABOLIC PANEL: CPT | Performed by: INTERNAL MEDICINE

## 2018-05-31 PROCEDURE — 86140 C-REACTIVE PROTEIN: CPT | Performed by: FAMILY MEDICINE

## 2018-05-31 PROCEDURE — 85025 COMPLETE CBC W/AUTO DIFF WBC: CPT | Performed by: INTERNAL MEDICINE

## 2018-05-31 PROCEDURE — 96413 CHEMO IV INFUSION 1 HR: CPT

## 2018-05-31 PROCEDURE — 96367 TX/PROPH/DG ADDL SEQ IV INF: CPT

## 2018-05-31 RX ORDER — ACETAMINOPHEN 325 MG/1
650 TABLET ORAL ONCE
Status: COMPLETED | OUTPATIENT
Start: 2018-05-31 | End: 2018-05-31

## 2018-05-31 RX ADMIN — GEMCITABINE 1400 MG: 38 INJECTION, SOLUTION INTRAVENOUS at 12:11

## 2018-05-31 RX ADMIN — DEXAMETHASONE SODIUM PHOSPHATE: 10 INJECTION, SOLUTION INTRAMUSCULAR; INTRAVENOUS at 11:35

## 2018-05-31 RX ADMIN — SODIUM CHLORIDE 20 ML/HR: 9 INJECTION, SOLUTION INTRAVENOUS at 11:32

## 2018-05-31 RX ADMIN — Medication 300 UNITS: at 12:53

## 2018-05-31 RX ADMIN — ACETAMINOPHEN 650 MG: 325 TABLET, FILM COATED ORAL at 11:32

## 2018-05-31 NOTE — PROGRESS NOTES
Spoke with pt for some time  Pt grieving loss of  in Feb to cancer  Admits anxiety regarding finances, current dx and job situation  Like last visit, pt states her appetite flucuates but she eats even when she is not hungry as she knows she needs to  Pt currently at 104# which is stable from 5/18/18 visit  Continues to want to weigh 110#  Looking at magazines to get some food idea  Pt states she picks through the day doesn't really have set meals  will pick at fruit, joellen Tilley  Admits not always being hungry but tries to eat anyway  Does not like Ensure or Boost  Relates it to her  and his illness  Pt does not have any questions or concerns at this time  Pt with no acute nutritional issues

## 2018-05-31 NOTE — PROGRESS NOTES
Gemzar infused w/o adverse reaction  Pt offered no complaints  Port deaccessed per protocol  Pt then d/c'd to home with steady gait after having met with [facility] staff

## 2018-06-01 ENCOUNTER — TELEPHONE (OUTPATIENT)
Dept: HEMATOLOGY ONCOLOGY | Facility: CLINIC | Age: 63
End: 2018-06-01

## 2018-06-01 NOTE — TELEPHONE ENCOUNTER
Calling to confirm her next chemo schedule  She missed one week after port infection, so was not sure if the schedule in the computer is right (3 upcoming appointments)

## 2018-06-01 NOTE — TELEPHONE ENCOUNTER
Can you please confirm dates with her  Next treatment is 6/15 as scheduled  No adjustments need to be made

## 2018-06-05 ENCOUNTER — OFFICE VISIT (OUTPATIENT)
Dept: PALLIATIVE MEDICINE | Facility: HOSPITAL | Age: 63
End: 2018-06-05

## 2018-06-05 DIAGNOSIS — Z71.89 COUNSELING AND COORDINATION OF CARE: Primary | ICD-10-CM

## 2018-06-05 PROCEDURE — 99499 UNLISTED E&M SERVICE: CPT

## 2018-06-05 NOTE — PROGRESS NOTES
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 at [phone]  The SSD/SSI online portion was completed in this office with Eduardo Prince  She was instructed to finish her medical release form and gather medical records to be mailed to [facility] office  Online user account was unable to be created and suggested pt  Speak with Cancer Care Counselor (Celso London ) for assistance at her next chemo appointment on June 15th  Pt  Very thankful of help

## 2018-06-11 RX ORDER — SODIUM CHLORIDE 9 MG/ML
20 INJECTION, SOLUTION INTRAVENOUS CONTINUOUS
Status: DISPENSED | OUTPATIENT
Start: 2018-06-22 | End: 2018-06-23

## 2018-06-11 RX ORDER — SODIUM CHLORIDE 9 MG/ML
20 INJECTION, SOLUTION INTRAVENOUS CONTINUOUS
Status: DISPENSED | OUTPATIENT
Start: 2018-06-15 | End: 2018-06-16

## 2018-06-11 RX ORDER — SODIUM CHLORIDE 9 MG/ML
20 INJECTION, SOLUTION INTRAVENOUS CONTINUOUS
Status: DISPENSED | OUTPATIENT
Start: 2018-06-29 | End: 2018-06-30

## 2018-06-13 RX ORDER — ACETAMINOPHEN 325 MG/1
650 TABLET ORAL ONCE
Status: DISCONTINUED | OUTPATIENT
Start: 2018-06-15 | End: 2018-06-18 | Stop reason: HOSPADM

## 2018-06-15 ENCOUNTER — APPOINTMENT (OUTPATIENT)
Dept: LAB | Facility: HOSPITAL | Age: 63
End: 2018-06-15
Attending: INTERNAL MEDICINE
Payer: COMMERCIAL

## 2018-06-15 ENCOUNTER — HOSPITAL ENCOUNTER (OUTPATIENT)
Dept: INFUSION CENTER | Facility: HOSPITAL | Age: 63
Discharge: HOME | End: 2018-06-15
Payer: COMMERCIAL

## 2018-06-15 VITALS
TEMPERATURE: 98.7 F | RESPIRATION RATE: 18 BRPM | DIASTOLIC BLOOD PRESSURE: 56 MMHG | SYSTOLIC BLOOD PRESSURE: 97 MMHG | HEIGHT: 62 IN | HEART RATE: 70 BPM | WEIGHT: 107.14 LBS | BODY MASS INDEX: 19.72 KG/M2

## 2018-06-15 PROCEDURE — 96413 CHEMO IV INFUSION 1 HR: CPT

## 2018-06-15 PROCEDURE — 96367 TX/PROPH/DG ADDL SEQ IV INF: CPT

## 2018-06-15 RX ADMIN — DEXAMETHASONE SODIUM PHOSPHATE: 10 INJECTION, SOLUTION INTRAMUSCULAR; INTRAVENOUS at 09:42

## 2018-06-15 RX ADMIN — GEMCITABINE 1400 MG: 38 INJECTION, SOLUTION INTRAVENOUS at 10:19

## 2018-06-15 RX ADMIN — SODIUM CHLORIDE 20 ML/HR: 0.9 INJECTION, SOLUTION INTRAVENOUS at 09:42

## 2018-06-15 RX ADMIN — Medication 300 UNITS: at 10:54

## 2018-06-19 ENCOUNTER — TELEPHONE (OUTPATIENT)
Dept: PALLIATIVE MEDICINE | Facility: HOSPITAL | Age: 63
End: 2018-06-19

## 2018-06-19 DIAGNOSIS — F32.A DEPRESSION, UNSPECIFIED DEPRESSION TYPE: ICD-10-CM

## 2018-06-19 DIAGNOSIS — C25.2 MALIGNANT NEOPLASM OF TAIL OF PANCREAS (HCC): ICD-10-CM

## 2018-06-19 RX ORDER — FLUOXETINE HYDROCHLORIDE 40 MG/1
40 CAPSULE ORAL DAILY
Qty: 30 CAPSULE | Refills: 0 | Status: SHIPPED | OUTPATIENT
Start: 2018-06-19 | End: 2018-07-17 | Stop reason: SDUPTHER

## 2018-06-21 ENCOUNTER — APPOINTMENT (OUTPATIENT)
Dept: LAB | Facility: HOSPITAL | Age: 63
End: 2018-06-21
Attending: INTERNAL MEDICINE
Payer: COMMERCIAL

## 2018-06-21 RX ORDER — ACETAMINOPHEN 325 MG/1
650 TABLET ORAL ONCE
Status: COMPLETED | OUTPATIENT
Start: 2018-06-22 | End: 2018-06-22

## 2018-06-22 ENCOUNTER — HOSPITAL ENCOUNTER (OUTPATIENT)
Dept: INFUSION CENTER | Facility: HOSPITAL | Age: 63
Discharge: HOME | End: 2018-06-22
Payer: COMMERCIAL

## 2018-06-22 VITALS
WEIGHT: 104.28 LBS | RESPIRATION RATE: 16 BRPM | TEMPERATURE: 97.7 F | SYSTOLIC BLOOD PRESSURE: 97 MMHG | HEIGHT: 62 IN | DIASTOLIC BLOOD PRESSURE: 52 MMHG | BODY MASS INDEX: 19.19 KG/M2 | HEART RATE: 78 BPM

## 2018-06-22 PROCEDURE — 96367 TX/PROPH/DG ADDL SEQ IV INF: CPT

## 2018-06-22 PROCEDURE — 96413 CHEMO IV INFUSION 1 HR: CPT

## 2018-06-22 RX ADMIN — Medication 300 UNITS: at 14:51

## 2018-06-22 RX ADMIN — DEXAMETHASONE SODIUM PHOSPHATE: 10 INJECTION, SOLUTION INTRAMUSCULAR; INTRAVENOUS at 13:52

## 2018-06-22 RX ADMIN — ACETAMINOPHEN 650 MG: 325 TABLET, FILM COATED ORAL at 14:11

## 2018-06-22 RX ADMIN — GEMCITABINE 1400 MG: 38 INJECTION, SOLUTION INTRAVENOUS at 14:13

## 2018-06-22 RX ADMIN — SODIUM CHLORIDE 20 ML/HR: 0.9 INJECTION, SOLUTION INTRAVENOUS at 13:50

## 2018-06-22 NOTE — SOCIAL WORK
Met with pt during treatment today  Reviewed progress with Social Security Disability application  Pt continues to work as many hours as possible although she admits it is difficult  Financial difficulties continue due to bills owed from when she missed work during treatment in [place]  MSW will assist with ongoing applications and resource options  Emotional support provided re: grieving 's death in February, and adjustments to living alone  Pt attended monthly support group and states she will return  MSW will continue to monitor needs and provide support

## 2018-06-25 ENCOUNTER — OFFICE VISIT (OUTPATIENT)
Dept: HEMATOLOGY ONCOLOGY | Facility: HOSPITAL | Age: 63
End: 2018-06-25
Payer: COMMERCIAL

## 2018-06-25 ENCOUNTER — TELEPHONE (OUTPATIENT)
Dept: HEMATOLOGY ONCOLOGY | Facility: CLINIC | Age: 63
End: 2018-06-25

## 2018-06-25 VITALS
SYSTOLIC BLOOD PRESSURE: 120 MMHG | HEIGHT: 62 IN | HEART RATE: 97 BPM | TEMPERATURE: 99 F | DIASTOLIC BLOOD PRESSURE: 62 MMHG | RESPIRATION RATE: 16 BRPM | BODY MASS INDEX: 19.14 KG/M2 | WEIGHT: 104 LBS | OXYGEN SATURATION: 99 %

## 2018-06-25 DIAGNOSIS — C25.2 MALIGNANT NEOPLASM OF TAIL OF PANCREAS (HCC): Primary | ICD-10-CM

## 2018-06-25 PROCEDURE — 99214 OFFICE O/P EST MOD 30 MIN: CPT | Performed by: INTERNAL MEDICINE

## 2018-06-25 NOTE — TELEPHONE ENCOUNTER
PT WAS TOLD THAT HER BLOODWORK WAS ELEVATED BY DR ESPINO AND THAT HE WANTS HER TO GET A CT SCAN  DAUGHTER WOULD LIKE TO KNOW WHAT WAS ELEVATED IN HER RESULTS, WOULD LIKE CLARIFICATION OF WHAT IS HAPPENING     PLEASE CALL TO DISCUSS, TXS

## 2018-06-25 NOTE — PROGRESS NOTES
Hematology/Oncology Outpatient Follow- up Note  Carla Echols 65 y o  female MRN: @ Encounter: [id number]        Date:  6/25/2018    Presenting Complaint/Diagnosis : T2 N0 M0 pancreatic cancer status post resection    HPI    is seen for initial consultation 6/4[birth year]56 regarding A newly resected pancreatic cancer  She was seen here at [facility] by our colleagues in surgical oncology  They were planning a resection of a pancreatic mass which was biopsy-proven to be adenocarcinoma  The patient had no family or support system here in the [facility] area so she decided to go to [place] where she has family to get her resection  She had her resection done and was found to have a T2 N0 M0 malignancy  Since she had no lymph nodes involved and was decided to give her 6 months of adjuvant chemotherapy with gemcitabine and Xeloda  From what she tells me she has been on Xeloda 1500 mg twice a day and gemcitabine thousand milligrams per meter square day 1 and 8 and 15  Xeloda is 2 out of 4 weeks  The gemcitabine was 3 out of 4 weeks  She got one cycle and then moved back home to [facility] so wishes to establish care  Previous Hematologic/ Oncologic History:      Surgery    Current Hematologic/ Oncologic Treatment:      Gemcitabine and Xeloda  Xeloda is 1500 mg twice a day and gemcitabine thousand milligrams per meter square day 1 and 8 and 15  Xeloda is 2 out of 4 weeks  The gemcitabine was 3 out of 4 weeks  This is the exact regimen and doses she was on in [place] and she got one cycle there  Cycle #3 Total started on the 15th of June  This was the second cycle at [facility]  Cycle #4 total will start on 17 July  Interval History:    The patient returns for follow-up visit  She has been tolerating the chemotherapy recently well  She is fatigued  She continues to work but states she is probably going to get disability for now  I think this is reasonable   As far as her blood work is concerned her tumor marker has been elevated for the last 2 blood tests  This is concerning  She does have some abdominal pain which is worse with stretching  She had a CT scan of the abdomen for abdominal pain last month but this was without contrast  I am concerned about the elevated tumor markers so I will repeat imaging but do the scan with contrast  The patient is agreeable to this  Denies any nausea denies any vomiting  Denies any diarrhea  Does have occasional abdominal pain  She is seeing our colleagues in palliative care for this  The rest of her 14 point review of systems today was negative  Test Results:    Imaging: No results found  Labs:   Lab Results   Component Value Date    WBC 4 32 06/21/2018    HGB 9 5 (L) 06/21/2018    HCT 28 6 (L) 06/21/2018     (H) 06/21/2018     06/21/2018     Lab Results   Component Value Date     06/21/2018    K 4 0 06/21/2018     06/21/2018    CO2 29 06/21/2018    ANIONGAP 8 06/21/2018    BUN 17 06/21/2018    CREATININE 0 75 06/21/2018    GLUCOSE 135 06/21/2018    GLUF 120 (H) 05/31/2018    CALCIUM 8 3 06/21/2018    AST 14 06/21/2018    ALT 18 06/21/2018    ALKPHOS 77 06/21/2018    PROT 7 0 06/21/2018    BILITOT 0 30 06/21/2018    EGFR 86 06/21/2018         Lab Results   Component Value Date    XCJLVHIE64 338 06/20/2016         ROS: As stated in the history of present illness otherwise his 14 point review of systems today was negative        Active Problems:   Patient Active Problem List   Diagnosis    Malignant neoplasm of tail of pancreas [facility] [address]    Psychiatric disorder    Malignant cachexia ([facility] [address] [facility]    Anxiety about health    Depression    Unintended weight loss    Chronic low back pain with bilateral sciatica    TMJ (temporomandibular joint syndrome)    Health care maintenance    Arthritis of right acromioclavicular joint       Past Medical History:   Past Medical History:   Diagnosis Date    Anxiety     Cancer ([facility] [address] [facility]     Chronic pain disorder     back    Disease of thyroid gland     GERD (gastroesophageal reflux disease)     Pancreatic mass     Psychiatric disorder     anxiety       Surgical History:   Past Surgical History:   Procedure Laterality Date    APPENDECTOMY      BACK SURGERY      CHOLECYSTECTOMY      LEG SURGERY      right and left  osteo chondroma removed    LINEAR ENDOSCOPIC U/S N/A 1/16/2018    Procedure: LINEAR ENDOSCOPIC U/S;  Surgeon: Boby Mejia MD;  [facility]: [facility]; Service: Gastroenterology    FL INSJ TUNNELED CTR VAD W/SUBQ PORT AGE 5 YR/> N/A 5/15/2018    Procedure: INSERTION VENOUS PORT ( PORT-A-CATH) IR;  Surgeon: Janneth Razo DO;  [facility]: [facility];  Service: Interventional Radiology    TONSILLECTOMY      WHIPPLE PROCEDURE W/ LAPAROSCOPY         Family History:    Family History   Problem Relation Age of Onset    Cancer Father        Cancer-related family history includes Cancer in her father  Social History:   Social History     Social History    Marital status:      Spouse name: N/A    Number of children: N/A    Years of education: N/A     Occupational History    Not on file       Social History Main Topics    Smoking status: Current Every Day Smoker    Smokeless tobacco: Never Used    Alcohol use No    Drug use: Yes     Types: Marijuana      Comment: 1 month    Sexual activity: Not Currently     Other Topics Concern    Not on file     Social History Narrative    Wears seatbelt    No living will    Denies exercise habits    Regular dental care        Current Medications:   Current Outpatient Prescriptions   Medication Sig Dispense Refill    capecitabine (XELODA) 500 MG tablet Take 3 tablets (1,500 mg total) by mouth 2 (two) times a day 180 tablet 2    diazepam (VALIUM) 5 mg tablet Take 1 tablet (5 mg total) by mouth every 12 (twelve) hours as needed for anxiety 30 tablet 0    divalproex sodium (DEPAKOTE) 125 mg EC tablet 1 po bid 60 tablet 0    FLUoxetine (PROzac) 40 MG capsule Take 1 capsule (40 mg total) by mouth daily 30 capsule 0    ibuprofen (MOTRIN) 800 mg tablet Take 1 tablet (800 mg total) by mouth every 8 (eight) hours as needed for moderate pain 60 tablet 0    levothyroxine 25 mcg tablet Take 25 mcg by mouth daily      LORazepam (ATIVAN) 0 5 mg tablet Take 1 tablet (0 5 mg total) by mouth every 8 (eight) hours as needed for anxiety 90 tablet 0    oxyCODONE (ROXICODONE) 5 mg immediate release tablet Take 1 tablet (5 mg total) by mouth every 8 (eight) hours as needed for moderate pain Max Daily Amount: 15 mg 100 tablet 0    pancrelipase, Lip-Prot-Amyl, (CREON) 12,000 units capsule Take 12,000 units of lipase by mouth 3 (three) times a day with meals for 180 doses 180 capsule 0    pantoprazole (PROTONIX) 40 mg tablet Take 40 mg by mouth daily      senna-docusate sodium (SENOKOT-S) 8 6-50 mg per tablet Take 2 tablets by mouth daily for 30 days 7 tablet 0     No current facility-administered medications for this visit  Facility-Administered Medications Ordered in Other Visits   Medication Dose Route Frequency Provider Last Rate Last Dose    [START ON 6/29/2018] gemcitabine hcl (GEMZAR) 1,400 mg in sodium chloride 0 9 % 250 mL chemo infusion  1,400 mg Intravenous Once MD Júnior Patel [START ON 6/29/2018] ondansetron (ZOFRAN) 16 mg, dexamethasone (DECADRON) 10 mg in sodium chloride 0 9 % 50 mL IVPB   Intravenous Once MD Júnior Patel [START ON 6/29/2018] sodium chloride 0 9 % infusion  20 mL/hr Intravenous Continuous Nida Alexis MD           Allergies: Allergies   Allergen Reactions    Codeine Other (See Comments)     Feels crazy when taking it       Physical Exam:    Body surface area is 1 45 meters squared      Wt Readings from Last 3 Encounters:   06/25/18 47 2 kg (104 lb)   06/22/18 47 3 kg (104 lb 4 4 oz)   06/15/18 48 6 kg (107 lb 2 3 oz)        Temp Readings from Last 3 Encounters:   06/25/18 99 °F (37 2 °C) (Tympanic)   06/22/18 97 7 °F (36 5 °C) (Tympanic)   06/15/18 98 7 °F (37 1 °C) (Temporal)        BP Readings from Last 3 Encounters:   06/25/18 120/62   06/22/18 97/52   06/15/18 97/56         Pulse Readings from Last 3 Encounters:   06/25/18 97   06/22/18 78   06/15/18 70        Physical Exam     Constitutional   General appearance: No acute distress, well appearing and well nourished  Eyes   Conjunctiva and lids: No swelling, erythema or discharge  Pupils and irises: Equal, round and reactive to light  Ears, Nose, Mouth, and Throat   External inspection of ears and nose: Normal     Nasal mucosa, septum, and turbinates: Normal without edema or erythema  Oropharynx: Normal with no erythema, edema, exudate or lesions  Pulmonary   Respiratory effort: No increased work of breathing or signs of respiratory distress  Auscultation of lungs: Clear to auscultation  Cardiovascular   Palpation of heart: Normal PMI, no thrills  Auscultation of heart: Normal rate and rhythm, normal S1 and S2, without murmurs  Examination of extremities for edema and/or varicosities: Normal     Carotid pulses: Normal     Abdomen   Abdomen: Non-tender, no masses  Liver and spleen: No hepatomegaly or splenomegaly  Lymphatic   Palpation of lymph nodes in neck: No lymphadenopathy  Musculoskeletal   Gait and station: Normal     Digits and nails: Normal without clubbing or cyanosis  Inspection/palpation of joints, bones, and muscles: Normal     Skin   Skin and subcutaneous tissue: Normal without rashes or lesions  Neurologic   Cranial nerves: Cranial nerves 2-12 intact  Sensation: No sensory loss  Psychiatric   Orientation to person, place, and time: Normal     Mood and affect: Normal         Assessment / Plan:      Patient is a pleasant 72-year-old female with newly diagnosed localized pancreatic cancer status post resection  She got one cycle of adjuvant chemotherapy in [place] prior to transferring back to the [facility]   She had a port placed here and started her second cycle  There was some confusion over her Xeloda dose as the article based on which she is being treated calls for 830 mg meter squared twice a day for 3 out of 4 weeks  She was actually getting 1500 mg twice a day 2 out of 4 weeks  Cytopenias 1000 M square day 1, 8, 15 every 28 days  Since she did well with her first cycle we continued the same regimen  Cycle #3 will start on July 13  Her CA-19-9 on her last 2 measurements was elevated  She did have a CT scan of the abdomen and pelvis without contrast in May for abdominal pain which did not show any major abnormalities  The liver with the elevated CA-19-9 and the fact that her last CAT scan was without contrast I think we should re-image her including the chest  I will arrange for this  I will continue her on her chemotherapy  I'll see her back in 3-4 weeks  She will have a CAT scan done before then  Goals and Barriers:  Current Goal:  Prolong Survival from Pancreatic cancer  Barriers: None  Patient's Capacity to Self Care:  Patient able to self care  Portions of the record may have been created with voice recognition software   Occasional wrong word or "sound a like" substitutions may have occurred due to the inherent limitations of voice recognition software   Read the chart carefully and recognize, using context, where substitutions have occurred

## 2018-06-26 ENCOUNTER — TELEPHONE (OUTPATIENT)
Dept: HEMATOLOGY ONCOLOGY | Facility: CLINIC | Age: 63
End: 2018-06-26

## 2018-06-26 ENCOUNTER — OFFICE VISIT (OUTPATIENT)
Dept: PALLIATIVE MEDICINE | Facility: HOSPITAL | Age: 63
End: 2018-06-26

## 2018-06-26 ENCOUNTER — OFFICE VISIT (OUTPATIENT)
Dept: PALLIATIVE MEDICINE | Facility: HOSPITAL | Age: 63
End: 2018-06-26
Payer: COMMERCIAL

## 2018-06-26 VITALS
RESPIRATION RATE: 10 BRPM | OXYGEN SATURATION: 98 % | WEIGHT: 101 LBS | TEMPERATURE: 97.8 F | DIASTOLIC BLOOD PRESSURE: 70 MMHG | SYSTOLIC BLOOD PRESSURE: 134 MMHG | HEART RATE: 87 BPM | BODY MASS INDEX: 18.58 KG/M2 | HEIGHT: 62 IN

## 2018-06-26 DIAGNOSIS — F43.0 ANXIETY AS ACUTE REACTION TO EXCEPTIONAL STRESS: Primary | ICD-10-CM

## 2018-06-26 DIAGNOSIS — C25.2 MALIGNANT NEOPLASM OF TAIL OF PANCREAS (HCC): Primary | ICD-10-CM

## 2018-06-26 DIAGNOSIS — G89.3 CANCER RELATED PAIN: ICD-10-CM

## 2018-06-26 DIAGNOSIS — F41.8 ANXIETY ABOUT HEALTH: ICD-10-CM

## 2018-06-26 DIAGNOSIS — F41.1 ANXIETY AS ACUTE REACTION TO EXCEPTIONAL STRESS: Primary | ICD-10-CM

## 2018-06-26 PROCEDURE — 99215 OFFICE O/P EST HI 40 MIN: CPT | Performed by: FAMILY MEDICINE

## 2018-06-26 PROCEDURE — 99499 UNLISTED E&M SERVICE: CPT

## 2018-06-26 RX ORDER — OXYCODONE HYDROCHLORIDE 5 MG/1
TABLET ORAL
Qty: 100 TABLET | Refills: 0 | Status: SHIPPED | OUTPATIENT
Start: 2018-06-26

## 2018-06-26 NOTE — PROGRESS NOTES
Palliative LSW asked to see Bruno Cruz today after her appointment with Dr Eligio Espinosa  She was very upset and tearful regarding a lab value that was given to her by oncology  Her oncologist would like her to have a CT scan which is scheduled for tomorrow  Bruno Cruz was highly anxious and stating "I must have cancer somewhere else, I don't think I can keep doing this anymore "  She was able to be re-directed and de-escalated with support and empathetic listening  Bruno Cruz stated she is very lonely and misses her  Marvin Stallworth  She found the cancer support group to be beneficial and found 2 ladies to be friendly  Discussed keeping good boundaries and not getting "sucked in" to other people as Bruno Cruz admits she has done in the past   A consistent statement Bruno Cruz makes is being able to take a vacation or go somewhere for a few days to have "fun"  Encouraged her to think about a day trip or to take a few days to go someplace that would give her some escape  Also discussed bus trips that may help ease her anxiety about driving  Maria Alejandra spoke with Social Security and now has a   She was told she can collect off [employer] benefits and the application for herself is being processed  Maria Alejandra expressed her distress with having to continue to work  Last week she had to climb stacked tables to reach for something when she hurt her arm  She stated if her CT scan shows additional cancer, she will likely quit her job  The money she owes her employer for past health benefits coverage amounts to a little over $[phone]  Questioned her if she can pay this off with her 401K that she had been tapping into and she said yes, however she is hesitant to use it  Pt  Stated she felt better after talking and was ready to go home  Dr Eligio Espinosa requested 1 week follow up  Bruno Cruz stated she finds her cancer care counselor (Kayla Seo ) and [facility] team helpful to her  Will continue to follow

## 2018-06-26 NOTE — PROGRESS NOTES
Palliative LSW asked to see Nicolette Reed today after her appointment with Dr Kriss Hernandez  She was very upset and tearful regarding a lab value that was given to her by oncology  Her oncologist would like her to have a CT scan which is scheduled for tomorrow  Nicolette Reed was highly anxious and stating "I must have cancer somewhere else, I don't think I can keep doing this anymore "  She was able to be re-directed and de-escalated with support and empathetic listening  Nicolette Reed stated she is very lonely and misses her  Bre Block  She found the cancer support group to be beneficial and found 2 ladies to be friendly  Discussed keeping good boundaries and not getting "sucked in" to other people as Nicolette Reed admits she has done in the past   A consistent statement Nicolette Reed makes is being able to take a vacation or go somewhere for a few days to have "fun"  Encouraged her to think about a day trip or to take a few days to go someplace that would give her some escape  Also discussed bus trips that may help ease her anxiety about driving  Maria Alejandra spoke with Social Security and now has a   She was told she can collect off [employer] benefits and the application for herself is being processed  Maria Alejandra expressed her distress with having to continue to work  Last week she had to climb stacked tables to reach for something when she hurt her arm  She stated if her CT scan shows additional cancer, she will likely quit her job  The money she owes her employer for past health benefits coverage amounts to a little over $900 00  Questioned her if she can pay this off with her 401K that she had been tapping into and she said yes, however she is hesitant to use it  Pt  Stated she felt better after talking and was ready to go home  Dr Kriss Hernanedz requested 1 week follow up  Nicolette Reed stated she finds her cancer care counselor (Juhi Roland ) and [facility] team helpful to her  Will continue to follow

## 2018-06-26 NOTE — TELEPHONE ENCOUNTER
Called and spoke to pt's daughter  She was informed that pt's CA 19-9 was elevated and the scan was ordered to make sure everything is good  She understood

## 2018-06-26 NOTE — PROGRESS NOTES
Assessment/Plan:    No problem-specific Assessment & Plan notes found for this encounter  Diagnoses and all orders for this visit:    Malignant neoplasm of tail of pancreas ([address] )    Anxiety about health    Cancer related pain  -     oxyCODONE (ROXICODONE) 5 mg immediate release tablet; 1 tab every 4 hours as needed for pain and 2 tabs at bedtime        We discussed multiple options for pain control, balancing relief vs sedation  She will use ibuprofen 800 mg at breakfast and lunch and 10 mg of oxycodone at bedtime  She will increase her night time dose of Depakote to 250 mg  She will return next week after her CT  We may need to consider methadone  Subjective:      Patient ID: Ganesh Alvarado is a 65 y o  female  Lita Hemphill is seen in outpatient follow-up  PDMP query shows no concerns  She is very fragile and scared after receiving phone call from her oncology office indicating abnormal labs and referring her for a CT  She feels overwhelmed and uncertain about what this means  Reviewing her labs shows that her CA 19-9 is 80 which is elevated though her previous level was 98 and the rest of her lab work is reasonably normal so it is not clear that there has been a sudden change (though the continued elevated tumor marker raises the concern for persistent and possibly progressive disease)  She also feels that her pain has increased and she is having trouble getting through a day at work  She is hesitant to use opiates especially during the day  She has gotten some relief with ibuprofen which she takes intermittently  She is open to being more proactive with her pain management  The following portions of the patient's history were reviewed and updated as appropriate: allergies, current medications, past medical history, past social history, past surgical history and problem list     Review of Systems   Constitutional: Positive for activity change, appetite change and fatigue   Negative for chills, diaphoresis, fever and unexpected weight change  HENT: Negative  Eyes: Negative  Respiratory: Negative  Cardiovascular: Negative  Gastrointestinal: Positive for abdominal pain  Endocrine: Negative  Genitourinary: Negative  Musculoskeletal: Positive for back pain  Skin: Negative  Allergic/Immunologic: Negative  Neurological: Negative  Hematological: Negative  Psychiatric/Behavioral: Positive for dysphoric mood  The patient is nervous/anxious  Objective:      /70 (BP Location: Left arm, Patient Position: Sitting, Cuff Size: Standard)   Pulse 87   Temp 97 8 °F (36 6 °C) (Oral)   Resp (!) 10   Ht 5' 2" (1 575 m)   Wt 45 8 kg (101 lb)   LMP  (LMP Unknown)   SpO2 98%   BMI 18 47 kg/m²          Physical Exam   Constitutional: She is oriented to person, place, and time  She appears well-developed and well-nourished  She appears distressed  HENT:   Head: Normocephalic and atraumatic  Right Ear: External ear normal    Left Ear: External ear normal    Nose: Nose normal    Mouth/Throat: Oropharynx is clear and moist  No oropharyngeal exudate  Eyes: Conjunctivae and EOM are normal  Pupils are equal, round, and reactive to light  Right eye exhibits no discharge  Left eye exhibits no discharge  No scleral icterus  Neck: Normal range of motion  Neck supple  No JVD present  No tracheal deviation present  No thyromegaly present  Cardiovascular: Normal rate, regular rhythm and intact distal pulses  Exam reveals friction rub  Exam reveals no gallop  No murmur heard  Pulmonary/Chest: Breath sounds normal  No stridor  Abdominal: Soft  She exhibits no distension  There is tenderness  There is guarding  There is no rebound  Musculoskeletal: Normal range of motion  She exhibits no edema, tenderness or deformity  Lymphadenopathy:     She has no cervical adenopathy  Neurological: She is oriented to person, place, and time  She displays normal reflexes   No cranial nerve deficit  She exhibits normal muscle tone  Coordination normal    Skin: Skin is warm and dry  No rash noted  She is not diaphoretic  No erythema  No pallor  Psychiatric: Thought content normal  Her mood appears anxious  Her speech is rapid and/or pressured  She is agitated  Cognition and memory are normal  She expresses impulsivity  She exhibits a depressed mood  Vitals reviewed

## 2018-06-26 NOTE — TELEPHONE ENCOUNTER
See phone note from yesterday  Daughter calling to find out why a CT is ordered  Her mother did not understand what is abnormal in her lab work  Please call

## 2018-06-27 ENCOUNTER — HOSPITAL ENCOUNTER (OUTPATIENT)
Dept: CT IMAGING | Facility: HOSPITAL | Age: 63
Discharge: HOME | End: 2018-06-27
Attending: INTERNAL MEDICINE

## 2018-06-27 DIAGNOSIS — C25.2 MALIGNANT NEOPLASM OF TAIL OF PANCREAS (HCC): ICD-10-CM

## 2018-06-27 RX ORDER — ACETAMINOPHEN 325 MG/1
650 TABLET ORAL ONCE
Status: COMPLETED | OUTPATIENT
Start: 2018-06-29 | End: 2018-06-29

## 2018-06-27 NOTE — TELEPHONE ENCOUNTER
I returned phone call to patients daughter, ca 19-9 was elevated she will go for her CT today  They are aware

## 2018-06-28 ENCOUNTER — TRANSCRIBE ORDERS (OUTPATIENT)
Dept: RADIOLOGY | Facility: HOSPITAL | Age: 63
End: 2018-06-28

## 2018-06-28 ENCOUNTER — HOSPITAL ENCOUNTER (OUTPATIENT)
Dept: RADIOLOGY | Facility: HOSPITAL | Age: 63
Discharge: HOME | End: 2018-06-28
Attending: INTERNAL MEDICINE
Payer: COMMERCIAL

## 2018-06-28 PROCEDURE — 74177 CT ABD & PELVIS W/CONTRAST: CPT

## 2018-06-28 PROCEDURE — 71260 CT THORAX DX C+: CPT

## 2018-06-28 RX ADMIN — IOHEXOL 100 ML: 350 INJECTION, SOLUTION INTRAVENOUS at 14:17

## 2018-06-28 NOTE — PATIENT INSTRUCTIONS
Use ibuprofen 800 mg twice a day with food  Use oxycodone 2 tabs at bedtime increase Depakote to 2 tabs at bedtime and 1 tab in am  Return 1 week

## 2018-06-28 NOTE — PROGRESS NOTES
Assessment/Plan:    No problem-specific Assessment & Plan notes found for this encounter  Diagnoses and all orders for this visit:    Malignant neoplasm of tail of pancreas ([address] )    Anxiety about health    Cancer related pain  -     oxyCODONE (ROXICODONE) 5 mg immediate release tablet; 1 tab every 4 hours as needed for pain and 2 tabs at bedtime        We discussed multiple options for pain control, balancing relief vs sedation  She will use ibuprofen 800 mg at breakfast and lunch and 10 mg of oxycodone at bedtime  She will increase her night time dose of Depakote to 250 mg  She will return next week after her CT  We may need to consider methadone  Subjective:      Patient ID: Miriam Salmon is a 65 y o  female  Popeye Davies is seen in outpatient follow-up  PDMP query shows no concerns  She is very fragile and scared after receiving phone call from her oncology office indicating abnormal labs and referring her for a CT  She feels overwhelmed and uncertain about what this means  Reviewing her labs shows that her CA 19-9 is 80 which is elevated though her previous level was 98 and the rest of her lab work is reasonably normal so it is not clear that there has been a sudden change (though the continued elevated tumor marker raises the concern for persistent and possibly progressive disease)  She also feels that her pain has increased and she is having trouble getting through a day at work  She is hesitant to use opiates especially during the day  She has gotten some relief with ibuprofen which she takes intermittently  She is open to being more proactive with her pain management  The following portions of the patient's history were reviewed and updated as appropriate: allergies, current medications, past medical history, past social history, past surgical history and problem list     Review of Systems   Constitutional: Positive for activity change, appetite change and fatigue   Negative for chills, diaphoresis, fever and unexpected weight change  HENT: Negative  Eyes: Negative  Respiratory: Negative  Cardiovascular: Negative  Gastrointestinal: Positive for abdominal pain  Endocrine: Negative  Genitourinary: Negative  Musculoskeletal: Positive for back pain  Skin: Negative  Allergic/Immunologic: Negative  Neurological: Negative  Hematological: Negative  Psychiatric/Behavioral: Positive for dysphoric mood  The patient is nervous/anxious  Objective:      /70 (BP Location: Left arm, Patient Position: Sitting, Cuff Size: Standard)   Pulse 87   Temp 97 8 °F (36 6 °C) (Oral)   Resp (!) 10   Ht 5' 2" (1 575 m)   Wt 45 8 kg (101 lb)   LMP  (LMP Unknown)   SpO2 98%   BMI 18 47 kg/m²          Physical Exam   Constitutional: She is oriented to person, place, and time  She appears well-developed and well-nourished  She appears distressed  HENT:   Head: Normocephalic and atraumatic  Right Ear: External ear normal    Left Ear: External ear normal    Nose: Nose normal    Mouth/Throat: Oropharynx is clear and moist  No oropharyngeal exudate  Eyes: Conjunctivae and EOM are normal  Pupils are equal, round, and reactive to light  Right eye exhibits no discharge  Left eye exhibits no discharge  No scleral icterus  Neck: Normal range of motion  Neck supple  No JVD present  No tracheal deviation present  No thyromegaly present  Cardiovascular: Normal rate, regular rhythm and intact distal pulses  Exam reveals friction rub  Exam reveals no gallop  No murmur heard  Pulmonary/Chest: Breath sounds normal  No stridor  Abdominal: Soft  She exhibits no distension  There is tenderness  There is guarding  There is no rebound  Musculoskeletal: Normal range of motion  She exhibits no edema, tenderness or deformity  Lymphadenopathy:     She has no cervical adenopathy  Neurological: She is oriented to person, place, and time  She displays normal reflexes   No cranial nerve deficit  She exhibits normal muscle tone  Coordination normal    Skin: Skin is warm and dry  No rash noted  She is not diaphoretic  No erythema  No pallor  Psychiatric: Thought content normal  Her mood appears anxious  Her speech is rapid and/or pressured  She is agitated  Cognition and memory are normal  She expresses impulsivity  She exhibits a depressed mood  Vitals reviewed

## 2018-06-29 ENCOUNTER — HOSPITAL ENCOUNTER (OUTPATIENT)
Dept: INFUSION CENTER | Facility: HOSPITAL | Age: 63
Discharge: HOME | End: 2018-06-29
Payer: COMMERCIAL

## 2018-06-29 ENCOUNTER — APPOINTMENT (OUTPATIENT)
Dept: LAB | Facility: HOSPITAL | Age: 63
End: 2018-06-29
Attending: INTERNAL MEDICINE
Payer: COMMERCIAL

## 2018-06-29 VITALS
TEMPERATURE: 98.5 F | OXYGEN SATURATION: 98 % | HEART RATE: 93 BPM | HEIGHT: 62 IN | DIASTOLIC BLOOD PRESSURE: 61 MMHG | SYSTOLIC BLOOD PRESSURE: 103 MMHG | WEIGHT: 106.26 LBS | BODY MASS INDEX: 19.55 KG/M2

## 2018-06-29 PROCEDURE — 96413 CHEMO IV INFUSION 1 HR: CPT

## 2018-06-29 PROCEDURE — 96367 TX/PROPH/DG ADDL SEQ IV INF: CPT

## 2018-06-29 RX ADMIN — ACETAMINOPHEN 650 MG: 325 TABLET, FILM COATED ORAL at 14:03

## 2018-06-29 RX ADMIN — Medication 300 UNITS: at 15:25

## 2018-06-29 RX ADMIN — DEXAMETHASONE SODIUM PHOSPHATE: 10 INJECTION, SOLUTION INTRAMUSCULAR; INTRAVENOUS at 14:01

## 2018-06-29 RX ADMIN — GEMCITABINE 1400 MG: 38 INJECTION, SOLUTION INTRAVENOUS at 14:34

## 2018-06-29 RX ADMIN — SODIUM CHLORIDE 20 ML/HR: 9 INJECTION, SOLUTION INTRAVENOUS at 14:00

## 2018-06-29 NOTE — SOCIAL WORK
Met with pt during treatment today  Pt was anxious re: CT scan report but encouraged after she received results  She continues with [employer] Disability application and has an assigned  processing the [employer]  Awaiting determination from [employer] re: outstanding balances on copays  Discussed methods of relaxation and self care, and also recent issues with bereavement  MSW will continue to follow to assist with resources and provide emotional support

## 2018-06-29 NOTE — PROGRESS NOTES
Pt arrived amb for chemo  Has lots of questions about her CT results and her oral Xeloda refills from [place]  REferred to Marguerite Gannon RN  Port accessed [employer], NSS to kvo, premeds infused, [facility] infusing    WCTM

## 2018-06-29 NOTE — PROGRESS NOTES
CT results sent via fax to give to pt and reviewed  Port deaccessed after flushing with Heparin  Dsd applied  Disch amb to home, steady gait  Pt to call Madina Tam RN when she gets home to clear up her Xeloda dose and prescription questions and where it has been coming form

## 2018-07-03 ENCOUNTER — OFFICE VISIT (OUTPATIENT)
Dept: PALLIATIVE MEDICINE | Facility: HOSPITAL | Age: 63
End: 2018-07-03
Payer: COMMERCIAL

## 2018-07-03 VITALS
HEIGHT: 62 IN | WEIGHT: 104 LBS | TEMPERATURE: 98.1 F | HEART RATE: 87 BPM | OXYGEN SATURATION: 99 % | BODY MASS INDEX: 19.14 KG/M2 | RESPIRATION RATE: 18 BRPM | DIASTOLIC BLOOD PRESSURE: 52 MMHG | SYSTOLIC BLOOD PRESSURE: 102 MMHG

## 2018-07-03 DIAGNOSIS — F32.9 REACTIVE DEPRESSION: ICD-10-CM

## 2018-07-03 DIAGNOSIS — F41.8 ANXIETY ABOUT HEALTH: ICD-10-CM

## 2018-07-03 DIAGNOSIS — M26.609 TMJ (TEMPOROMANDIBULAR JOINT SYNDROME): ICD-10-CM

## 2018-07-03 DIAGNOSIS — C25.2 MALIGNANT NEOPLASM OF TAIL OF PANCREAS (HCC): Primary | ICD-10-CM

## 2018-07-03 PROCEDURE — 99214 OFFICE O/P EST MOD 30 MIN: CPT | Performed by: FAMILY MEDICINE

## 2018-07-03 RX ORDER — DIVALPROEX SODIUM 125 MG/1
TABLET, DELAYED RELEASE ORAL
Qty: 90 TABLET | Refills: 3 | Status: SHIPPED | OUTPATIENT
Start: 2018-07-03 | End: 2018-10-16 | Stop reason: SDUPTHER

## 2018-07-03 NOTE — PROGRESS NOTES
Assessment/Plan:    No problem-specific Assessment & Plan notes found for this encounter  Diagnoses and all orders for this visit:    Malignant neoplasm of tail of pancreas ([address] )    Anxiety about health  -     divalproex sodium (DEPAKOTE) 125 mg EC tablet; 1 tab in am and 2 tabs at bedtime    Reactive depression  -     divalproex sodium (DEPAKOTE) 125 mg EC tablet; 1 tab in am and 2 tabs at bedtime    TMJ (temporomandibular joint syndrome)  -     divalproex sodium (DEPAKOTE) 125 mg EC tablet; 1 tab in am and 2 tabs at bedtime        Support provided  She will increase her depakote to 125/250  No other changes needed at this time  Acupuncture will continue to be provided on an as needed basis  Subjective:      Patient ID: Bola Haro is a 65 y o  female  Michael Parlor returns in outpatient follow-up  PDMP query shows no concerns  She indicates that she did not fill the script for oxycodone and has not felt that she needed it at this time  She also did not increase her Depakote  We reviewed her CT scan which shows no evidence of recurrent or metastatic disease  There is one small area of hypodensity in the liver that is too small to characterize  Her CA 19-9 has further reduced to 77  She will continue her current cancer treatment regimen  She is seeking some spiritual counseling  Her pain is localized to her incision  The following portions of the patient's history were reviewed and updated as appropriate: allergies, current medications, past family history, past medical history, past social history, past surgical history and problem list     Review of Systems   Constitutional: Positive for fatigue  Negative for activity change, appetite change, chills, diaphoresis, fever and unexpected weight change  HENT: Negative  Eyes: Negative  Respiratory: Negative  Cardiovascular: Negative  Gastrointestinal: Positive for abdominal pain   Negative for abdominal distention, anal bleeding, blood in stool, constipation, diarrhea, nausea, rectal pain and vomiting  Endocrine: Negative  Genitourinary: Negative  Musculoskeletal: Negative  Skin: Negative  Allergic/Immunologic: Negative  Neurological: Negative  Hematological: Negative  Psychiatric/Behavioral: Positive for dysphoric mood  The patient is nervous/anxious  Objective:      /52 (BP Location: Right arm, Patient Position: Sitting, Cuff Size: Standard)   Pulse 87   Temp 98 1 °F (36 7 °C) (Oral)   Resp 18   Ht 5' 2" (1 575 m)   Wt 47 2 kg (104 lb)   LMP  (LMP Unknown)   SpO2 99%   BMI 19 02 kg/m²          Physical Exam   Constitutional: She is oriented to person, place, and time  She appears well-developed and well-nourished  No distress  HENT:   Head: Normocephalic and atraumatic  Right Ear: External ear normal    Left Ear: External ear normal    Nose: Nose normal    Mouth/Throat: Oropharynx is clear and moist    Eyes: Conjunctivae and EOM are normal  Pupils are equal, round, and reactive to light  Right eye exhibits no discharge  Left eye exhibits no discharge  No scleral icterus  Neck: Normal range of motion  Neck supple  No JVD present  No tracheal deviation present  No thyromegaly present  Cardiovascular: Normal rate and regular rhythm  Pulmonary/Chest: Effort normal and breath sounds normal  No stridor  Abdominal: Soft  Normal appearance and bowel sounds are normal  She exhibits no shifting dullness, no distension, no pulsatile liver, no fluid wave, no abdominal bruit, no ascites, no pulsatile midline mass and no mass  There is no hepatosplenomegaly, splenomegaly or hepatomegaly  There is tenderness in the right upper quadrant  There is no rigidity, no rebound, no guarding, no CVA tenderness, no tenderness at McBurney's point and negative Eden's sign  No hernia   Hernia confirmed negative in the ventral area, confirmed negative in the right inguinal area and confirmed negative in the left inguinal area        Musculoskeletal: Normal range of motion  She exhibits no edema, tenderness or deformity  Lymphadenopathy:     She has no cervical adenopathy  Neurological: She is alert and oriented to person, place, and time  She has normal reflexes  She displays normal reflexes  No cranial nerve deficit  She exhibits normal muscle tone  Coordination normal    Skin: Skin is warm and dry  No rash noted  She is not diaphoretic  No erythema  No pallor  Psychiatric: Judgment and thought content normal  Her mood appears anxious  Her speech is rapid and/or pressured  She is agitated  Cognition and memory are normal    Vitals reviewed

## 2018-07-10 RX ORDER — SODIUM CHLORIDE 9 MG/ML
20 INJECTION, SOLUTION INTRAVENOUS ONCE
Status: COMPLETED | OUTPATIENT
Start: 2018-07-13 | End: 2018-07-13

## 2018-07-11 RX ORDER — SODIUM CHLORIDE 9 MG/ML
20 INJECTION, SOLUTION INTRAVENOUS ONCE
Status: COMPLETED | OUTPATIENT
Start: 2018-07-27 | End: 2018-07-27

## 2018-07-11 RX ORDER — ACETAMINOPHEN 325 MG/1
650 TABLET ORAL ONCE
Status: COMPLETED | OUTPATIENT
Start: 2018-07-13 | End: 2018-07-13

## 2018-07-11 RX ORDER — SODIUM CHLORIDE 9 MG/ML
20 INJECTION, SOLUTION INTRAVENOUS ONCE
Status: COMPLETED | OUTPATIENT
Start: 2018-07-20 | End: 2018-07-20

## 2018-07-12 ENCOUNTER — APPOINTMENT (OUTPATIENT)
Dept: LAB | Facility: HOSPITAL | Age: 63
End: 2018-07-12
Attending: INTERNAL MEDICINE
Payer: COMMERCIAL

## 2018-07-13 ENCOUNTER — DOCUMENTATION (OUTPATIENT)
Dept: NUTRITION | Facility: HOSPITAL | Age: 63
End: 2018-07-13

## 2018-07-13 ENCOUNTER — HOSPITAL ENCOUNTER (OUTPATIENT)
Dept: INFUSION CENTER | Facility: HOSPITAL | Age: 63
Discharge: HOME | End: 2018-07-13
Payer: COMMERCIAL

## 2018-07-13 VITALS
WEIGHT: 106.92 LBS | RESPIRATION RATE: 18 BRPM | BODY MASS INDEX: 18.95 KG/M2 | SYSTOLIC BLOOD PRESSURE: 97 MMHG | HEIGHT: 63 IN | TEMPERATURE: 97.5 F | HEART RATE: 83 BPM | DIASTOLIC BLOOD PRESSURE: 46 MMHG

## 2018-07-13 DIAGNOSIS — C25.2 MALIGNANT NEOPLASM OF TAIL OF PANCREAS (HCC): ICD-10-CM

## 2018-07-13 LAB
BASOPHILS # BLD MANUAL: 0 THOUSAND/UL (ref 0–0.1)
BASOPHILS NFR MAR MANUAL: 0 % (ref 0–1)
EOSINOPHIL # BLD MANUAL: 0.09 THOUSAND/UL (ref 0–0.4)
EOSINOPHIL NFR BLD MANUAL: 1 % (ref 0–6)
ERYTHROCYTE [DISTWIDTH] IN BLOOD BY AUTOMATED COUNT: 23.2 % (ref 11.6–15.1)
HCT VFR BLD AUTO: 25.8 % (ref 34.8–46.1)
HGB BLD-MCNC: 8.6 G/DL (ref 11.5–15.4)
HOWELL-JOLLY BOD BLD QL SMEAR: PRESENT
LG PLATELETS BLD QL SMEAR: PRESENT
LYMPHOCYTES # BLD AUTO: 2.39 THOUSAND/UL (ref 0.6–4.47)
LYMPHOCYTES # BLD AUTO: 28 % (ref 14–44)
MACROCYTES BLD QL AUTO: PRESENT
MCH RBC QN AUTO: 39.3 PG (ref 26.8–34.3)
MCHC RBC AUTO-ENTMCNC: 33.3 G/DL (ref 31.4–37.4)
MCV RBC AUTO: 118 FL (ref 82–98)
MONOCYTES # BLD AUTO: 1.36 THOUSAND/UL (ref 0–1.22)
MONOCYTES NFR BLD: 16 % (ref 4–12)
MYELOCYTES NFR BLD MANUAL: 1 % (ref 0–1)
NEUTROPHILS # BLD MANUAL: 4.61 THOUSAND/UL (ref 1.85–7.62)
NEUTS BAND NFR BLD MANUAL: 3 % (ref 0–8)
NEUTS SEG NFR BLD AUTO: 51 % (ref 43–75)
NRBC BLD AUTO-RTO: 1 /100 WBCS
PLATELET # BLD AUTO: 662 THOUSANDS/UL (ref 149–390)
PLATELET BLD QL SMEAR: ABNORMAL
PMV BLD AUTO: 9.6 FL (ref 8.9–12.7)
POLYCHROMASIA BLD QL SMEAR: PRESENT
RBC # BLD AUTO: 2.19 MILLION/UL (ref 3.81–5.12)
RBC MORPH BLD: PRESENT
TOTAL CELLS COUNTED SPEC: 100
WBC # BLD AUTO: 8.53 THOUSAND/UL (ref 4.31–10.16)

## 2018-07-13 PROCEDURE — 85027 COMPLETE CBC AUTOMATED: CPT

## 2018-07-13 PROCEDURE — 96413 CHEMO IV INFUSION 1 HR: CPT

## 2018-07-13 PROCEDURE — 96367 TX/PROPH/DG ADDL SEQ IV INF: CPT

## 2018-07-13 PROCEDURE — 85007 BL SMEAR W/DIFF WBC COUNT: CPT

## 2018-07-13 RX ADMIN — GEMCITABINE 1400 MG: 38 INJECTION, SOLUTION INTRAVENOUS at 11:36

## 2018-07-13 RX ADMIN — ACETAMINOPHEN 650 MG: 325 TABLET, FILM COATED ORAL at 11:10

## 2018-07-13 RX ADMIN — SODIUM CHLORIDE 20 ML/HR: 0.9 INJECTION, SOLUTION INTRAVENOUS at 11:00

## 2018-07-13 RX ADMIN — ONDANSETRON HYDROCHLORIDE: 2 INJECTION, SOLUTION INTRAMUSCULAR; INTRAVENOUS at 11:00

## 2018-07-13 RX ADMIN — Medication 300 UNITS: at 12:08

## 2018-07-13 NOTE — PROGRESS NOTES
Pt screened for nutrition risk, states she has lost 20# x 1 year, nothing recently though, she has currently been stable x3-4 months now  She typically eats 2 meals a day and that has not changed, she does not snack much  She has currently been in the 101-105# range and would like to gain to about 110-115#  Discussed appropriate ways to gain weight with pt, she has previously received eating with cancer book  All questions answered at this time

## 2018-07-17 ENCOUNTER — OFFICE VISIT (OUTPATIENT)
Dept: PALLIATIVE MEDICINE | Facility: HOSPITAL | Age: 63
End: 2018-07-17
Payer: COMMERCIAL

## 2018-07-17 ENCOUNTER — OFFICE VISIT (OUTPATIENT)
Dept: PALLIATIVE MEDICINE | Facility: HOSPITAL | Age: 63
End: 2018-07-17

## 2018-07-17 VITALS
HEART RATE: 92 BPM | DIASTOLIC BLOOD PRESSURE: 60 MMHG | WEIGHT: 106 LBS | RESPIRATION RATE: 18 BRPM | HEIGHT: 62 IN | OXYGEN SATURATION: 96 % | SYSTOLIC BLOOD PRESSURE: 104 MMHG | TEMPERATURE: 97.5 F | BODY MASS INDEX: 19.51 KG/M2

## 2018-07-17 DIAGNOSIS — M19.011 ARTHRITIS OF RIGHT ACROMIOCLAVICULAR JOINT: ICD-10-CM

## 2018-07-17 DIAGNOSIS — Z71.89 COUNSELING AND COORDINATION OF CARE: Primary | ICD-10-CM

## 2018-07-17 DIAGNOSIS — F41.8 ANXIETY ABOUT HEALTH: ICD-10-CM

## 2018-07-17 DIAGNOSIS — C25.2 MALIGNANT NEOPLASM OF TAIL OF PANCREAS (HCC): Primary | ICD-10-CM

## 2018-07-17 DIAGNOSIS — F32.A DEPRESSION, UNSPECIFIED DEPRESSION TYPE: ICD-10-CM

## 2018-07-17 DIAGNOSIS — M54.41 CHRONIC BILATERAL LOW BACK PAIN WITH BILATERAL SCIATICA: ICD-10-CM

## 2018-07-17 DIAGNOSIS — G89.29 CHRONIC BILATERAL LOW BACK PAIN WITH BILATERAL SCIATICA: ICD-10-CM

## 2018-07-17 DIAGNOSIS — M54.42 CHRONIC BILATERAL LOW BACK PAIN WITH BILATERAL SCIATICA: ICD-10-CM

## 2018-07-17 PROCEDURE — 99213 OFFICE O/P EST LOW 20 MIN: CPT | Performed by: FAMILY MEDICINE

## 2018-07-17 PROCEDURE — 99499 UNLISTED E&M SERVICE: CPT

## 2018-07-17 RX ORDER — DIAZEPAM 5 MG/1
5 TABLET ORAL EVERY 12 HOURS PRN
Qty: 30 TABLET | Refills: 0 | Status: ON HOLD | OUTPATIENT
Start: 2018-07-17 | End: 2018-09-27 | Stop reason: SDUPTHER

## 2018-07-17 RX ORDER — IBUPROFEN 800 MG/1
800 TABLET ORAL EVERY 8 HOURS PRN
Qty: 90 TABLET | Refills: 3 | Status: ON HOLD | OUTPATIENT
Start: 2018-07-17 | End: 2018-09-27

## 2018-07-17 RX ORDER — FLUOXETINE HYDROCHLORIDE 40 MG/1
40 CAPSULE ORAL DAILY
Qty: 30 CAPSULE | Refills: 3 | Status: SHIPPED | OUTPATIENT
Start: 2018-07-17 | End: 2018-10-16 | Stop reason: SDUPTHER

## 2018-07-17 NOTE — PROGRESS NOTES
Pt  Presents for [facility] follow up today  She received acupuncture and medications refilled per Dr Alpesh Tam  LSW and LCSW (Iris Friend ) met with her for supportive counseling  Tena Vizcarra was in brighter spirits today, and states she has been feeling more "at peace" lately  She started seeing a  in [facility] who has experience with SSD/SSI and offered to assist pt  With continuing her disability process  She identified struggling with cutting her work hours which would make her ineligible for health insurance  If she files for disability, there again she faces a lapse in health insurance  Discussed her fears related to this and assured her the care she is receiving now will not change  She will incur bills which could be submitted for financial assistance through oncology/[facility] and possible grants  Discussed exploring re-prioritizing her goals and focusing more on her health care and cancer treatment as opposed to her work responsibility  After long discussion, Tena Vizcarra stated "I think I am going to look into filing for disability  Maria Alejandra attended the oncology women's support group this past month  Discussed the grief and loss group held at the same time at [address] [place] [address]  She will try to attend that one as well  Tena Vizcarra is scheduled for f/u in 2 weeks

## 2018-07-17 NOTE — PROGRESS NOTES
Assessment/Plan:    No problem-specific Assessment & Plan notes found for this encounter  Diagnoses and all orders for this visit:    Malignant neoplasm of tail of pancreas ([facility])  -     diazepam (VALIUM) 5 mg tablet; Take 1 tablet (5 mg total) by mouth every 12 (twelve) hours as needed for anxiety  -     pancrelipase, Lip-Prot-Amyl, (CREON) 12,000 units capsule; Take 12,000 units of lipase by mouth 3 (three) times a day with meals for 180 doses    Anxiety about health    Chronic bilateral low back pain with bilateral sciatica    Arthritis of right acromioclavicular joint  -     ibuprofen (MOTRIN) 800 mg tablet; Take 1 tablet (800 mg total) by mouth every 8 (eight) hours as needed for moderate pain    Depression, unspecified depression type  -     FLUoxetine (PROzac) 40 MG capsule; Take 1 capsule (40 mg total) by mouth daily        No changes  Continue current regimen  Subjective:      Patient ID: Galilea Rendon is a 65 y o  female  Leonidas Mejia returns in outpatient follow-up  PDMP query shows no concerns  Repeat CA 19-9 continues to drop, most recently at 77  She is taking her valproic acid 125/250 and continues to find it helpful  No new issues or events  She is doing some herbal supplements as well  The following portions of the patient's history were reviewed and updated as appropriate: allergies, current medications, past medical history, past social history, past surgical history and problem list     Review of Systems   Constitutional: Positive for activity change and fatigue  Negative for fever and unexpected weight change  HENT: Negative  Eyes: Negative  Respiratory: Negative  Cardiovascular: Negative  Gastrointestinal: Positive for abdominal pain  Negative for abdominal distention, anal bleeding, blood in stool, constipation, diarrhea, nausea, rectal pain and vomiting  Endocrine: Negative  Genitourinary: Negative  Musculoskeletal: Negative  Skin: Negative  Allergic/Immunologic: Negative  Neurological: Negative  Hematological: Negative  Psychiatric/Behavioral: The patient is nervous/anxious  Objective:      /60 (BP Location: Right arm, Patient Position: Sitting, Cuff Size: Standard)   Pulse 92   Temp 97 5 °F (36 4 °C) (Oral)   Resp 18   Ht 5' 2" (1 575 m)   Wt 48 1 kg (106 lb)   LMP  (LMP Unknown)   SpO2 96%   BMI 19 39 kg/m²          Physical Exam   Constitutional: She is oriented to person, place, and time  She appears well-developed and well-nourished  No distress  HENT:   Head: Normocephalic and atraumatic  Right Ear: External ear normal    Left Ear: External ear normal    Nose: Nose normal    Mouth/Throat: Oropharynx is clear and moist  No oropharyngeal exudate  Eyes: Conjunctivae and EOM are normal  Pupils are equal, round, and reactive to light  Right eye exhibits no discharge  Left eye exhibits no discharge  No scleral icterus  Neck: Normal range of motion  Neck supple  No JVD present  No tracheal deviation present  No thyromegaly present  Cardiovascular: Normal rate, regular rhythm, normal heart sounds and intact distal pulses  Pulmonary/Chest: Effort normal and breath sounds normal  No stridor  Abdominal: Soft  Bowel sounds are normal  She exhibits no distension and no mass  There is no hepatosplenomegaly  There is tenderness in the right upper quadrant  There is no rebound, no guarding and no CVA tenderness  No hernia  Musculoskeletal:        Arms:       Feet:    Lymphadenopathy:     She has no cervical adenopathy  Neurological: She is alert and oriented to person, place, and time  She has normal reflexes  She displays normal reflexes  No cranial nerve deficit  She exhibits normal muscle tone  Coordination normal    Skin: Skin is warm and dry  No rash noted  She is not diaphoretic  No erythema  No pallor  Psychiatric: She has a normal mood and affect   Her behavior is normal  Judgment and thought content normal    Vitals reviewed

## 2018-07-18 RX ORDER — ACETAMINOPHEN 325 MG/1
650 TABLET ORAL ONCE
Status: COMPLETED | OUTPATIENT
Start: 2018-07-20 | End: 2018-07-20

## 2018-07-20 ENCOUNTER — APPOINTMENT (OUTPATIENT)
Dept: LAB | Facility: HOSPITAL | Age: 63
End: 2018-07-20
Attending: INTERNAL MEDICINE
Payer: COMMERCIAL

## 2018-07-20 ENCOUNTER — HOSPITAL ENCOUNTER (OUTPATIENT)
Dept: INFUSION CENTER | Facility: HOSPITAL | Age: 63
Discharge: HOME | End: 2018-07-20
Payer: COMMERCIAL

## 2018-07-20 VITALS
SYSTOLIC BLOOD PRESSURE: 122 MMHG | HEIGHT: 63 IN | TEMPERATURE: 98.8 F | HEART RATE: 78 BPM | RESPIRATION RATE: 16 BRPM | WEIGHT: 108.03 LBS | DIASTOLIC BLOOD PRESSURE: 66 MMHG | BODY MASS INDEX: 19.14 KG/M2 | OXYGEN SATURATION: 98 %

## 2018-07-20 PROCEDURE — 96367 TX/PROPH/DG ADDL SEQ IV INF: CPT

## 2018-07-20 PROCEDURE — 96413 CHEMO IV INFUSION 1 HR: CPT

## 2018-07-20 RX ADMIN — ACETAMINOPHEN 650 MG: 325 TABLET, FILM COATED ORAL at 10:20

## 2018-07-20 RX ADMIN — SODIUM CHLORIDE 20 ML/HR: 9 INJECTION, SOLUTION INTRAVENOUS at 10:17

## 2018-07-20 RX ADMIN — ONDANSETRON HYDROCHLORIDE: 2 INJECTION, SOLUTION INTRAMUSCULAR; INTRAVENOUS at 10:16

## 2018-07-20 RX ADMIN — GEMCITABINE 1400 MG: 38 INJECTION, SOLUTION INTRAVENOUS at 10:58

## 2018-07-20 RX ADMIN — Medication 300 UNITS: at 11:47

## 2018-07-20 NOTE — PROGRESS NOTES
Pt arrived amb for chemo, familiar with procedure  Port accessed, brisk blood return, NSS to kvo, premeds infusing

## 2018-07-20 NOTE — PROGRESS NOTES
Pt roseanne infusion well  Port deaccessed after flushing with Heparin  Dsd applied  Disch amb to home, steady gait

## 2018-07-25 ENCOUNTER — OFFICE VISIT (OUTPATIENT)
Dept: HEMATOLOGY ONCOLOGY | Facility: HOSPITAL | Age: 63
End: 2018-07-25
Payer: COMMERCIAL

## 2018-07-25 VITALS
TEMPERATURE: 98 F | OXYGEN SATURATION: 98 % | HEART RATE: 79 BPM | HEIGHT: 62 IN | DIASTOLIC BLOOD PRESSURE: 62 MMHG | RESPIRATION RATE: 16 BRPM | SYSTOLIC BLOOD PRESSURE: 108 MMHG | WEIGHT: 108 LBS | BODY MASS INDEX: 19.88 KG/M2

## 2018-07-25 DIAGNOSIS — C25.2 MALIGNANT NEOPLASM OF TAIL OF PANCREAS (HCC): Primary | ICD-10-CM

## 2018-07-25 PROCEDURE — 99214 OFFICE O/P EST MOD 30 MIN: CPT | Performed by: INTERNAL MEDICINE

## 2018-07-25 NOTE — PROGRESS NOTES
Hematology/Oncology Outpatient Follow- up Note  Kala Nails 65 y o  female MRN: @ Encounter: [id number]        Date:  7/25/2018    Presenting Complaint/Diagnosis : T2 N0 M0 pancreatic cancer status post resection    HPI:      The patient was  seen for initial consultation 5/4/2018 regarding A newly resected pancreatic cancer  She was seen here at [place] [address] by our colleagues in surgical oncology  They were planning a resection of a pancreatic mass which was biopsy-proven to be adenocarcinoma  The patient had no family or support system here in the [facility] area so she decided to go to [place] where she has family to get her resection  She had her resection done and was found to have a T2 N0 M0 malignancy  Since she had no lymph nodes involved and was decided to give her 6 months of adjuvant chemotherapy with gemcitabine and Xeloda  From what she tells me she has been on Xeloda 1500 mg twice a day and gemcitabine thousand milligrams per meter square day 1 and 8 and 15  Xeloda is 2 out of 4 weeks  The gemcitabine was 3 out of 4 weeks  She got one cycle and then moved back home to [facility] so wishes to establish care  Previous Hematologic/ Oncologic History:    Surgery    Current Hematologic/ Oncologic Treatment:      Gemcitabine and Xeloda  Xeloda is 1500 mg twice a day and gemcitabine thousand milligrams per meter square day 1 and 8 and 15  Xeloda is 2 out of 4 weeks  The gemcitabine was 3 out of 4 weeks  This is the exact regimen and doses she was on in [place] and she got one cycle there  She is in the midst of her third cycle here at [place] [address] and fourth cycle total  Cycle #5 total will start on 10 August     Interval History:    The patient returns for follow-up visit  Her CA-19-9 continues to fluctuate which is concerning but imaging has not shown any evidence of metastatic disease  Her most recent imaging here at [place] [address] 28 June which showed no convincing evidence of metastatic disease within the chest abdomen or pelvis  Her CA-19-9 was elevated at 77 at the time of that exam  In January and May of this year the CA-19-9 was normal but then went up  The most recent one is 103  Her function tests are normal CBC shows anemia with mild thrombocytosis  Options at this point include continuing the same regimen or switching her to gemcitabine and Abraxane or FOLFIRINOX  Again apart from the CA-19-9 is no other reason to switch her  We did have a discussion and she opted to stay on her current regimen which he is tolerating reasonably well  She does have fatigue from it  Also has occasional right upper quadrant pain but again there was no evidence of malignancy based on the radiology report  Otherwise denies any nausea or vomiting  Fatigue is remaining issue  The rest of her 14 point review of systems today was negative  Test Results:    Imaging: Ct Chest Abdomen Pelvis W Contrast    Result Date: 6/28/2018  Narrative: CT CHEST, ABDOMEN AND PELVIS WITH IV CONTRAST INDICATION:   C25 2: Malignant neoplasm of tail of pancreas  28-year-old woman with 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  She is currently on adjuvant chemotherapy  Restaging study  CA-19-9 is elevated at 85  COMPARISON: Abdomen pelvis CT 5/20/2018  Chest CT 1/26/2018 TECHNIQUE: CT examination of the chest, abdomen and pelvis was performed  Scanning through the abdomen was performed in arterial, venous and delayed phases according a protocol spefically designed to evaluate upper abdominal viscera  Axial, sagittal, and coronal 2D reformatted images were created from the source data and submitted for interpretation  Radiation dose length product (DLP) for this visit:  987 2 mGy-cm     This examination, like all CT scans performed in the [facility], was performed utilizing techniques to minimize radiation dose exposure, including the use of iterative reconstruction and automated exposure control  IV Contrast:  100 mL of iohexol (OMNIPAQUE) Enteric Contrast: Enteric contrast was administered  FINDINGS: CHEST LUNGS:  Severe upper lobe predominant emphysema is unchanged  No suspicious pulmonary nodules  Subpleural fibrosis is unchanged     There is no tracheal or endobronchial lesion  PLEURA:  Unremarkable  HEART/GREAT VESSELS:  Unremarkable for patient's age  MEDIASTINUM AND ROME:  Unremarkable  CHEST WALL AND LOWER NECK:   Right internal jugular port tip is in lower SVC  ABDOMEN Portions of the abdomen are obscured due to streak artifact from spinal fusion hardware and from metallic cholecystectomy clips  LIVER/BILIARY TREE:  0 6 cm subcentimeter hypodense lesion in segment 6 is too small to characterize, but statistically benign  This was not definitely seen on prior contrast-enhanced abdominal CT 1/5/2018 (series 4 image 69)  Liver is enlarged measuring 21 cm craniocaudal  Mild intra and extra hepatic biliary dilation is unchanged since 1/5/2018, likely within the range of normal status post cholecystectomy  GALLBLADDER:  Gallbladder is surgically absent  SPLEEN:  Absent  PANCREAS:  Status post distal pancreatectomy and splenectomy  No evidence of recurrence in the resection bed  ADRENAL GLANDS:  Unremarkable  KIDNEYS/URETERS:  Unremarkable  No hydronephrosis  STOMACH AND BOWEL:  Stomach is distended  No bowel obstruction  APPENDIX:  No findings to suggest appendicitis  ABDOMINOPELVIC CAVITY:  No ascites or free intraperitoneal air  No lymphadenopathy  VESSELS:  Atherosclerotic changes are present  No evidence of aneurysm  PELVIS REPRODUCTIVE ORGANS:  Unremarkable for patient's age  URINARY BLADDER:  Unremarkable  ABDOMINAL WALL/INGUINAL REGIONS:  Unremarkable  OSSEOUS STRUCTURES:  No acute fracture or destructive osseous lesion  Status post fusion L4-5     Impression: 1  Status post distal pancreatectomy and splenectomy  No evidence of local recurrence   2   Probably benign subcentimeter liver lesions as above  Attention on future follow-up studies is recommended  3   No convincing evidence of metastatic disease within the chest, abdomen or pelvis  4   Unchanged severe emphysema and areas of subpleural fibrosis  Workstation performed: [id number]       Labs:   Lab Results   Component Value Date    WBC 5 87 07/20/2018    HGB 9 3 (L) 07/20/2018    HCT 27 5 (L) 07/20/2018     (H) 07/20/2018     (H) 07/20/2018     Lab Results   Component Value Date     07/20/2018    K 4 7 07/20/2018     07/20/2018    CO2 33 (H) 07/20/2018    ANIONGAP 6 07/20/2018    BUN 16 07/20/2018    CREATININE 0 59 (L) 07/20/2018    GLUCOSE 95 07/20/2018    GLUF 93 06/29/2018    CALCIUM 8 4 07/20/2018    AST 18 07/20/2018    ALT 22 07/20/2018    ALKPHOS 74 07/20/2018    PROT 7 1 07/20/2018    BILITOT 0 20 07/20/2018    EGFR 99 07/20/2018         Lab Results   Component Value Date    TCOQSSHZ45 338 06/20/2016         ROS: As stated in the history of present illness otherwise his 14 point review of systems today was negative        Active Problems:   Patient Active Problem List   Diagnosis    Malignant [facility] tail [facility] [address] [facility]    Psychiatric disorder    Malignant cachexia ([facility] [address] [facility]    Anxiety about health    Depression    Unintended weight loss    Chronic low back pain with bilateral sciatica    TMJ (temporomandibular joint syndrome)    Health care maintenance    Arthritis of right acromioclavicular joint       Past Medical History:   Past Medical History:   Diagnosis Date    Anxiety     Cancer ([address] )     Chronic pain disorder     back    Disease of thyroid gland     GERD (gastroesophageal reflux disease)     Pancreatic mass     Psychiatric disorder     anxiety       Surgical History:   Past Surgical History:   Procedure Laterality Date    APPENDECTOMY      BACK SURGERY      CHOLECYSTECTOMY      LEG SURGERY      right and left  osteo chondroma removed    LINEAR ENDOSCOPIC U/S N/A 1/16/2018    Procedure: LINEAR ENDOSCOPIC U/S;  Surgeon: Eamon Alejandro MD;  Location: [facility]; Service: Gastroenterology    ND INSJ TUNNELED CTR VAD W/SUBQ PORT AGE 5 YR/> N/A 5/15/2018    Procedure: INSERTION VENOUS PORT ( PORT-A-CATH) IR;  Surgeon: Evelia Anne DO;  [facility]: [facility];  Service: Interventional Radiology    TONSILLECTOMY      WHIPPLE PROCEDURE W/ LAPAROSCOPY         Family History:    Family History   Problem Relation Age of Onset    Cancer Father        Cancer-related family history includes Cancer in her father  Social History:   Social History     Social History    Marital status:      Spouse name: N/A    Number of children: N/A    Years of education: N/A     Occupational History    Not on file       Social History Main Topics    Smoking status: Current Every Day Smoker    Smokeless tobacco: Never Used    Alcohol use No    Drug use: Yes     Types: Marijuana      Comment: 1 month    Sexual activity: Not Currently     Other Topics Concern    Not on file     Social History Narrative    Wears seatbelt    No living will    Denies exercise habits    Regular dental care        Current Medications:   Current Outpatient Prescriptions   Medication Sig Dispense Refill    capecitabine (XELODA) 500 MG tablet Take 3 tablets (1,500 mg total) by mouth 2 (two) times a day 180 tablet 2    diazepam (VALIUM) 5 mg tablet Take 1 tablet (5 mg total) by mouth every 12 (twelve) hours as needed for anxiety 30 tablet 0    divalproex sodium (DEPAKOTE) 125 mg EC tablet 1 tab in am and 2 tabs at bedtime 90 tablet 3    FLUoxetine (PROzac) 40 MG capsule Take 1 capsule (40 mg total) by mouth daily 30 capsule 3    ibuprofen (MOTRIN) 800 mg tablet Take 1 tablet (800 mg total) by mouth every 8 (eight) hours as needed for moderate pain 90 tablet 3    levothyroxine 25 mcg tablet Take 25 mcg by mouth daily      LORazepam (ATIVAN) 0 5 mg tablet Take 1 tablet (0 5 mg total) by mouth every 8 (eight) hours as needed for anxiety 90 tablet 0    oxyCODONE (ROXICODONE) 5 mg immediate release tablet 1 tab every 4 hours as needed for pain and 2 tabs at bedtime 100 tablet 0    pancrelipase, Lip-Prot-Amyl, (CREON) 12,000 units capsule Take 12,000 units of lipase by mouth 3 (three) times a day with meals for 180 doses 180 capsule 3    pantoprazole (PROTONIX) 40 mg tablet Take 40 mg by mouth daily      senna-docusate sodium (SENOKOT-S) 8 6-50 mg per tablet Take 2 tablets by mouth daily for 30 days 7 tablet 0     No current facility-administered medications for this visit  Facility-Administered Medications Ordered in Other Visits   Medication Dose Route Frequency Provider Last Rate Last Dose    [START ON 7/27/2018] gemcitabine hcl (GEMZAR) 1,400 mg in sodium chloride 0 9 % 250 mL chemo infusion  1,400 mg Intravenous Once MD Ambrose Geronimo [START ON 7/27/2018] ondansetron (ZOFRAN) 16 mg, dexamethasone (DECADRON) 10 mg in sodium chloride 0 9 % 50 mL IVPB   Intravenous Once MD Ambrose eGronimo [START ON 7/27/2018] sodium chloride 0 9 % infusion  20 mL/hr Intravenous Once Gloria Trinidad MD           Allergies: Allergies   Allergen Reactions    Codeine Other (See Comments)     Feels crazy when taking it       Physical Exam:    Body surface area is 1 47 meters squared  Wt Readings from Last 3 Encounters:   07/25/18 49 kg (108 lb)   07/20/18 49 kg (108 lb 0 4 oz)   07/17/18 48 1 kg (106 lb)        Temp Readings from Last 3 Encounters:   07/25/18 98 °F (36 7 °C) (Tympanic)   07/20/18 98 8 °F (37 1 °C) (Tympanic)   07/17/18 97 5 °F (36 4 °C) (Oral)        BP Readings from Last 3 Encounters:   07/25/18 108/62   07/20/18 122/66   07/17/18 104/60         Pulse Readings from Last 3 Encounters:   07/25/18 79   07/20/18 78   07/17/18 92         Physical Exam     Constitutional   General appearance: No acute distress, well appearing and well nourished  Eyes   Conjunctiva and lids: No swelling, erythema or discharge  Pupils and irises: Equal, round and reactive to light  Ears, Nose, Mouth, and Throat   External inspection of ears and nose: Normal     Nasal mucosa, septum, and turbinates: Normal without edema or erythema  Oropharynx: Normal with no erythema, edema, exudate or lesions  Pulmonary   Respiratory effort: No increased work of breathing or signs of respiratory distress  Auscultation of lungs: Clear to auscultation  Cardiovascular   Palpation of heart: Normal PMI, no thrills  Auscultation of heart: Normal rate and rhythm, normal S1 and S2, without murmurs  Examination of extremities for edema and/or varicosities: Normal     Carotid pulses: Normal     Abdomen   Abdomen: Non-tender, no masses  Liver and spleen: No hepatomegaly or splenomegaly  Lymphatic   Palpation of lymph nodes in neck: No lymphadenopathy  Musculoskeletal   Gait and station: Normal     Digits and nails: Normal without clubbing or cyanosis  Inspection/palpation of joints, bones, and muscles: Normal     Skin   Skin and subcutaneous tissue: Normal without rashes or lesions  Neurologic   Cranial nerves: Cranial nerves 2-12 intact  Sensation: No sensory loss  Psychiatric   Orientation to person, place, and time: Normal     Mood and affect: Normal         Assessment / Plan:      Patient is a pleasant 76-year-old female with newly diagnosed localized pancreatic cancer status post resection  She got one cycle of adjuvant chemotherapy in [place] prior to transferring back to the [facility]  She had a port placed here and started her second cycle  There was some confusion over her Xeloda dose as the article based on which she is being treated calls for 830 mg meter squared twice a day for 3 out of 4 weeks  She was actually getting 1500 mg twice a day 2 out of 4 weeks  Gemcitabine 1000 M square day 1, 8, 15 every 28 days  Since she did well with her first cycle we continued the same regimen   Cycle #5 will start in August She had repeat imaging in June which showed no evidence of recurrence or metastatic disease  At this point she will continue on her regimen  When she finishes up cycle #5 she'll be referred to our colleagues in radiation oncology for discussion about radiation  We did have a discussion about changing treatment based on her CA-19-9 alone which would be unconventional on the patient decided not to do so which I think is very reasonable  I'll see her back in 3-4 weeks  Goals and Barriers:  Current Goal:  Prolong Survival from Pancreatic cancer  Barriers: None  Patient's Capacity to Self Care:  Patient  able to self care  Portions of the record may have been created with voice recognition software   Occasional wrong word or "sound a like" substitutions may have occurred due to the inherent limitations of voice recognition software   Read the chart carefully and recognize, using context, where substitutions have occurred

## 2018-07-26 ENCOUNTER — APPOINTMENT (OUTPATIENT)
Dept: LAB | Facility: HOSPITAL | Age: 63
End: 2018-07-26
Attending: INTERNAL MEDICINE
Payer: COMMERCIAL

## 2018-07-26 LAB
ALBUMIN SERPL BCP-MCNC: 3.2 G/DL (ref 3.5–5)
ALP SERPL-CCNC: 77 U/L (ref 46–116)
ALT SERPL W P-5'-P-CCNC: 24 U/L (ref 12–78)
ANION GAP SERPL CALCULATED.3IONS-SCNC: 2 MMOL/L (ref 4–13)
ANISOCYTOSIS BLD QL SMEAR: PRESENT
AST SERPL W P-5'-P-CCNC: 16 U/L (ref 5–45)
BASOPHILS # BLD MANUAL: 0 THOUSAND/UL (ref 0–0.1)
BASOPHILS NFR MAR MANUAL: 0 % (ref 0–1)
BILIRUB SERPL-MCNC: 0.1 MG/DL (ref 0.2–1)
BLASTS NFR BLD MANUAL: 1 %
BUN SERPL-MCNC: 22 MG/DL (ref 5–25)
BURR CELLS BLD QL SMEAR: PRESENT
CALCIUM SERPL-MCNC: 8.7 MG/DL (ref 8.3–10.1)
CHLORIDE SERPL-SCNC: 104 MMOL/L (ref 100–108)
CO2 SERPL-SCNC: 33 MMOL/L (ref 21–32)
CREAT SERPL-MCNC: 0.7 MG/DL (ref 0.6–1.3)
DACRYOCYTES BLD QL SMEAR: PRESENT
EOSINOPHIL # BLD MANUAL: 0.05 THOUSAND/UL (ref 0–0.4)
EOSINOPHIL NFR BLD MANUAL: 1 % (ref 0–6)
ERYTHROCYTE [DISTWIDTH] IN BLOOD BY AUTOMATED COUNT: 22.4 % (ref 11.6–15.1)
GFR SERPL CREATININE-BSD FRML MDRD: 94 ML/MIN/1.73SQ M
GLUCOSE SERPL-MCNC: 99 MG/DL (ref 65–140)
HCT VFR BLD AUTO: 25.2 % (ref 34.8–46.1)
HGB BLD-MCNC: 8.5 G/DL (ref 11.5–15.4)
LYMPHOCYTES # BLD AUTO: 3.4 THOUSAND/UL (ref 0.6–4.47)
LYMPHOCYTES # BLD AUTO: 65 % (ref 14–44)
MACROCYTES BLD QL AUTO: PRESENT
MCH RBC QN AUTO: 41.3 PG (ref 26.8–34.3)
MCHC RBC AUTO-ENTMCNC: 33.7 G/DL (ref 31.4–37.4)
MCV RBC AUTO: 122 FL (ref 82–98)
MONOCYTES # BLD AUTO: 0.31 THOUSAND/UL (ref 0–1.22)
MONOCYTES NFR BLD: 6 % (ref 4–12)
NEUTROPHILS # BLD MANUAL: 1.41 THOUSAND/UL (ref 1.85–7.62)
NEUTS SEG NFR BLD AUTO: 27 % (ref 43–75)
NRBC BLD AUTO-RTO: 1 /100 WBC (ref 0–2)
OVALOCYTES BLD QL SMEAR: PRESENT
PLATELET # BLD AUTO: 186 THOUSANDS/UL (ref 149–390)
PLATELET BLD QL SMEAR: ADEQUATE
PMV BLD AUTO: 9.4 FL (ref 8.9–12.7)
POIKILOCYTOSIS BLD QL SMEAR: PRESENT
POLYCHROMASIA BLD QL SMEAR: PRESENT
POTASSIUM SERPL-SCNC: 4.4 MMOL/L (ref 3.5–5.3)
PROT SERPL-MCNC: 6.8 G/DL (ref 6.4–8.2)
RBC # BLD AUTO: 2.06 MILLION/UL (ref 3.81–5.12)
RBC MORPH BLD: PRESENT
SODIUM SERPL-SCNC: 139 MMOL/L (ref 136–145)
TARGETS BLD QL SMEAR: PRESENT
TOTAL CELLS COUNTED SPEC: 100
WBC # BLD AUTO: 5.23 THOUSAND/UL (ref 4.31–10.16)

## 2018-07-26 PROCEDURE — 86301 IMMUNOASSAY TUMOR CA 19-9: CPT | Performed by: INTERNAL MEDICINE

## 2018-07-26 PROCEDURE — 36415 COLL VENOUS BLD VENIPUNCTURE: CPT | Performed by: INTERNAL MEDICINE

## 2018-07-26 PROCEDURE — 85027 COMPLETE CBC AUTOMATED: CPT | Performed by: INTERNAL MEDICINE

## 2018-07-26 PROCEDURE — 85007 BL SMEAR W/DIFF WBC COUNT: CPT | Performed by: INTERNAL MEDICINE

## 2018-07-26 PROCEDURE — 80053 COMPREHEN METABOLIC PANEL: CPT | Performed by: INTERNAL MEDICINE

## 2018-07-26 RX ORDER — ACETAMINOPHEN 325 MG/1
650 TABLET ORAL ONCE
Status: COMPLETED | OUTPATIENT
Start: 2018-07-27 | End: 2018-07-27

## 2018-07-27 ENCOUNTER — DOCUMENTATION (OUTPATIENT)
Dept: NUTRITION | Facility: HOSPITAL | Age: 63
End: 2018-07-27

## 2018-07-27 ENCOUNTER — HOSPITAL ENCOUNTER (OUTPATIENT)
Dept: INFUSION CENTER | Facility: HOSPITAL | Age: 63
Discharge: HOME | End: 2018-07-27
Payer: COMMERCIAL

## 2018-07-27 VITALS
HEART RATE: 75 BPM | WEIGHT: 109.23 LBS | BODY MASS INDEX: 19.36 KG/M2 | SYSTOLIC BLOOD PRESSURE: 89 MMHG | OXYGEN SATURATION: 97 % | DIASTOLIC BLOOD PRESSURE: 43 MMHG | HEIGHT: 63 IN | RESPIRATION RATE: 18 BRPM | TEMPERATURE: 97 F

## 2018-07-27 LAB — CANCER AG19-9 SERPL-ACNC: 81 U/ML (ref 0–35)

## 2018-07-27 PROCEDURE — 96413 CHEMO IV INFUSION 1 HR: CPT

## 2018-07-27 PROCEDURE — 96367 TX/PROPH/DG ADDL SEQ IV INF: CPT

## 2018-07-27 PROCEDURE — 96372 THER/PROPH/DIAG INJ SC/IM: CPT

## 2018-07-27 RX ORDER — CYANOCOBALAMIN 1000 UG/ML
1000 INJECTION INTRAMUSCULAR; SUBCUTANEOUS ONCE
Status: COMPLETED | OUTPATIENT
Start: 2018-07-27 | End: 2018-07-27

## 2018-07-27 RX ADMIN — DEXAMETHASONE SODIUM PHOSPHATE: 10 INJECTION, SOLUTION INTRAMUSCULAR; INTRAVENOUS at 13:25

## 2018-07-27 RX ADMIN — ACETAMINOPHEN 650 MG: 325 TABLET, FILM COATED ORAL at 13:29

## 2018-07-27 RX ADMIN — CYANOCOBALAMIN 1000 MCG: 1000 INJECTION, SOLUTION INTRAMUSCULAR at 14:08

## 2018-07-27 RX ADMIN — SODIUM CHLORIDE 20 ML/HR: 0.9 INJECTION, SOLUTION INTRAVENOUS at 13:25

## 2018-07-27 RX ADMIN — Medication 300 UNITS: at 14:31

## 2018-07-27 RX ADMIN — GEMCITABINE 1400 MG: 38 INJECTION, SOLUTION INTRAVENOUS at 13:55

## 2018-07-27 NOTE — PROGRESS NOTES
Brief nutrition visit today  Mandy Posadas is doing well and reports some wt gain recently  Wt (7/27/18) 109#, up 2# in 1 week  Mandy Posadas reports that she thinks 110# is a good wt for her, but is concerned that if she continues to gain wt, it will be hard to stop  Current BMI is 20 (normal)  She is currently in a healthy wt range  She denied nutrition concerns or questions today  Provided her with the [address] book for which she was very grateful  Discussed a healthy wt range and encouraged wt stability  Encouraged Maria Alejandra to reach out if she would like to speak to an RD and/or if her nutritional status changes  Will remain available prn

## 2018-07-31 ENCOUNTER — OFFICE VISIT (OUTPATIENT)
Dept: PALLIATIVE MEDICINE | Facility: CLINIC | Age: 63
End: 2018-07-31
Payer: COMMERCIAL

## 2018-07-31 VITALS
DIASTOLIC BLOOD PRESSURE: 48 MMHG | BODY MASS INDEX: 19.3 KG/M2 | RESPIRATION RATE: 18 BRPM | TEMPERATURE: 97.9 F | WEIGHT: 108.38 LBS | SYSTOLIC BLOOD PRESSURE: 100 MMHG | OXYGEN SATURATION: 99 % | HEART RATE: 79 BPM

## 2018-07-31 DIAGNOSIS — C25.2 MALIGNANT NEOPLASM OF TAIL OF PANCREAS (HCC): Primary | ICD-10-CM

## 2018-07-31 DIAGNOSIS — G89.29 CHRONIC BILATERAL LOW BACK PAIN WITH BILATERAL SCIATICA: ICD-10-CM

## 2018-07-31 DIAGNOSIS — M54.42 CHRONIC BILATERAL LOW BACK PAIN WITH BILATERAL SCIATICA: ICD-10-CM

## 2018-07-31 DIAGNOSIS — F41.8 ANXIETY ABOUT HEALTH: ICD-10-CM

## 2018-07-31 DIAGNOSIS — M54.41 CHRONIC BILATERAL LOW BACK PAIN WITH BILATERAL SCIATICA: ICD-10-CM

## 2018-07-31 PROCEDURE — 99213 OFFICE O/P EST LOW 20 MIN: CPT | Performed by: FAMILY MEDICINE

## 2018-07-31 NOTE — PROGRESS NOTES
Palliative LSW met briefly with Maria Alejandra during her appointment today  She states she is doing well this week  She had to re-apply for SSD/SSI because she missed a deadline  She has decided to cancel further meetings with the  because of her now reduced finances as she has cut back her work hours  Alin Patel will continue to follow and provide emotional support  Pt  thankful

## 2018-07-31 NOTE — PROGRESS NOTES
Assessment/Plan:    No problem-specific Assessment & Plan notes found for this encounter  Ongoing chemotherapy   has been up and down, was 77 and then increased to 103 and most recently is 81 [not a clear correlation with her disease or treatment]  Currently she feels that medications are effective and acupuncture providing benefit   Diagnoses and all orders for this visit:    Malignant neoplasm of tail of pancreas (HCC)    Anxiety about health    Chronic bilateral low back pain with bilateral sciatica          Subjective:      Patient ID: Efra Kirby is a 65 y o  female  Charol Katharina returns in outpatient followup  PDMP query shows no concerns  She continues with chemotherapy and continues to tolerate it other than fatigue  Her tumor markers went up to 103 and then down to 81 (no apparent correlation with disease or treatment)  She feels some benefit from acupuncture  She continues to work with MSW in [facility]  Her pain is adequately controlled with oxycocodne which she uses infrequently  She continues with valproic acid  The following portions of the patient's history were reviewed and updated as appropriate: allergies, current medications, past medical history, past social history, past surgical history and problem list     Review of Systems   Constitutional: Positive for fatigue  HENT: Negative  Eyes: Negative  Respiratory: Negative  Cardiovascular: Negative  Gastrointestinal: Positive for abdominal pain  Endocrine: Negative  Genitourinary: Negative  Musculoskeletal: Positive for back pain  Skin: Negative  Allergic/Immunologic: Negative  Neurological: Negative  Hematological: Negative  Psychiatric/Behavioral: Positive for dysphoric mood  The patient is nervous/anxious            Objective:      BP (!) 100/48 (BP Location: Right arm, Patient Position: Sitting, Cuff Size: Standard)   Pulse 79   Temp 97 9 °F (36 6 °C) (Oral)   Resp 18   Wt 49 2 kg (108 lb 6 oz)   LMP  (LMP Unknown)   SpO2 99%   BMI 19 30 kg/m²          Physical Exam   Constitutional: She is oriented to person, place, and time  She appears well-developed and well-nourished  No distress  HENT:   Head: Normocephalic and atraumatic  Right Ear: External ear normal    Left Ear: External ear normal    Nose: Nose normal    Mouth/Throat: Oropharynx is clear and moist  No oropharyngeal exudate  Eyes: Conjunctivae and EOM are normal  Pupils are equal, round, and reactive to light  Right eye exhibits no discharge  Left eye exhibits no discharge  No scleral icterus  Neck: Normal range of motion  Neck supple  No JVD present  No tracheal deviation present  No thyromegaly present  Cardiovascular: Normal rate, regular rhythm, normal heart sounds and intact distal pulses  Pulmonary/Chest: Effort normal and breath sounds normal  No stridor  Abdominal: Soft  Bowel sounds are normal  She exhibits no distension and no mass  There is tenderness  There is no rebound and no guarding  Musculoskeletal: Normal range of motion  She exhibits no edema, tenderness or deformity  Lymphadenopathy:     She has no cervical adenopathy  Neurological: She is alert and oriented to person, place, and time  She has normal reflexes  She displays normal reflexes  No cranial nerve deficit  She exhibits normal muscle tone  Coordination normal    Skin: Skin is warm and dry  No rash noted  She is not diaphoretic  No erythema  No pallor  Psychiatric: Her speech is normal  Thought content normal  Her mood appears anxious  She is hyperactive  Cognition and memory are normal  She expresses impulsivity  Vitals reviewed

## 2018-08-03 RX ORDER — SODIUM CHLORIDE 9 MG/ML
20 INJECTION, SOLUTION INTRAVENOUS CONTINUOUS
Status: DISPENSED | OUTPATIENT
Start: 2018-08-10 | End: 2018-08-11

## 2018-08-03 RX ORDER — SODIUM CHLORIDE 9 MG/ML
20 INJECTION, SOLUTION INTRAVENOUS CONTINUOUS
Status: DISCONTINUED | OUTPATIENT
Start: 2018-08-17 | End: 2018-08-16

## 2018-08-03 RX ORDER — SODIUM CHLORIDE 9 MG/ML
20 INJECTION, SOLUTION INTRAVENOUS CONTINUOUS
Status: DISPENSED | OUTPATIENT
Start: 2018-08-24 | End: 2018-08-25

## 2018-08-09 ENCOUNTER — APPOINTMENT (OUTPATIENT)
Dept: LAB | Facility: HOSPITAL | Age: 63
End: 2018-08-09
Attending: INTERNAL MEDICINE
Payer: COMMERCIAL

## 2018-08-09 RX ORDER — ACETAMINOPHEN 325 MG/1
650 TABLET ORAL ONCE
Status: COMPLETED | OUTPATIENT
Start: 2018-08-10 | End: 2018-08-10

## 2018-08-09 RX ORDER — CYANOCOBALAMIN 1000 UG/ML
1000 INJECTION INTRAMUSCULAR; SUBCUTANEOUS ONCE
Status: COMPLETED | OUTPATIENT
Start: 2018-08-10 | End: 2018-08-10

## 2018-08-10 ENCOUNTER — HOSPITAL ENCOUNTER (OUTPATIENT)
Dept: INFUSION CENTER | Facility: HOSPITAL | Age: 63
Discharge: HOME | End: 2018-08-10
Payer: COMMERCIAL

## 2018-08-10 VITALS
HEART RATE: 79 BPM | BODY MASS INDEX: 20.08 KG/M2 | WEIGHT: 109.13 LBS | OXYGEN SATURATION: 98 % | SYSTOLIC BLOOD PRESSURE: 91 MMHG | HEIGHT: 62 IN | RESPIRATION RATE: 18 BRPM | DIASTOLIC BLOOD PRESSURE: 54 MMHG | TEMPERATURE: 98.8 F

## 2018-08-10 PROCEDURE — 96367 TX/PROPH/DG ADDL SEQ IV INF: CPT

## 2018-08-10 PROCEDURE — 96413 CHEMO IV INFUSION 1 HR: CPT

## 2018-08-10 PROCEDURE — 96372 THER/PROPH/DIAG INJ SC/IM: CPT

## 2018-08-10 RX ADMIN — Medication 300 UNITS: at 12:10

## 2018-08-10 RX ADMIN — SODIUM CHLORIDE 20 ML/HR: 9 INJECTION, SOLUTION INTRAVENOUS at 11:00

## 2018-08-10 RX ADMIN — ACETAMINOPHEN 650 MG: 325 TABLET, FILM COATED ORAL at 11:19

## 2018-08-10 RX ADMIN — DEXAMETHASONE SODIUM PHOSPHATE: 10 INJECTION, SOLUTION INTRAMUSCULAR; INTRAVENOUS at 11:00

## 2018-08-10 RX ADMIN — GEMCITABINE 1500 MG: 38 INJECTION, SOLUTION INTRAVENOUS at 11:28

## 2018-08-10 RX ADMIN — CYANOCOBALAMIN 1000 MCG: 1000 INJECTION, SOLUTION INTRAMUSCULAR at 11:20

## 2018-08-14 ENCOUNTER — TELEPHONE (OUTPATIENT)
Dept: HEMATOLOGY ONCOLOGY | Facility: CLINIC | Age: 63
End: 2018-08-14

## 2018-08-14 ENCOUNTER — OFFICE VISIT (OUTPATIENT)
Dept: PALLIATIVE MEDICINE | Facility: CLINIC | Age: 63
End: 2018-08-14
Payer: COMMERCIAL

## 2018-08-14 VITALS
BODY MASS INDEX: 19.24 KG/M2 | RESPIRATION RATE: 18 BRPM | TEMPERATURE: 98.4 F | SYSTOLIC BLOOD PRESSURE: 108 MMHG | DIASTOLIC BLOOD PRESSURE: 60 MMHG | HEIGHT: 63 IN | WEIGHT: 108.6 LBS | OXYGEN SATURATION: 98 % | HEART RATE: 85 BPM

## 2018-08-14 DIAGNOSIS — Z71.89 COUNSELING AND COORDINATION OF CARE: Primary | ICD-10-CM

## 2018-08-14 DIAGNOSIS — R64 MALIGNANT CACHEXIA (HCC): Chronic | ICD-10-CM

## 2018-08-14 DIAGNOSIS — C25.2 MALIGNANT NEOPLASM OF TAIL OF PANCREAS (HCC): Primary | ICD-10-CM

## 2018-08-14 DIAGNOSIS — F41.8 ANXIETY ABOUT HEALTH: ICD-10-CM

## 2018-08-14 PROCEDURE — 99499 UNLISTED E&M SERVICE: CPT

## 2018-08-14 PROCEDURE — 99213 OFFICE O/P EST LOW 20 MIN: CPT | Performed by: FAMILY MEDICINE

## 2018-08-14 NOTE — PROGRESS NOTES
Libby Farmer presents today for f/u with Dr Sara Yuan  She requested to see SW as well for support  She was very tearful today, possibly stemming from a call from her sister in law, Sheron Rico  She called her three days ago inviting her to go to [place] with Harris's sisters  The plan would be to take Harris's ashes and spread them "at home" per his wishes  Since this new event she has been crying more and grieving  Explained this is normal and she may have episodes of increased sadness and tearfulness during this process  She is in need of obtaining a birth certificate with an official seal to qualify for her passport  She explained she finds this frustrating  She has to travel an hour away to apply for an official birth certificate [facility]?)  Libby Farmer disclosed she has been communicating with a man named Jose R Moss who appears to be a supportive friend  They have gone to [employer] and other social gatherings over the past week or so  She states she feels his intentions are to be "more than friends" but she has made it clear she is not ready for any kind of relationship like that  Discussed maintaining boundaries  Libby Farmer feels guilty she missed the cancer support groups last evening because she fell asleep  Spent extended time with Maria Alejandra providing support and validation for her feelings  She will return in 1 month, unless she requests supportive counseling prior to appt  Will follow

## 2018-08-15 NOTE — PROGRESS NOTES
Assessment/Plan:    No problem-specific Assessment & Plan notes found for this encounter  acupuncture performed and supportive listening provided with review of lab work and support from MSW  No medication changes needed  Diagnoses and all orders for this visit:    Malignant neoplasm of tail of pancreas ([address] )    Malignant cachexia (HCC)    Anxiety about health          Subjective:      Patient ID: Filemon Arevalo is a 65 y o  female  Lilyan Patter returns in outpatient follow-up  PDMP query shows no concerns  She continues with chemotherapy and has one more scheduled infusion  She has anxiety about what happens next (whether she will stay on some maintenance treatment or not  He most recent CA 19-9 was 94 and we reviewed the up and down nature of her results  She continues with intermittent pains around her incision  She is trying to get her husbands ashes back to [place]  The following portions of the patient's history were reviewed and updated as appropriate: allergies, current medications, past medical history, past social history, past surgical history and problem list     Review of Systems   Constitutional: Positive for activity change, fatigue and unexpected weight change  Negative for appetite change, chills, diaphoresis and fever  HENT: Negative  Eyes: Negative  Respiratory: Negative  Cardiovascular: Negative  Gastrointestinal: Positive for abdominal pain  Endocrine: Negative  Genitourinary: Negative  Musculoskeletal: Positive for back pain  Skin: Negative  Allergic/Immunologic: Negative  Neurological: Negative  Hematological: Negative  Psychiatric/Behavioral: The patient is nervous/anxious            Objective:      /60 (BP Location: Left arm, Patient Position: Sitting, Cuff Size: Standard)   Pulse 85   Temp 98 4 °F (36 9 °C) (Oral)   Resp 18   Ht 5' 2 5" (1 588 m)   Wt 49 3 kg (108 lb 9 6 oz)   LMP  (LMP Unknown)   SpO2 98%   BMI 19 55 kg/m² Physical Exam   Constitutional: She is oriented to person, place, and time  She appears cachectic  She is active and cooperative  She has a sickly appearance  HENT:   Head: Normocephalic and atraumatic  Right Ear: External ear normal    Left Ear: External ear normal    Nose: Nose normal    Mouth/Throat: Oropharynx is clear and moist    Eyes: Conjunctivae and EOM are normal  Pupils are equal, round, and reactive to light  Right eye exhibits no discharge  Left eye exhibits no discharge  No scleral icterus  Neck: Normal range of motion  Neck supple  No JVD present  No tracheal deviation present  No thyromegaly present  Cardiovascular: Normal rate, regular rhythm, normal heart sounds and intact distal pulses  Pulmonary/Chest: Effort normal and breath sounds normal  No stridor  Abdominal: Soft  Bowel sounds are normal  There is no hepatosplenomegaly, splenomegaly or hepatomegaly  There is tenderness  There is no rigidity, no rebound, no guarding, no CVA tenderness, no tenderness at McBurney's point and negative Eden's sign  No hernia  Hernia confirmed negative in the ventral area, confirmed negative in the right inguinal area and confirmed negative in the left inguinal area  Musculoskeletal: Normal range of motion  She exhibits no edema, tenderness or deformity  Feet:    Lymphadenopathy:     She has no cervical adenopathy  Neurological: She is alert and oriented to person, place, and time  She has normal reflexes  She displays normal reflexes  No cranial nerve deficit  She exhibits normal muscle tone  Coordination normal    Skin: Skin is warm and dry  No rash noted  No erythema  No pallor  Psychiatric: Her speech is normal  Thought content normal  Her mood appears anxious  She is withdrawn  Cognition and memory are normal  She expresses impulsivity  She exhibits a depressed mood  Vitals reviewed

## 2018-08-16 ENCOUNTER — TELEPHONE (OUTPATIENT)
Dept: HEMATOLOGY ONCOLOGY | Facility: CLINIC | Age: 63
End: 2018-08-16

## 2018-08-16 RX ORDER — CYANOCOBALAMIN 1000 UG/ML
1000 INJECTION INTRAMUSCULAR; SUBCUTANEOUS ONCE
Status: COMPLETED | OUTPATIENT
Start: 2018-08-17 | End: 2018-08-17

## 2018-08-16 RX ORDER — ACETAMINOPHEN 325 MG/1
650 TABLET ORAL ONCE
Status: COMPLETED | OUTPATIENT
Start: 2018-08-17 | End: 2018-08-17

## 2018-08-16 RX ORDER — SODIUM CHLORIDE 9 MG/ML
40 INJECTION, SOLUTION INTRAVENOUS ONCE
Status: COMPLETED | OUTPATIENT
Start: 2018-08-17 | End: 2018-08-17

## 2018-08-16 NOTE — TELEPHONE ENCOUNTER
Called [facility] and added one more refill  The patient still needs day 8 and 15 of cycle 5  Called the patient to inform her that they will be calling her to set up shipment

## 2018-08-16 NOTE — TELEPHONE ENCOUNTER
Per Pia Grapes, called the [facility] and they stated that a refill was sent in July  I called pt and she confirmed that she received said shipment but it was only good for 2 weeks and she had already ran out  Per Lanica we are to order 2 weeks worth of Xeloda so the pt can finish her 5th cycle

## 2018-08-16 NOTE — TELEPHONE ENCOUNTER
Patient called is having difficulty getting her Xeloda  Was told by office that a refill was called in but Amy Gonzalez has no record of this  Is out of the medication since yesterday  Pharmacy  [facility]  [phone]    Please let patient know when it will be delivered

## 2018-08-17 ENCOUNTER — APPOINTMENT (OUTPATIENT)
Dept: LAB | Facility: HOSPITAL | Age: 63
End: 2018-08-17
Attending: INTERNAL MEDICINE
Payer: COMMERCIAL

## 2018-08-17 ENCOUNTER — HOSPITAL ENCOUNTER (OUTPATIENT)
Dept: INFUSION CENTER | Facility: HOSPITAL | Age: 63
Discharge: HOME | End: 2018-08-17
Payer: COMMERCIAL

## 2018-08-17 VITALS
RESPIRATION RATE: 16 BRPM | HEART RATE: 79 BPM | HEIGHT: 63 IN | TEMPERATURE: 98 F | WEIGHT: 108.25 LBS | DIASTOLIC BLOOD PRESSURE: 52 MMHG | SYSTOLIC BLOOD PRESSURE: 92 MMHG | BODY MASS INDEX: 19.18 KG/M2

## 2018-08-17 DIAGNOSIS — C25.2 MALIGNANT NEOPLASM OF TAIL OF PANCREAS (HCC): ICD-10-CM

## 2018-08-17 PROCEDURE — 96372 THER/PROPH/DIAG INJ SC/IM: CPT

## 2018-08-17 PROCEDURE — 96367 TX/PROPH/DG ADDL SEQ IV INF: CPT

## 2018-08-17 PROCEDURE — 96413 CHEMO IV INFUSION 1 HR: CPT

## 2018-08-17 RX ADMIN — DEXAMETHASONE SODIUM PHOSPHATE: 10 INJECTION, SOLUTION INTRAMUSCULAR; INTRAVENOUS at 14:05

## 2018-08-17 RX ADMIN — GEMCITABINE 1500 MG: 38 INJECTION, SOLUTION INTRAVENOUS at 14:50

## 2018-08-17 RX ADMIN — SODIUM CHLORIDE 40 ML/HR: 0.9 INJECTION, SOLUTION INTRAVENOUS at 14:05

## 2018-08-17 RX ADMIN — Medication 300 UNITS: at 15:38

## 2018-08-17 RX ADMIN — ACETAMINOPHEN 650 MG: 325 TABLET, FILM COATED ORAL at 14:17

## 2018-08-17 RX ADMIN — CYANOCOBALAMIN 1000 MCG: 1000 INJECTION, SOLUTION INTRAMUSCULAR at 14:17

## 2018-08-23 RX ORDER — CYANOCOBALAMIN 1000 UG/ML
1000 INJECTION INTRAMUSCULAR; SUBCUTANEOUS ONCE
Status: COMPLETED | OUTPATIENT
Start: 2018-08-24 | End: 2018-08-24

## 2018-08-23 RX ORDER — ACETAMINOPHEN 325 MG/1
650 TABLET ORAL ONCE
Status: COMPLETED | OUTPATIENT
Start: 2018-08-24 | End: 2018-08-24

## 2018-08-24 ENCOUNTER — APPOINTMENT (OUTPATIENT)
Dept: LAB | Facility: HOSPITAL | Age: 63
End: 2018-08-24
Attending: INTERNAL MEDICINE
Payer: COMMERCIAL

## 2018-08-24 ENCOUNTER — HOSPITAL ENCOUNTER (OUTPATIENT)
Dept: INFUSION CENTER | Facility: HOSPITAL | Age: 63
Discharge: HOME | End: 2018-08-24
Payer: COMMERCIAL

## 2018-08-24 VITALS
BODY MASS INDEX: 19.1 KG/M2 | SYSTOLIC BLOOD PRESSURE: 100 MMHG | DIASTOLIC BLOOD PRESSURE: 49 MMHG | WEIGHT: 107.81 LBS | OXYGEN SATURATION: 97 % | HEIGHT: 63 IN | RESPIRATION RATE: 16 BRPM | HEART RATE: 92 BPM | TEMPERATURE: 98 F

## 2018-08-24 DIAGNOSIS — C25.2 MALIGNANT NEOPLASM OF TAIL OF PANCREAS (HCC): ICD-10-CM

## 2018-08-24 PROCEDURE — 96367 TX/PROPH/DG ADDL SEQ IV INF: CPT

## 2018-08-24 PROCEDURE — 86301 IMMUNOASSAY TUMOR CA 19-9: CPT

## 2018-08-24 PROCEDURE — 96372 THER/PROPH/DIAG INJ SC/IM: CPT

## 2018-08-24 PROCEDURE — 96413 CHEMO IV INFUSION 1 HR: CPT

## 2018-08-24 RX ADMIN — Medication 300 UNITS: at 12:23

## 2018-08-24 RX ADMIN — ACETAMINOPHEN 650 MG: 325 TABLET, FILM COATED ORAL at 11:07

## 2018-08-24 RX ADMIN — ONDANSETRON HYDROCHLORIDE: 2 INJECTION, SOLUTION INTRAMUSCULAR; INTRAVENOUS at 11:02

## 2018-08-24 RX ADMIN — SODIUM CHLORIDE 20 ML/HR: 9 INJECTION, SOLUTION INTRAVENOUS at 11:02

## 2018-08-24 RX ADMIN — GEMCITABINE 1500 MG: 38 INJECTION, SOLUTION INTRAVENOUS at 11:45

## 2018-08-24 RX ADMIN — CYANOCOBALAMIN 1000 MCG: 1000 INJECTION, SOLUTION INTRAMUSCULAR at 11:07

## 2018-08-26 LAB — CANCER AG19-9 SERPL-ACNC: 107 U/ML (ref 0–35)

## 2018-08-28 ENCOUNTER — OFFICE VISIT (OUTPATIENT)
Dept: FAMILY MEDICINE CLINIC | Facility: HOSPITAL | Age: 63
End: 2018-08-28
Payer: COMMERCIAL

## 2018-08-28 VITALS
BODY MASS INDEX: 19.05 KG/M2 | WEIGHT: 107.5 LBS | DIASTOLIC BLOOD PRESSURE: 60 MMHG | HEIGHT: 63 IN | OXYGEN SATURATION: 98 % | HEART RATE: 81 BPM | SYSTOLIC BLOOD PRESSURE: 108 MMHG

## 2018-08-28 DIAGNOSIS — M54.41 CHRONIC BILATERAL LOW BACK PAIN WITH BILATERAL SCIATICA: Primary | ICD-10-CM

## 2018-08-28 DIAGNOSIS — M51.16 LUMBAR DISC DISEASE WITH RADICULOPATHY: ICD-10-CM

## 2018-08-28 DIAGNOSIS — G89.29 CHRONIC BILATERAL LOW BACK PAIN WITH BILATERAL SCIATICA: Primary | ICD-10-CM

## 2018-08-28 DIAGNOSIS — M54.42 CHRONIC BILATERAL LOW BACK PAIN WITH BILATERAL SCIATICA: Primary | ICD-10-CM

## 2018-08-28 PROBLEM — M19.011 ARTHRITIS OF RIGHT ACROMIOCLAVICULAR JOINT: Status: RESOLVED | Noted: 2018-05-22 | Resolved: 2018-08-28

## 2018-08-28 PROBLEM — M26.609 TMJ (TEMPOROMANDIBULAR JOINT SYNDROME): Status: RESOLVED | Noted: 2018-05-08 | Resolved: 2018-08-28

## 2018-08-28 PROCEDURE — 99214 OFFICE O/P EST MOD 30 MIN: CPT | Performed by: INTERNAL MEDICINE

## 2018-08-28 NOTE — PATIENT INSTRUCTIONS
You were seen today for an acute episode of illness and feeling unwell  Be sure to rest, drink fluids and take any medications ordered  If the doctor ordered testing, please get this done promptly and be sure to follow up with the doctor's office  You will get a call if any of the tests show abnormal results  Refer to neurosurgery for further evaluation and management  Might need more recent MRI which they can order if needed

## 2018-08-28 NOTE — PROGRESS NOTES
Assessment/Plan:    Chronic low back pain with bilateral sciatica  Numbness and leg pain,  MRI in March with degen disc disease and disc bulge  Patient Active Problem List   Diagnosis    Malignant ne[facility] of [facility] )    Psychiatric disorder    [facility]    Anxiety about health    Depression    Unintended weight loss    Chronic low back pain with bilateral sciatica    Health care maintenance     No orders of the defined types were placed in this encounter  Diagnoses and all orders for this visit:    Chronic bilateral low back pain with bilateral sciatica          Subjective:      Patient ID: Dina Dee is a 65 y o  female  Acute visit leg pain and numbness    Hx of spine surgery in past   Ongoing issues with pain, burning and numbness in legs, this worsens with walking  Did see ortho for this and in had MRI showing diffuse disc bulging  Now symptoms have progressed  This is complicated by diagnosis of pancreatic CA for which she has undergone chemotx and is planned to begin XRT soon  The following portions of the patient's history were reviewed and updated as appropriate: allergies, current medications, past family history, past medical history, past social history, past surgical history and problem list     Review of Systems   Constitutional: Positive for fatigue  Negative for fever  HENT: Negative for hearing loss  Eyes: Negative for visual disturbance  Respiratory: Negative for cough, chest tightness, shortness of breath and wheezing  Cardiovascular: Negative for chest pain, palpitations and leg swelling  Gastrointestinal: Positive for abdominal pain  Negative for diarrhea and nausea  Genitourinary: Negative for dysuria and hematuria  Musculoskeletal: Positive for arthralgias, back pain and myalgias  Neurological: Positive for weakness and numbness  Negative for dizziness and headaches     Psychiatric/Behavioral: Negative for confusion and dysphoric mood  All other systems reviewed and are negative  Objective:      Current Outpatient Prescriptions:     capecitabine (XELODA) 500 MG tablet, Take 3 tablets (1,500 mg total) by mouth 2 (two) times a day, Disp: 180 tablet, Rfl: 2    diazepam (VALIUM) 5 mg tablet, Take 1 tablet (5 mg total) by mouth every 12 (twelve) hours as needed for anxiety, Disp: 30 tablet, Rfl: 0    divalproex sodium (DEPAKOTE) 125 mg EC tablet, 1 tab in am and 2 tabs at bedtime, Disp: 90 tablet, Rfl: 3    FLUoxetine (PROzac) 40 MG capsule, Take 1 capsule (40 mg total) by mouth daily, Disp: 30 capsule, Rfl: 3    ibuprofen (MOTRIN) 800 mg tablet, Take 1 tablet (800 mg total) by mouth every 8 (eight) hours as needed for moderate pain, Disp: 90 tablet, Rfl: 3    levothyroxine 25 mcg tablet, Take 25 mcg by mouth daily, Disp: , Rfl:     LORazepam (ATIVAN) 0 5 mg tablet, Take 1 tablet (0 5 mg total) by mouth every 8 (eight) hours as needed for anxiety, Disp: 90 tablet, Rfl: 0    oxyCODONE (ROXICODONE) 5 mg immediate release tablet, 1 tab every 4 hours as needed for pain and 2 tabs at bedtime, Disp: 100 tablet, Rfl: 0    pancrelipase, Lip-Prot-Amyl, (CREON) 12,000 units capsule, Take 12,000 units of lipase by mouth 3 (three) times a day with meals for 180 doses, Disp: 180 capsule, Rfl: 3    pantoprazole (PROTONIX) 40 mg tablet, Take 40 mg by mouth daily, Disp: , Rfl:     senna-docusate sodium (SENOKOT-S) 8 6-50 mg per tablet, Take 2 tablets by mouth daily for 30 days, Disp: 7 tablet, Rfl: 0     Physical Exam   Constitutional: She is oriented to person, place, and time  She appears well-developed and well-nourished  Eyes: Pupils are equal, round, and reactive to light  Neck: Normal range of motion  Neck supple  No thyromegaly present  Cardiovascular: Normal rate, regular rhythm, normal heart sounds and intact distal pulses  No murmur heard    Pulmonary/Chest: Effort normal and breath sounds normal  She has no wheezes  She has no rales  Abdominal: Soft  Bowel sounds are normal  There is no tenderness  Musculoskeletal: Normal range of motion  She exhibits no edema, tenderness or deformity  Lymphadenopathy:     She has no cervical adenopathy  Neurological: She is alert and oriented to person, place, and time  She has normal reflexes  Skin: Skin is warm and dry  Psychiatric: She has a normal mood and affect  Nursing note and vitals reviewed

## 2018-08-31 ENCOUNTER — TELEPHONE (OUTPATIENT)
Dept: FAMILY MEDICINE CLINIC | Facility: HOSPITAL | Age: 63
End: 2018-08-31

## 2018-09-05 ENCOUNTER — OFFICE VISIT (OUTPATIENT)
Dept: HEMATOLOGY ONCOLOGY | Facility: HOSPITAL | Age: 63
End: 2018-09-05
Payer: COMMERCIAL

## 2018-09-05 VITALS
HEIGHT: 63 IN | WEIGHT: 106 LBS | OXYGEN SATURATION: 97 % | DIASTOLIC BLOOD PRESSURE: 66 MMHG | HEART RATE: 80 BPM | RESPIRATION RATE: 16 BRPM | TEMPERATURE: 98.5 F | BODY MASS INDEX: 18.78 KG/M2 | SYSTOLIC BLOOD PRESSURE: 112 MMHG

## 2018-09-05 DIAGNOSIS — C25.2 MALIGNANT NEOPLASM OF TAIL OF PANCREAS (HCC): Primary | ICD-10-CM

## 2018-09-05 PROCEDURE — 99214 OFFICE O/P EST MOD 30 MIN: CPT | Performed by: INTERNAL MEDICINE

## 2018-09-05 NOTE — PROGRESS NOTES
Hematology/Oncology Outpatient Follow- up Note  Holly Rose 65 y o  female MRN: @ Encounter: [id number]        Date:  9/5/2018    Presenting Complaint/Diagnosis :   T2 N0 M0 pancreatic cancer status post resection     HPI:    The patient was  seen for initial consultation 6/4/56 regarding A newly resected pancreatic cancer  She was seen here at [place] [address] by our colleagues in surgical oncology  They were planning a resection of a pancreatic mass which was biopsy-proven to be adenocarcinoma  The patient had no family or support system here in the [facility] area so she decided to go to [place] where she has family to get her resection  She had her resection done and was found to have a T2 N0 M0 malignancy  Since she had no lymph nodes involved and was decided to give her 6 months of adjuvant chemotherapy with gemcitabine and Xeloda  From what she tells me she has been on Xeloda 1500 mg twice a day and gemcitabine thousand milligrams per meter square day 1 and 8 and 15  Xeloda is 2 out of 4 weeks  The gemcitabine was 3 out of 4 weeks  She got one cycle and then moved back home to [facility] so wishes to establish care  Previous Hematologic/ Oncologic History:      Surgery    Current Hematologic/ Oncologic Treatment:    Gemcitabine and Xeloda  Xeloda is 1500 mg twice a day and gemcitabine thousand milligrams per meter square day 1 and 8 and 15  Xeloda is 2 out of 4 weeks  The gemcitabine was 3 out of 4 weeks  This is the exact regimen and doses she was on in [place] and she got one cycle there  She then received 4 cycles and our hospital Last one having started on August 10  Interval History:      Patient returns for follow-up visit  Her CA-19-9 is elevated at 107  Again this has fluctuated but has been elevated for a while   Her imaging has not correlated with this and has been normal Her last imaging from 28 June I e  2 months ago had shown no evidence of local recurrence with probably benign sub-centimeter liver lesions for which follow-up was recommended  There was no convincing evidence of metastatic disease within the chest abdomen or pelvis  At this point she is finishing up 5 months of chemotherapy  Options include adjuvant radiation versus considering just completing 6 months of chemotherapy with no radiation  My preference would be for her to see our colleagues in radiation oncology and get an opinion as she did have A T2 lesion  She does need her imaging repeated because of the subcentimeter lesions in her liver to make sure these have not changed as I am concerned with her elevated CA-19-9  If she is to get concurrent chemoradiation we will obviously use 5-FU with her radiation  As far as symptoms are concerned the patient denies any nausea or vomiting  Denies any diarrhea  Does feel fatigued  Otherwise her 14 point review of systems today was negative  Test Results:    Imaging: No results found  Labs:   Lab Results   Component Value Date    WBC 3 86 (L) 08/24/2018    HGB 8 7 (L) 08/24/2018    HCT 25 6 (L) 08/24/2018     (H) 08/24/2018     (L) 08/24/2018     Lab Results   Component Value Date     08/24/2018    K 4 4 08/24/2018     08/24/2018    CO2 26 08/24/2018    ANIONGAP 8 10/26/2015    BUN 25 08/24/2018    CREATININE 0 56 (L) 08/24/2018    GLUCOSE 88 10/26/2015    GLUF 95 08/24/2018    CALCIUM 8 6 08/24/2018    AST 16 08/24/2018    ALT 19 08/24/2018    ALKPHOS 81 08/24/2018    PROT 7 6 10/26/2015    BILITOT 0 38 10/26/2015    EGFR 101 08/24/2018         Lab Results   Component Value Date    FGQBLQHP01 338 06/20/2016         ROS: As stated in the history of present illness otherwise his 14 point review of systems today was negative        Active Problems:   Patient Active Problem List   Diagnosis    Malignant neoplasm of tail of pancreas ([address] )    Psychiatric disorder    Malignant [facility] )    Anxiety about health    Depression    Unintended weight loss    Chronic low back pain with bilateral sciatica    Health care maintenance    Lumbar disc disease with radiculopathy       Past Medical History:   Past Medical History:   Diagnosis Date    Anxiety     Cancer ([address] )     Chronic pain disorder     back    Disease of thyroid gland     GERD (gastroesophageal reflux disease)     Pancreatic mass     Psychiatric disorder     anxiety       Surgical History:   Past Surgical History:   Procedure Laterality Date    APPENDECTOMY      BACK SURGERY      CHOLECYSTECTOMY      LEG SURGERY      right and left  osteo chondroma removed    LINEAR ENDOSCOPIC U/S N/A 1/16/2018    Procedure: LINEAR ENDOSCOPIC U/S;  Surgeon: Lino Wu MD;  [facility]: [facility]; Service: Gastroenterology    [place] INSJ TUNNELED CTR VAD W/SUBQ PORT AGE 5 YR/> N/A 5/15/2018    Procedure: INSERTION VENOUS PORT ( PORT-A-CATH) IR;  Surgeon: Philip cShwartz DO;  [facility];  Service: Interventional Radiology    TONSILLECTOMY      WHIPPLE PROCEDURE W/ LAPAROSCOPY         Family History:    Family History   Problem Relation Age of Onset    Cancer Father        Cancer-related family history includes Cancer in her father  Social History:   Social History     Social History    Marital status:      Spouse name: N/A    Number of children: N/A    Years of education: N/A     Occupational History    Not on file       Social History Main Topics    Smoking status: Current Every Day Smoker    Smokeless tobacco: Never Used    Alcohol use No    Drug use: Yes     Types: Marijuana      Comment: 1 month    Sexual activity: Not Currently     Other Topics Concern    Not on file     Social History Narrative    Wears seatbelt    No living will    Denies exercise habits    Regular dental care        Current Medications:   Current Outpatient Prescriptions   Medication Sig Dispense Refill    diazepam (VALIUM) 5 mg tablet Take 1 tablet (5 mg total) by mouth every 12 (twelve) hours as needed for anxiety 30 tablet 0    divalproex sodium (DEPAKOTE) 125 mg EC tablet 1 tab in am and 2 tabs at bedtime 90 tablet 3    FLUoxetine (PROzac) 40 MG capsule Take 1 capsule (40 mg total) by mouth daily 30 capsule 3    LORazepam (ATIVAN) 0 5 mg tablet Take 1 tablet (0 5 mg total) by mouth every 8 (eight) hours as needed for anxiety 90 tablet 0    oxyCODONE (ROXICODONE) 5 mg immediate release tablet 1 tab every 4 hours as needed for pain and 2 tabs at bedtime 100 tablet 0    pancrelipase, Lip-Prot-Amyl, (CREON) 12,000 units capsule Take 12,000 units of lipase by mouth 3 (three) times a day with meals for 180 doses 180 capsule 3    pantoprazole (PROTONIX) 40 mg tablet Take 40 mg by mouth daily      capecitabine (XELODA) 500 MG tablet Take 3 tablets (1,500 mg total) by mouth 2 (two) times a day (Patient not taking: Reported on 9/5/2018 ) 180 tablet 2    ibuprofen (MOTRIN) 800 mg tablet Take 1 tablet (800 mg total) by mouth every 8 (eight) hours as needed for moderate pain (Patient not taking: Reported on 9/5/2018 ) 90 tablet 3    levothyroxine 25 mcg tablet Take 25 mcg by mouth daily      senna-docusate sodium (SENOKOT-S) 8 6-50 mg per tablet Take 2 tablets by mouth daily for 30 days 7 tablet 0     No current facility-administered medications for this visit  Allergies: Allergies   Allergen Reactions    Codeine Other (See Comments)     Feels crazy when taking it       Physical Exam:    Body surface area is 1 47 meters squared      Wt Readings from Last 3 Encounters:   09/05/18 48 1 kg (106 lb)   08/28/18 48 8 kg (107 lb 8 oz)   08/24/18 48 9 kg (107 lb 12 9 oz)        Temp Readings from Last 3 Encounters:   09/05/18 98 5 °F (36 9 °C) (Tympanic)   08/24/18 98 °F (36 7 °C) (Tympanic)   08/17/18 98 °F (36 7 °C) (Temporal)        BP Readings from Last 3 Encounters:   09/05/18 112/66   08/28/18 108/60   08/24/18 (!) 100/49         Pulse Readings from Last 3 Encounters:   09/05/18 80   08/28/18 81 08/24/18 92        Physical Exam     Constitutional   General appearance: No acute distress, well appearing and well nourished  Eyes   Conjunctiva and lids: No swelling, erythema or discharge  Pupils and irises: Equal, round and reactive to light  Ears, Nose, Mouth, and Throat   External inspection of ears and nose: Normal     Nasal mucosa, septum, and turbinates: Normal without edema or erythema  Oropharynx: Normal with no erythema, edema, exudate or lesions  Pulmonary   Respiratory effort: No increased work of breathing or signs of respiratory distress  Auscultation of lungs: Clear to auscultation  Cardiovascular   Palpation of heart: Normal PMI, no thrills  Auscultation of heart: Normal rate and rhythm, normal S1 and S2, without murmurs  Examination of extremities for edema and/or varicosities: Normal     Carotid pulses: Normal     Abdomen   Abdomen: Non-tender, no masses  Liver and spleen: No hepatomegaly or splenomegaly  Lymphatic   Palpation of lymph nodes in neck: No lymphadenopathy  Musculoskeletal   Gait and station: Normal     Digits and nails: Normal without clubbing or cyanosis  Inspection/palpation of joints, bones, and muscles: Normal     Skin   Skin and subcutaneous tissue: Normal without rashes or lesions  Neurologic   Cranial nerves: Cranial nerves 2-12 intact  Sensation: No sensory loss  Psychiatric   Orientation to person, place, and time: Normal     Mood and affect: Normal         Assessment / Plan:      The patient is a pleasant 80-year-old female with newly diagnosed localized pancreatic cancer status post resection  She got one cycle of adjuvant chemotherapy in [place] prior to transferring back to the [facility]  I will continue the regimen that she was started on over there  She was apparently getting gemcitabine thousand centimeter squared day 1 and 8 and 15  She is getting xeloda 1500 mg twice a day 2 out of 4 weeks   This comes to approximately 1000 g/m²  The regimen on [facility] and in the [facility] presentation was actually 830 mg meter square twice a day  That regimen was over 3 weeks  Since she had already started therapy we discussed this with her at Her initial visit and since she was doing well she chose to stay on the same regimen Because of concerns for toxicity expressed by her if we changed anything because she had done well with her initial cycle in [place]  Again she has lost her  to cancer recently also and the course he had with his disease also has shaved some of her decisions as far as treatment is concerned  We had decided if she has any toxicity we will change her to the original regimen per the study  She did do well with 5 cycles of chemotherapy I e  5 months of treatment  Her CA-19-9 has been elevated although imaging has not correlated with any evidence of metastatic disease  She does have tiny subcentimeter lesions which per the report did not seem to be metastatic  Regardless I will now referred to our colleagues in radiation oncology to consider adjuvant concurrent chemoradiation  If she does get concurrent chemoradiation my preference would be to give her 5- mg meter squared CIV I per day Monday through Saturday  This would be while she is getting radiation  I will repeat imaging as soon as possible to reevaluate to make sure she does not have any metastatic disease and to relook at the lesions in the liver  If she has no metastatic disease she will proceed with concurrent chemoradiation  If however she has metastatic disease and she does understand treatment would be palliative  Goals and Barriers:  Current Goal:  Prolong Survival from Pancreatic cancer  Barriers: None  Patient's Capacity to Self Care:  Patient able to self care      Portions of the record may have been created with voice recognition software   Occasional wrong word or "sound a like" substitutions may have occurred due to the inherent limitations of voice recognition software   Read the chart carefully and recognize, using context, where substitutions have occurred

## 2018-09-06 DIAGNOSIS — M54.6 CHRONIC THORACIC BACK PAIN, UNSPECIFIED BACK PAIN LATERALITY: ICD-10-CM

## 2018-09-06 DIAGNOSIS — M54.50 LUMBAR BACK PAIN: Primary | ICD-10-CM

## 2018-09-06 DIAGNOSIS — G89.29 CHRONIC THORACIC BACK PAIN, UNSPECIFIED BACK PAIN LATERALITY: ICD-10-CM

## 2018-09-07 ENCOUNTER — TELEPHONE (OUTPATIENT)
Dept: FAMILY MEDICINE CLINIC | Facility: HOSPITAL | Age: 63
End: 2018-09-07

## 2018-09-11 ENCOUNTER — RADIATION ONCOLOGY CONSULT (OUTPATIENT)
Dept: RADIATION ONCOLOGY | Facility: HOSPITAL | Age: 63
End: 2018-09-11
Attending: RADIOLOGY
Payer: COMMERCIAL

## 2018-09-11 ENCOUNTER — CLINICAL SUPPORT (OUTPATIENT)
Dept: RADIATION ONCOLOGY | Facility: HOSPITAL | Age: 63
End: 2018-09-11
Payer: COMMERCIAL

## 2018-09-11 VITALS
RESPIRATION RATE: 18 BRPM | SYSTOLIC BLOOD PRESSURE: 134 MMHG | HEART RATE: 81 BPM | BODY MASS INDEX: 19.29 KG/M2 | OXYGEN SATURATION: 94 % | DIASTOLIC BLOOD PRESSURE: 70 MMHG | TEMPERATURE: 97.9 F | WEIGHT: 108.2 LBS

## 2018-09-11 DIAGNOSIS — C25.2 MALIGNANT NEOPLASM OF TAIL OF PANCREAS (HCC): ICD-10-CM

## 2018-09-11 DIAGNOSIS — C25.2 MALIGNANT NEOPLASM OF TAIL OF PANCREAS (HCC): Primary | ICD-10-CM

## 2018-09-11 PROCEDURE — 99215 OFFICE O/P EST HI 40 MIN: CPT | Performed by: RADIOLOGY

## 2018-09-11 RX ORDER — FLUOXETINE 20 MG/1
40 TABLET, FILM COATED ORAL DAILY
Refills: 1 | COMMUNITY
Start: 2018-09-05 | End: 2018-09-11 | Stop reason: SDUPTHER

## 2018-09-11 NOTE — PROGRESS NOTES
Jaymie Code  1955  Ms Osborne is a 65 y o  female    Chief Complaint   Patient presents with    Consult     Radiation Oncology       Assessment:  Stage IIA, pT3 (3 7 cm but invaded the peripancreatic soft tissue), pN 0 (25 lymph nodes negative for metastatic carcinoma) status post distal pancreatectomy with splenectomy followed by 6 months adjuvant chemotherapy which she just completed  Plan:  Adjuvant radiation therapy (IMRT) to pancreatic bed  Discussion and summary:  Despite favorable pathologic findings we are recommending adjuvant radiation therapy in view it was not organ confined and there was invasion of peripancreatic soft tissue  In addition, carcinoma arising from the tail of pancreas has a more aggressive tumor biology  We discuss with patient treatment course of 28 treatments to the pancreatic bed preferably by IMRT  Total dose will be 50 4 Gy  We inform her of the side effects of fatigue, possible nausea and decreased appetite  We did tell her according to your notes and per NCCN guideline she might get sensitizing concurrent chemotherapy  She is scheduled for CT of the chest, abdomen and pelvis for restaging  We gave her an appointment for CT simulation and planning at 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 and invades peripancreatic soft tissue    5/4/18 Seen by  Storm  Plan 6 months of adjuvant chemotherapy with gemcitabine and Xeloda  "Since she had no lymph nodes there was no plan to give her radiation according to her and this sounds reasonable  "    18 Cycle 2 of Gemzar and xeloda (first was in [place])    18 CT chest, abdomen and pelvis  IMPRESSION:  1  Status post distal pancreatectomy and splenectomy  No evidence of local recurrence  2   Probably benign subcentimeter liver lesions as above  Attention on future follow-up studies is recommended  3   No convincing evidence of metastatic disease within the chest, abdomen or pelvis  4   Unchanged severe emphysema and areas of subpleural fibrosis  18 Dr Marisel Pena  "At this point she is finishing up 5 months of chemotherapy  Options include adjuvant radiation versus considering just completing 6 months of chemotherapy with no radiation  My preference would be for her to see our colleagues in radiation oncology and get an opinion as she did have a T2 lesion"    18 CT chest, abdomen and pelvis scheduled    18 Dr Jono Fontana with Dr Trisha Herrera and MIRLANDE Castillo (, Suleman Cornejo,  of lung cancer in February)    Wt Readings from Last 3 Encounters:  18 48 1 kg (106 lb)  18 48 8 kg (107 lb 8 oz)  18 48 9 kg (107 lb 12 9 oz)     CA 19 9 was 107   CA 19 9 was 90   CA 19 9 was 94   CA 19 9 was 81   CA 19 9 was 103            Malignant neoplasm of tail of pancreas ([facility] [place] [facility] [address] )    2018 Initial Diagnosis     Malignant neoplasm of tail of pancreas ([facility] [place] [facility] [address] )       2018 Biopsy     Pancreas, tail (fine needle aspiration and cell block preparations):     - Malignant ([facility] Category VI)   - Findings suggest moderately differentiated adenocarcinoma  2018 Surgery     Distal pancreatectomy with splenectomy done in [place]  3 7 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    Additional evaluation for depression, suicide risk assesment, referral to PCP or psychiatry, medication started:  no    Advanced Care Planning for Patients >65 years  Advanced Care Planning Discussed:  yes  Patient named surrogate decision maker or care plan in chart: no    OB/GYN History:      Health Maintenance   Topic Date Due    MAMMOGRAM  1955    Pneumococcal PPSV23 Highest Risk Adult (1 of 3 - PCV13) 12/10/1975    DTaP,Tdap,and Td Vaccines (1 - Tdap) 12/10/1977    PAP SMEAR  12/10/1977    INFLUENZA VACCINE  2018    CRC Screening: Colonoscopy  2028       Patient Active Problem List   Diagnosis    Malignant neoplasm of tail of pancreas ([facility] [address]    Psychiatric disorder    Malignant cachexia ([facility] [address]    Anxiety about health    Depression    Unintended weight loss    Chronic low back pain with bilateral sciatica   [facility]    Lumbar disc disease with radiculopathy     Past Medical History:   Diagnosis Date    Anxiety     Cancer ([facility] [address]     Chronic pain disorder     back    Disease of thyroid gland     GERD (gastroesophageal reflux disease)     Pancreatic mass     Psychiatric disorder     anxiety     Past Surgical History:   Procedure Laterality Date    APPENDECTOMY      BACK SURGERY      CHOLECYSTECTOMY      LEG SURGERY      right and left  osteo chondroma removed    LINEAR ENDOSCOPIC U/S N/A 2018    Procedure: LINEAR ENDOSCOPIC U/S; Surgeon: Sho Boothe MD;  Location: [facility]; Service: Gastroenterology    WA INSJ TUNNELED CTR VAD W/SUBQ PORT AGE 5 YR/> N/A 5/15/2018    Procedure: INSERTION VENOUS PORT ( PORT-A-CATH) IR;  Surgeon: Lo Toure DO;  [facility];  Service: Interventional Radiology    TONSILLECTOMY      WHIPPLE PROCEDURE W/ LAPAROSCOPY       Family History   Problem Relation Age of Onset    Cancer Father      Social History     Social History    Marital status:      Spouse name: N/A    Number of children: N/A    Years of education: N/A     Occupational History    Not on file       Social History Main Topics    Smoking status: Current Every Day Smoker     Packs/day: 0 25    Smokeless tobacco: Never Used      Comment: trying to quit    Alcohol use No    Drug use: Yes     Types: Marijuana      Comment: 1 month    Sexual activity: Not Currently     Other Topics Concern    Not on file     Social History Narrative    Wears seatbelt    No living will    Denies exercise habits    Regular dental care        Current Outpatient Prescriptions:     diazepam (VALIUM) 5 mg tablet, Take 1 tablet (5 mg total) by mouth every 12 (twelve) hours as needed for anxiety, Disp: 30 tablet, Rfl: 0    divalproex sodium (DEPAKOTE) 125 mg EC tablet, 1 tab in am and 2 tabs at bedtime, Disp: 90 tablet, Rfl: 3    FLUoxetine (PROzac) 40 MG capsule, Take 1 capsule (40 mg total) by mouth daily, Disp: 30 capsule, Rfl: 3    ibuprofen (MOTRIN) 800 mg tablet, Take 1 tablet (800 mg total) by mouth every 8 (eight) hours as needed for moderate pain, Disp: 90 tablet, Rfl: 3    LORazepam (ATIVAN) 0 5 mg tablet, Take 1 tablet (0 5 mg total) by mouth every 8 (eight) hours as needed for anxiety, Disp: 90 tablet, Rfl: 0    oxyCODONE (ROXICODONE) 5 mg immediate release tablet, 1 tab every 4 hours as needed for pain and 2 tabs at bedtime, Disp: 100 tablet, Rfl: 0    pancrelipase, Lip-Prot-Amyl, (CREON) 12,000 units capsule, Take 12,000 units of lipase by mouth 3 (three) times a day with meals for 180 doses, Disp: 180 capsule, Rfl: 3    Allergies   Allergen Reactions    Codeine Other (See Comments)     Feels crazy when taking it       Review of Systems:  Review of Systems   Constitutional: Positive for fatigue  HENT: Negative  Eyes: Negative  Respiratory: Positive for cough and shortness of breath (on exertion, hx of emphysema)  Cardiovascular: Negative  Gastrointestinal: Negative  Endocrine: Negative  Genitourinary: Negative  Musculoskeletal: Positive for back pain (chronic back pain )  Skin: Negative  Allergic/Immunologic: Negative  Neurological: Positive for headaches  Patient states her balance is off    Hematological: Negative  Psychiatric/Behavioral: Positive for dysphoric mood (pt lost her  to cancer recently, he was treated here at [facility])  The patient is nervous/anxious  Vitals:    09/11/18 0844   BP: 134/70   BP Location: Right arm   Pulse: 81   Resp: 18   Temp: 97 9 °F (36 6 °C)   TempSrc: Temporal   SpO2: 94%   Weight: 49 1 kg (108 lb 3 2 oz)            Imaging:No results found      Teaching RT packet provided and discussed

## 2018-09-12 ENCOUNTER — HOSPITAL ENCOUNTER (OUTPATIENT)
Dept: CT IMAGING | Facility: HOSPITAL | Age: 63
Discharge: HOME | End: 2018-09-12
Attending: INTERNAL MEDICINE
Payer: COMMERCIAL

## 2018-09-12 DIAGNOSIS — C25.2 MALIGNANT NEOPLASM OF TAIL OF PANCREAS (HCC): ICD-10-CM

## 2018-09-12 PROCEDURE — 71260 CT THORAX DX C+: CPT

## 2018-09-12 PROCEDURE — 74177 CT ABD & PELVIS W/CONTRAST: CPT

## 2018-09-12 RX ADMIN — IOHEXOL 100 ML: 350 INJECTION, SOLUTION INTRAVENOUS at 08:06

## 2018-09-14 ENCOUNTER — TELEPHONE (OUTPATIENT)
Dept: HEMATOLOGY ONCOLOGY | Facility: CLINIC | Age: 63
End: 2018-09-14

## 2018-09-14 ENCOUNTER — APPOINTMENT (OUTPATIENT)
Dept: RADIATION ONCOLOGY | Facility: HOSPITAL | Age: 63
End: 2018-09-14
Attending: RADIOLOGY
Payer: COMMERCIAL

## 2018-09-14 PROCEDURE — 77334 RADIATION TREATMENT AID(S): CPT | Performed by: RADIOLOGY

## 2018-09-14 NOTE — TELEPHONE ENCOUNTER
Called pt and LMOM  Pt is to come see Dr Calista Blunt to go over the results of her CT scan on Monday 9/17  She was scheduled at 920  A message was left with this information

## 2018-09-17 ENCOUNTER — OFFICE VISIT (OUTPATIENT)
Dept: HEMATOLOGY ONCOLOGY | Facility: HOSPITAL | Age: 63
End: 2018-09-17
Payer: COMMERCIAL

## 2018-09-17 VITALS
WEIGHT: 108 LBS | SYSTOLIC BLOOD PRESSURE: 132 MMHG | HEIGHT: 63 IN | RESPIRATION RATE: 16 BRPM | DIASTOLIC BLOOD PRESSURE: 78 MMHG | TEMPERATURE: 98 F | OXYGEN SATURATION: 98 % | HEART RATE: 81 BPM | BODY MASS INDEX: 19.14 KG/M2

## 2018-09-17 DIAGNOSIS — C25.2 MALIGNANT NEOPLASM OF TAIL OF PANCREAS (HCC): Primary | ICD-10-CM

## 2018-09-17 PROCEDURE — 99214 OFFICE O/P EST MOD 30 MIN: CPT | Performed by: INTERNAL MEDICINE

## 2018-09-17 NOTE — PROGRESS NOTES
Hematology/Oncology Outpatient Follow- up Note  Efra Kirby 65 y o  female MRN: @ Encounter: [id number]        Date:  9/17/2018    Presenting Complaint/Diagnosis : T2 N0 M0 pancreatic cancer status post resection who was found to have a liver metastases on a CT scan done in September 2018  HPI:      The patient was  seen for initial consultation 5/4/2018 regarding A newly resected pancreatic cancer  She was seen here at [facility] by our colleagues in surgical oncology  They were planning a resection of a pancreatic mass which was biopsy-proven to be adenocarcinoma  The patient had no family or support system here in the [facility] area so she decided to go to [place] where she has family to get her resection  She had her resection done and was found to have a T2 N0 M0 malignancy  Since she had no lymph nodes involved and was decided to give her 6 months of adjuvant chemotherapy with gemcitabine and Xeloda  From what she tells me she has been on Xeloda 1500 mg twice a day and gemcitabine thousand milligrams per meter square day 1 and 8 and 15  Xeloda is 2 out of 4 weeks  The gemcitabine was 3 out of 4 weeks  She got one cycle and then moved back home to [facility] so wishes to establish care  Previous Hematologic/ Oncologic History:       Malignant neoplasm of tail of pancreas ([facility] )    1/8/2018 Initial Diagnosis     Malignant neoplasm of tail of pancreas ([facility] )       1/16/2018 Biopsy     Pancreas, tail (fine needle aspiration and cell block preparations):     - Malignant (PSC Category VI)   - Findings suggest moderately differentiated adenocarcinoma  2/22/2018 Surgery     Distal pancreatectomy with splenectomy done in [place]  3 7 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 and invades peripancreatic soft tissue  pT2, pN0          Chemotherapy     gemcitabine and Xeloda started in [place]    Second cycle initiated on 5/18/18          Surgery    Current Hematologic/ Oncologic Treatment:      Gemcitabine and Xeloda  Xeloda is 1500 mg twice a day and gemcitabine thousand milligrams per meter square day 1 and 8 and 15  Xeloda is 2 out of 4 weeks  The gemcitabine was 3 out of 4 weeks  This is the exact regimen and doses she was on in [place] and she got one cycle there  She then received 4 cycles and our hospital Last one having started on August 10  Interval History:      The patient returns for follow-up visit  We repeated imaging as her tumor marker was elevated Despite being on chemotherapy  We had a Mr  Previously and the scan was read as no evidence of metastatic disease  She now has a lesion in the liver measuring 1 2 cm  This is in the lateral segment of the left lobe  There is a suggestion on this scan retrospectively that there may have been a very small 0 7 cm lesion in this area which was obscured by streak artifact from cholecystectomy clips on the last scan  Regardless, this is very suspicious for metastatic lesion especially in light of her persistently elevated tumor markers since June of this year  I will set her up for a biopsy to prove this and if so I explained this is not curable disease  If this is metastatic which I suspect that is she will need lifelong chemotherapy with a limited life expectancy  Since she was on a 5-FU-based regimen with Gemcitabine at this point I advised I would switch to either modified FOLFOX or 5-FU with liposomal CPT-11  If she does not respond to these regimens then I would consider Abraxane with gemcitabine  The patient is agreeable to considering this  She is on an antidepressant  Denies any nausea denies any vomiting  The rest of her 14 point review of systems today was negative  Test Results:    Imaging: Ct Chest Abdomen Pelvis W Contrast    Result Date: 9/14/2018  Narrative: CT CHEST, ABDOMEN AND PELVIS WITH IV CONTRAST INDICATION:   C25 2: Malignant neoplasm of tail of pancreas    Distal pancreatectomy and splenectomy earlier this year follow-up by chemotherapy  COMPARISON:  CT of the chest, abdomen and pelvis from June 28, 2018  CT of the abdomen and pelvis from January 5, 2018  TECHNIQUE: CT examination of the chest, abdomen and pelvis was performed  Scanning through the abdomen was performed in arterial and portal venous phases according a protocol spefically designed to evaluate upper abdominal viscera  Axial, sagittal, and coronal 2D reformatted images were created from the source data and submitted for interpretation  Radiation dose length product (DLP) for this visit:  441 22 mGy-cm   This examination, like all CT scans performed in the [facility], was performed utilizing techniques to minimize radiation dose exposure, including the use of iterative  reconstruction and automated exposure control  IV Contrast:  100 mL of iohexol (OMNIPAQUE) Enteric Contrast: Enteric contrast was administered  FINDINGS: CHEST LUNGS:  Emphysematous changes, predominating in the upper lobes  Stable subpleural reticular opacities, typical of fibrosis, in the lower lobes, lingula and right middle lobe  3 mm calcified right upper lobe granuloma  No new pulmonary nodule, mass or  consolidation  PLEURA:  Unremarkable  HEART/GREAT VESSELS:  Coronary artery calcifications  Thoracic aorta and central pulmonary arteries normal in caliber  Port-A-Cath terminates in superior vena cava  MEDIASTINUM AND ROME: Several calcified mediastinal and right hilar lymph nodes  No lymphadenopathy or other mass  Esophagus unremarkable  Trachea and main stem bronchi normal  CHEST WALL AND LOWER NECK:   Unremarkable  ABDOMEN LIVER/BILIARY TREE:  Elongated, measuring 20 cm in length  1 2 cm hypoenhancing mass in the lateral segment of the left lobe (segment 3)  This is not evident on the CT from 1/5/2018  It is difficult to visualize on the more recent CT from 6/28/2018, due to streak artifact from cholecystectomy clips    There is suggestion of a 0 7 cm lesion in this area, however  No other evidence of liver mass  No evidence of the 6 mm segment 6 lesion noted on the last CT  Common bile duct normal in caliber  GALLBLADDER:  Postcholecystectomy  SPLEEN:  Post splenectomy  PANCREAS:  Post distal pancreatectomy  Remaining pancreas partially obscured by artifact from surgical clips but appears normal  ADRENAL GLANDS:  Unremarkable  KIDNEYS/URETERS:  Unremarkable  No hydronephrosis  STOMACH AND BOWEL:  Unremarkable  APPENDIX:  No findings to suggest appendicitis  ABDOMINOPELVIC CAVITY: No lymphadenopathy or mass  No ascites  No extraluminal gas  VESSELS:  Unremarkable for patient's age  PELVIS REPRODUCTIVE ORGANS:  Uterus unremarkable  No evidence of adnexal mass  URINARY BLADDER:  Unremarkable  ABDOMINAL WALL/INGUINAL REGIONS:  Unremarkable  OSSEOUS STRUCTURES:  Prior L4-L5 fusion  Hardware appears to be intact  No evidence of recent fracture or destructive lesion  Impression: 1   1 2 cm mass in the left lobe of the liver, not present on a CT from 1/5/2018, most likely metastasis  2   No other evidence of metastasis in the chest, abdomen, pelvis or included portions of the skeleton  3   Emphysema and pulmonary fibrosis    I personally discussed this study with PORTER ESPINO on 9/14/2018 at 12:42 PM  Workstation performed: [id number]       Labs:   Lab Results   Component Value Date    WBC 3 86 (L) 08/24/2018    HGB 8 7 (L) 08/24/2018    HCT 25 6 (L) 08/24/2018     (H) 08/24/2018     (L) 08/24/2018     Lab Results   Component Value Date     08/24/2018    K 4 4 08/24/2018     08/24/2018    CO2 26 08/24/2018    ANIONGAP 8 10/26/2015    BUN 25 08/24/2018    CREATININE 0 56 (L) 08/24/2018    GLUCOSE 88 10/26/2015    GLUF 95 08/24/2018    CALCIUM 8 6 08/24/2018    AST 16 08/24/2018    ALT 19 08/24/2018    ALKPHOS 81 08/24/2018    PROT 7 6 10/26/2015    BILITOT 0 38 10/26/2015    EGFR 101 08/24/2018       Lab Results Component Value Date    NCPRHGPP81 338 06/20/2016         ROS: As stated in the history of present illness otherwise his 14 point review of systems today was negative  Active Problems:   Patient Active Problem List   Diagnosis    [facility] [address] [facility]    Psychiatric disorder    [facility]    Anxiety about health    Depression    Unintended weight loss    Chronic low back pain with bilateral sciatica    Health care maintenance    Lumbar disc disease with radiculopathy       Past Medical History:   Past Medical History:   Diagnosis Date    Anxiety     [facility] ([facility] )     Chronic pain disorder     back    Disease of thyroid gland     GERD (gastroesophageal reflux disease)     Pancreatic mass     Psychiatric disorder     anxiety       Surgical History:   Past Surgical History:   Procedure Laterality Date    APPENDECTOMY      BACK SURGERY      CHOLECYSTECTOMY      LEG SURGERY      right and left  osteo chondroma removed    LINEAR ENDOSCOPIC U/S N/A 1/16/2018    Procedure: LINEAR ENDOSCOPIC U/S;  Surgeon: Aster Tillman MD;  [facility]: [facility]; Service: Gastroenterology    MD INSJ TUNNELED CTR VAD W/SUBQ PORT AGE 5 YR/> N/A 5/15/2018    Procedure: INSERTION VENOUS PORT ( PORT-A-CATH) IR;  Surgeon: Raquel Seymour DO;  [facility]: [facility];  Service: Interventional Radiology    TONSILLECTOMY      WHIPPLE PROCEDURE W/ LAPAROSCOPY         Family History:    Family History   Problem Relation Age of Onset    Cancer Father        Cancer-related family history includes Cancer in her father  Social History:   Social History     Social History    Marital status:      Spouse name: N/A    Number of children: N/A    Years of education: N/A     Occupational History    Not on file       Social History Main Topics    Smoking status: Current Every Day Smoker     Packs/day: 0 25    Smokeless tobacco: Never Used      Comment: trying to quit    Alcohol use No  Drug use: Yes     Types: Marijuana      Comment: 1 month    Sexual activity: Not Currently     Other Topics Concern    Not on file     Social History Narrative    Wears seatbelt    No living will    Denies exercise habits    Regular dental care        Current Medications:   Current Outpatient Prescriptions   Medication Sig Dispense Refill    diazepam (VALIUM) 5 mg tablet Take 1 tablet (5 mg total) by mouth every 12 (twelve) hours as needed for anxiety 30 tablet 0    divalproex sodium (DEPAKOTE) 125 mg EC tablet 1 tab in am and 2 tabs at bedtime 90 tablet 3    FLUoxetine (PROzac) 40 MG capsule Take 1 capsule (40 mg total) by mouth daily 30 capsule 3    ibuprofen (MOTRIN) 800 mg tablet Take 1 tablet (800 mg total) by mouth every 8 (eight) hours as needed for moderate pain 90 tablet 3    LORazepam (ATIVAN) 0 5 mg tablet Take 1 tablet (0 5 mg total) by mouth every 8 (eight) hours as needed for anxiety 90 tablet 0    oxyCODONE (ROXICODONE) 5 mg immediate release tablet 1 tab every 4 hours as needed for pain and 2 tabs at bedtime 100 tablet 0    pancrelipase, Lip-Prot-Amyl, (CREON) 12,000 units capsule Take 12,000 units of lipase by mouth 3 (three) times a day with meals for 180 doses 180 capsule 3     No current facility-administered medications for this visit  Allergies: Allergies   Allergen Reactions    Codeine Other (See Comments)     Feels crazy when taking it       Physical Exam:    Body surface area is 1 48 meters squared      Wt Readings from Last 3 Encounters:   09/17/18 49 kg (108 lb)   09/11/18 49 1 kg (108 lb 3 2 oz)   09/05/18 48 1 kg (106 lb)        Temp Readings from Last 3 Encounters:   09/17/18 98 °F (36 7 °C) (Tympanic)   09/11/18 97 9 °F (36 6 °C) (Temporal)   09/05/18 98 5 °F (36 9 °C) (Tympanic)        BP Readings from Last 3 Encounters:   09/17/18 132/78   09/11/18 134/70   09/05/18 112/66         Pulse Readings from Last 3 Encounters:   09/17/18 81   09/11/18 81   09/05/18 80 Physical Exam     Constitutional   General appearance: No acute distress, well appearing and well nourished  Eyes   Conjunctiva and lids: No swelling, erythema or discharge  Pupils and irises: Equal, round and reactive to light  Ears, Nose, Mouth, and Throat   External inspection of ears and nose: Normal     Nasal mucosa, septum, and turbinates: Normal without edema or erythema  Oropharynx: Normal with no erythema, edema, exudate or lesions  Pulmonary   Respiratory effort: No increased work of breathing or signs of respiratory distress  Auscultation of lungs: Clear to auscultation  Cardiovascular   Palpation of heart: Normal PMI, no thrills  Auscultation of heart: Normal rate and rhythm, normal S1 and S2, without murmurs  Examination of extremities for edema and/or varicosities: Normal     Carotid pulses: Normal     Abdomen   Abdomen: Non-tender, no masses  Liver and spleen: No hepatomegaly or splenomegaly  Lymphatic   Palpation of lymph nodes in neck: No lymphadenopathy  Musculoskeletal   Gait and station: Normal     Digits and nails: Normal without clubbing or cyanosis  Inspection/palpation of joints, bones, and muscles: Normal     Skin   Skin and subcutaneous tissue: Normal without rashes or lesions  Neurologic   Cranial nerves: Cranial nerves 2-12 intact  Sensation: No sensory loss  Psychiatric   Orientation to person, place, and time: Normal     Mood and affect: Normal         Assessment / Plan:    Patient is a pleasant 58-year-old female who's as she diagnosed with a locally advanced pancreatic cancer which was resected and then she was getting adjuvant chemotherapy with Xeloda and gemcitabine  Her tumor marker went up and the scan was done a few months ago which did not show any clear-cut evidence of progression  Her most recent imaging at the end of her adjuvant therapy shows a liver metastases  I will get a biopsy of this to prove stage IV disease   We went over the risks benefits and alternatives of further chemotherapy  I explained I would put her on modified FOLFOX 6  This will consist of 5-FU 2400 mg meter square, 5- mg meter square, leucovorin 400 mg meter square, oxaliplatin at 85 mg meter square  This will be every 2 weeks with Neulasta growth factor support  I explained the potential for side effects to include but not limited to mucositis, diarrhea, low blood counts, risk of infection, neuropathy, cold sensitivity, jaw claudication  She is already on Xeloda  The patient is agreeable to proceed  I will set her up to start within the next 2 weeks  I will check a tumor marker every month  I will reimage her in 3 months  Once we get the biopsy results if positive we will send tissue for molecular testing also  If She progresses on this I would consider gemcitabine and Abraxane or liposomal CPT-11 in combination with 5-FU  Goals and Barriers:  Current Goal:  Prolong Survival from Pancreatic cancer   Barriers: None  Patient's Capacity to Self Care:  Patient  able to self care  Portions of the record may have been created with voice recognition software   Occasional wrong word or "sound a like" substitutions may have occurred due to the inherent limitations of voice recognition software   Read the chart carefully and recognize, using context, where substitutions have occurred

## 2018-09-18 RX ORDER — FLUOROURACIL 50 MG/ML
600 INJECTION, SOLUTION INTRAVENOUS ONCE
Status: COMPLETED | OUTPATIENT
Start: 2018-09-19 | End: 2018-09-19

## 2018-09-19 ENCOUNTER — HOSPITAL ENCOUNTER (OUTPATIENT)
Dept: INFUSION CENTER | Facility: HOSPITAL | Age: 63
Discharge: HOME | End: 2018-09-19
Payer: COMMERCIAL

## 2018-09-19 ENCOUNTER — APPOINTMENT (OUTPATIENT)
Dept: LAB | Facility: HOSPITAL | Age: 63
End: 2018-09-19
Attending: INTERNAL MEDICINE
Payer: COMMERCIAL

## 2018-09-19 VITALS
HEART RATE: 79 BPM | DIASTOLIC BLOOD PRESSURE: 56 MMHG | WEIGHT: 107.36 LBS | RESPIRATION RATE: 16 BRPM | HEIGHT: 62 IN | TEMPERATURE: 98.2 F | BODY MASS INDEX: 19.76 KG/M2 | SYSTOLIC BLOOD PRESSURE: 119 MMHG

## 2018-09-19 LAB
ALBUMIN SERPL BCP-MCNC: 3.6 G/DL (ref 3.5–5)
ALP SERPL-CCNC: 85 U/L (ref 46–116)
ALT SERPL W P-5'-P-CCNC: 23 U/L (ref 12–78)
ANION GAP SERPL CALCULATED.3IONS-SCNC: 10 MMOL/L (ref 4–13)
ANISOCYTOSIS BLD QL SMEAR: PRESENT
AST SERPL W P-5'-P-CCNC: 19 U/L (ref 5–45)
BASOPHILS # BLD MANUAL: 0 THOUSAND/UL (ref 0–0.1)
BASOPHILS NFR MAR MANUAL: 0 % (ref 0–1)
BILIRUB SERPL-MCNC: 0.3 MG/DL (ref 0.2–1)
BUN SERPL-MCNC: 17 MG/DL (ref 5–25)
CALCIUM SERPL-MCNC: 8.8 MG/DL (ref 8.3–10.1)
CHLORIDE SERPL-SCNC: 99 MMOL/L (ref 100–108)
CO2 SERPL-SCNC: 27 MMOL/L (ref 21–32)
CREAT SERPL-MCNC: 0.61 MG/DL (ref 0.6–1.3)
EOSINOPHIL # BLD MANUAL: 0.17 THOUSAND/UL (ref 0–0.4)
EOSINOPHIL NFR BLD MANUAL: 2 % (ref 0–6)
ERYTHROCYTE [DISTWIDTH] IN BLOOD BY AUTOMATED COUNT: 16.7 % (ref 11.6–15.1)
GFR SERPL CREATININE-BSD FRML MDRD: 98 ML/MIN/1.73SQ M
GLUCOSE P FAST SERPL-MCNC: 97 MG/DL (ref 65–99)
HCT VFR BLD AUTO: 35 % (ref 34.8–46.1)
HGB BLD-MCNC: 11.9 G/DL (ref 11.5–15.4)
HOWELL-JOLLY BOD BLD QL SMEAR: PRESENT
LYMPHOCYTES # BLD AUTO: 2.18 THOUSAND/UL (ref 0.6–4.47)
LYMPHOCYTES # BLD AUTO: 25 % (ref 14–44)
MACROCYTES BLD QL AUTO: PRESENT
MCH RBC QN AUTO: 41.6 PG (ref 26.8–34.3)
MCHC RBC AUTO-ENTMCNC: 34 G/DL (ref 31.4–37.4)
MCV RBC AUTO: 122 FL (ref 82–98)
METAMYELOCYTES NFR BLD MANUAL: 1 % (ref 0–1)
MONOCYTES # BLD AUTO: 1.66 THOUSAND/UL (ref 0–1.22)
MONOCYTES NFR BLD: 19 % (ref 4–12)
NEUTROPHILS # BLD MANUAL: 4.45 THOUSAND/UL (ref 1.85–7.62)
NEUTS BAND NFR BLD MANUAL: 1 % (ref 0–8)
NEUTS SEG NFR BLD AUTO: 50 % (ref 43–75)
NRBC BLD AUTO-RTO: 0 /100 WBCS
PAPPENHEIMER BOD BLD QL SMEAR: PRESENT
PLATELET # BLD AUTO: 322 THOUSANDS/UL (ref 149–390)
PLATELET BLD QL SMEAR: ADEQUATE
PMV BLD AUTO: 10.3 FL (ref 8.9–12.7)
POTASSIUM SERPL-SCNC: 4.4 MMOL/L (ref 3.5–5.3)
PROT SERPL-MCNC: 7.6 G/DL (ref 6.4–8.2)
RBC # BLD AUTO: 2.86 MILLION/UL (ref 3.81–5.12)
RBC MORPH BLD: PRESENT
SODIUM SERPL-SCNC: 136 MMOL/L (ref 136–145)
TOTAL CELLS COUNTED SPEC: 100
VARIANT LYMPHS # BLD AUTO: 2 %
WBC # BLD AUTO: 8.72 THOUSAND/UL (ref 4.31–10.16)

## 2018-09-19 PROCEDURE — 86301 IMMUNOASSAY TUMOR CA 19-9: CPT | Performed by: INTERNAL MEDICINE

## 2018-09-19 PROCEDURE — 96372 THER/PROPH/DIAG INJ SC/IM: CPT

## 2018-09-19 PROCEDURE — 80053 COMPREHEN METABOLIC PANEL: CPT | Performed by: INTERNAL MEDICINE

## 2018-09-19 PROCEDURE — 96411 CHEMO IV PUSH ADDL DRUG: CPT

## 2018-09-19 PROCEDURE — G0498 CHEMO EXTEND IV INFUS W/PUMP: HCPCS

## 2018-09-19 PROCEDURE — 85027 COMPLETE CBC AUTOMATED: CPT | Performed by: INTERNAL MEDICINE

## 2018-09-19 PROCEDURE — 96413 CHEMO IV INFUSION 1 HR: CPT

## 2018-09-19 PROCEDURE — 96367 TX/PROPH/DG ADDL SEQ IV INF: CPT

## 2018-09-19 PROCEDURE — 85007 BL SMEAR W/DIFF WBC COUNT: CPT | Performed by: INTERNAL MEDICINE

## 2018-09-19 PROCEDURE — 36415 COLL VENOUS BLD VENIPUNCTURE: CPT | Performed by: INTERNAL MEDICINE

## 2018-09-19 PROCEDURE — 96368 THER/DIAG CONCURRENT INF: CPT

## 2018-09-19 PROCEDURE — 96415 CHEMO IV INFUSION ADDL HR: CPT

## 2018-09-19 RX ORDER — SODIUM CHLORIDE 9 MG/ML
20 INJECTION, SOLUTION INTRAVENOUS CONTINUOUS
Status: DISCONTINUED | OUTPATIENT
Start: 2018-09-19 | End: 2018-09-23 | Stop reason: HOSPADM

## 2018-09-19 RX ORDER — CYANOCOBALAMIN 1000 UG/ML
INJECTION INTRAMUSCULAR; SUBCUTANEOUS
Status: COMPLETED
Start: 2018-09-19 | End: 2018-09-19

## 2018-09-19 RX ORDER — CYANOCOBALAMIN 1000 UG/ML
1000 INJECTION INTRAMUSCULAR; SUBCUTANEOUS ONCE
Status: COMPLETED | OUTPATIENT
Start: 2018-09-19 | End: 2018-09-19

## 2018-09-19 RX ORDER — DEXTROSE MONOHYDRATE 50 MG/ML
20 INJECTION, SOLUTION INTRAVENOUS ONCE
Status: COMPLETED | OUTPATIENT
Start: 2018-09-19 | End: 2018-09-19

## 2018-09-19 RX ADMIN — CYANOCOBALAMIN 1000 MCG: 1000 INJECTION INTRAMUSCULAR; SUBCUTANEOUS at 13:58

## 2018-09-19 RX ADMIN — OXALIPLATIN 128 MG: 5 INJECTION, SOLUTION INTRAVENOUS at 11:07

## 2018-09-19 RX ADMIN — SODIUM CHLORIDE 20 ML/HR: 9 INJECTION, SOLUTION INTRAVENOUS at 10:23

## 2018-09-19 RX ADMIN — LEUCOVORIN CALCIUM 600 MG: 350 INJECTION, POWDER, LYOPHILIZED, FOR SOLUTION INTRAMUSCULAR; INTRAVENOUS at 11:03

## 2018-09-19 RX ADMIN — DEXTROSE MONOHYDRATE 20 ML/HR: 50 INJECTION, SOLUTION INTRAVENOUS at 10:55

## 2018-09-19 RX ADMIN — FLUOROURACIL 600 MG: 50 INJECTION, SOLUTION INTRAVENOUS at 13:24

## 2018-09-19 RX ADMIN — DEXAMETHASONE SODIUM PHOSPHATE: 10 INJECTION, SOLUTION INTRAMUSCULAR; INTRAVENOUS at 10:23

## 2018-09-19 NOTE — PROGRESS NOTES
folfox completed  Good blood return before and after chemotherapy  5 fu ivp given without reaction  Pt watched [facility] infusion pump video  All questions answered  Pt requested vitamin b 12  "it makes me feel less tired after chemo"  [facility] to [facility] geoffrey RN at Dr Nehemiah Singleton office and order obtained  Spoke with caitlin for pre auth  Per virgilioer guidelines, pt does not need cert for same  Med given  All connections to cadd pump made and secured with tape  Pump infusing upon discharge  Pt knows to return to [facility] for pump dc and neulasta Friday at 1145

## 2018-09-19 NOTE — PROGRESS NOTES
Sleeping soundly post benadryl  Was able to tolerate 100% of lunch tray  oxaliplatin and leucovorin continue without adverse reaction

## 2018-09-19 NOTE — PROGRESS NOTES
Admitted to infusion department today for first dose of folfox  Pt had been on xeloda and gemcitabine previously and tolerated well  Port accessed without difficulty  Positional blood return noted  education started on medications however pt is very emotional and stated "i dont want to know  I have the papers  Ill read them later " premeds tolerated  Chemotherapy started

## 2018-09-20 ENCOUNTER — DOCUMENTATION (OUTPATIENT)
Dept: RADIATION ONCOLOGY | Facility: HOSPITAL | Age: 63
End: 2018-09-20

## 2018-09-20 LAB — CANCER AG19-9 SERPL-ACNC: 566 U/ML (ref 0–35)

## 2018-09-20 NOTE — PROGRESS NOTES
Rec'd pt's DT upon which she rated her distress level as a 5  She noted the following to be problems: insurance/financial, sadness, worry, fatigue, dry/itchy skin, sleep and tingling in hands/feet  Reviewed pt's medical record  She has been seen by Portia CHRISTINE who has been providing supportive care to her  Will defer to Ms Lupe Tang as she continues to work with the pt

## 2018-09-21 ENCOUNTER — HOSPITAL ENCOUNTER (OUTPATIENT)
Dept: INFUSION CENTER | Facility: HOSPITAL | Age: 63
Discharge: HOME | End: 2018-09-21
Payer: COMMERCIAL

## 2018-09-21 VITALS — TEMPERATURE: 98.2 F

## 2018-09-21 PROCEDURE — 96372 THER/PROPH/DIAG INJ SC/IM: CPT

## 2018-09-21 RX ADMIN — PEGFILGRASTIM 6 MG: KIT SUBCUTANEOUS at 12:15

## 2018-09-21 RX ADMIN — Medication 300 UNITS: at 12:05

## 2018-09-21 NOTE — PROGRESS NOTES
Pt arrives on unit for cadd d/c  Res vol  0ml  Tolerated disconnect  Neulasta on pro applied to right upper arm  Education provided  Pt then left unit ambulatory with a steady gait

## 2018-09-24 NOTE — NURSING NOTE
Reviewed pre- procedure instructions with pt  Re-enforced NPO status  Phone number to IR given if pt has any further questions  All questions answered at this time

## 2018-09-27 ENCOUNTER — HOSPITAL ENCOUNTER (OUTPATIENT)
Dept: RADIOLOGY | Facility: HOSPITAL | Age: 63
Discharge: HOME | End: 2018-09-27
Attending: INTERNAL MEDICINE | Admitting: RADIOLOGY
Payer: COMMERCIAL

## 2018-09-27 VITALS
HEART RATE: 72 BPM | SYSTOLIC BLOOD PRESSURE: 129 MMHG | OXYGEN SATURATION: 100 % | RESPIRATION RATE: 16 BRPM | TEMPERATURE: 97.9 F | WEIGHT: 104 LBS | DIASTOLIC BLOOD PRESSURE: 60 MMHG | BODY MASS INDEX: 19.14 KG/M2 | HEIGHT: 62 IN

## 2018-09-27 DIAGNOSIS — T45.1X5A CHEMOTHERAPY-INDUCED NAUSEA: Primary | ICD-10-CM

## 2018-09-27 DIAGNOSIS — R11.0 CHEMOTHERAPY-INDUCED NAUSEA: Primary | ICD-10-CM

## 2018-09-27 DIAGNOSIS — C25.2 MALIGNANT NEOPLASM OF TAIL OF PANCREAS (HCC): ICD-10-CM

## 2018-09-27 LAB
INR PPP: 1.02 (ref 0.86–1.17)
PROTHROMBIN TIME: 13.5 SECONDS (ref 11.8–14.2)

## 2018-09-27 PROCEDURE — 88333 PATH CONSLTJ SURG CYTO XM 1: CPT | Performed by: PATHOLOGY

## 2018-09-27 PROCEDURE — 88307 TISSUE EXAM BY PATHOLOGIST: CPT | Performed by: PATHOLOGY

## 2018-09-27 PROCEDURE — 85610 PROTHROMBIN TIME: CPT | Performed by: RADIOLOGY

## 2018-09-27 PROCEDURE — 99153 MOD SED SAME PHYS/QHP EA: CPT

## 2018-09-27 PROCEDURE — 88342 IMHCHEM/IMCYTCHM 1ST ANTB: CPT | Performed by: PATHOLOGY

## 2018-09-27 PROCEDURE — 76942 ECHO GUIDE FOR BIOPSY: CPT

## 2018-09-27 PROCEDURE — 47000 NEEDLE BIOPSY OF LIVER PERQ: CPT

## 2018-09-27 PROCEDURE — 99152 MOD SED SAME PHYS/QHP 5/>YRS: CPT

## 2018-09-27 PROCEDURE — 88334 PATH CONSLTJ SURG CYTO XM EA: CPT | Performed by: PATHOLOGY

## 2018-09-27 PROCEDURE — 88341 IMHCHEM/IMCYTCHM EA ADD ANTB: CPT | Performed by: PATHOLOGY

## 2018-09-27 RX ORDER — SODIUM CHLORIDE 9 MG/ML
75 INJECTION, SOLUTION INTRAVENOUS CONTINUOUS
Status: DISCONTINUED | OUTPATIENT
Start: 2018-09-27 | End: 2018-09-27 | Stop reason: HOSPADM

## 2018-09-27 RX ORDER — FENTANYL CITRATE 50 UG/ML
INJECTION, SOLUTION INTRAMUSCULAR; INTRAVENOUS CODE/TRAUMA/SEDATION MEDICATION
Status: COMPLETED | OUTPATIENT
Start: 2018-09-27 | End: 2018-09-27

## 2018-09-27 RX ORDER — PROCHLORPERAZINE MALEATE 10 MG
10 TABLET ORAL EVERY 6 HOURS PRN
Qty: 30 TABLET | Refills: 1 | Status: SHIPPED | OUTPATIENT
Start: 2018-09-27

## 2018-09-27 RX ORDER — MIDAZOLAM HYDROCHLORIDE 1 MG/ML
INJECTION INTRAMUSCULAR; INTRAVENOUS CODE/TRAUMA/SEDATION MEDICATION
Status: COMPLETED | OUTPATIENT
Start: 2018-09-27 | End: 2018-09-27

## 2018-09-27 RX ORDER — HYDROCODONE BITARTRATE AND ACETAMINOPHEN 5; 325 MG/1; MG/1
1 TABLET ORAL EVERY 6 HOURS PRN
Status: DISCONTINUED | OUTPATIENT
Start: 2018-09-27 | End: 2018-09-27 | Stop reason: HOSPADM

## 2018-09-27 RX ORDER — DIAZEPAM 5 MG/1
5 TABLET ORAL EVERY 12 HOURS PRN
Qty: 30 TABLET | Refills: 0 | Status: SHIPPED | OUTPATIENT
Start: 2018-09-27

## 2018-09-27 RX ADMIN — MIDAZOLAM 0.5 MG: 1 INJECTION INTRAMUSCULAR; INTRAVENOUS at 08:44

## 2018-09-27 RX ADMIN — FENTANYL CITRATE 25 MCG: 50 INJECTION, SOLUTION INTRAMUSCULAR; INTRAVENOUS at 08:44

## 2018-09-27 RX ADMIN — FENTANYL CITRATE 25 MCG: 50 INJECTION, SOLUTION INTRAMUSCULAR; INTRAVENOUS at 08:41

## 2018-09-27 RX ADMIN — SODIUM CHLORIDE 75 ML/HR: 0.9 INJECTION, SOLUTION INTRAVENOUS at 07:18

## 2018-09-27 RX ADMIN — MIDAZOLAM 0.5 MG: 1 INJECTION INTRAMUSCULAR; INTRAVENOUS at 08:41

## 2018-09-27 RX ADMIN — MIDAZOLAM 1 MG: 1 INJECTION INTRAMUSCULAR; INTRAVENOUS at 08:32

## 2018-09-27 RX ADMIN — FENTANYL CITRATE 50 MCG: 50 INJECTION, SOLUTION INTRAMUSCULAR; INTRAVENOUS at 08:33

## 2018-09-27 NOTE — BRIEF OP NOTE (RAD/CATH)
IR IMAGE GUIDED BIOPSY/ASPIRATION LIVER  Procedure Note    PATIENT NAME: Wanda Espino  : 1955  MRN: [id number]     Pre-op Diagnosis:   1  Malignant neoplasm of tail of pancreas (HCC)      Post-op Diagnosis:   1  Malignant neoplasm of tail of pancreas [facility])        Surgeon:   Hettie Apley, MD  Assistants:     No qualified resident was available, Resident is only observing    Estimated Blood Loss: minimal  Findings: Successful ultrasound guided left lobe liver mass biopsy      Specimens: 18 gauge core biopsies    Complications:  None immediate    Anesthesia: Conscious sedation    Hettie Apley, MD     Date: 2018  Time: 9:24 AM

## 2018-09-27 NOTE — TELEPHONE ENCOUNTER
Pt called with multiple questions and concerns  Wanted to discuss liver mass that was biopsied today  States that when her last CT result came Dr Jasso Her had her come into the office and changed treatment right away  And wanted to know why since BX hadn't been done yet  Explained that liver mass went from 0 6 cm in June to 1 2 cm in September and Ca 19-9 566 so wanted to treat cancer with new drugs  Stated she was going to have RT to liver but then changed to chemo  Explained RT would only treat the spot on the liver and that if the pancreatic cancer spread to the liver then the cancer is in the blood stream so chemo will be in the blood stream and treat the whole body  She said Dr Jasso Her told her the cancer was not curable but could be treated  She asked what stage she is and informed when cancer spreads to another organ it becomes stage 4  Stated she was unaware Ca 19-9 went so high  Informed that was another reason to go with chemo vs RT  She said Dr Jasso Her told her this would affect her longevity but didn't ask how much time she has  Informed no one knows how time she has but there are treatments available  Stated she's only had 1 treatment of FOLFOX but has noticed SE's that she didn't have with Gemzar  She c/o nausea but does not have any nausea medicine  Pended Compazine to Dr Jasso Her  Also reported strange feeling with swallowing cold things  Informed that is from the Oxaliplatin and should stick with room temperature foods/fluids  Asked if having any trouble removing cold objects from refrigerator or freezer  Not yet  Informed she could and may need gloves for that and for steering wheel as we move into fall and winter  Also to have a scarf to cover mouth and nose when air is cold  States more fatigued  Explained not unusual as now she has more than one chemo drug  Pt was appreciative of time and information given her  Instructed to call with any problems or concerns

## 2018-09-27 NOTE — H&P (VIEW-ONLY)
Hematology/Oncology Outpatient Follow- up Note  Rob Salinas 65 y o  female MRN: @ Encounter: [id number]        Date:  9/5/2018    Presenting Complaint/Diagnosis :   T2 N0 M0 pancreatic cancer status post resection     HPI:    The patient was  seen for initial consultation 6/4/56 regarding A newly resected pancreatic cancer  She was seen here at [place] [address] by our colleagues in surgical oncology  They were planning a resection of a pancreatic mass which was biopsy-proven to be adenocarcinoma  The patient had no family or support system here in the [facility] area so she decided to go to [place] where she has family to get her resection  She had her resection done and was found to have a T2 N0 M0 malignancy  Since she had no lymph nodes involved and was decided to give her 6 months of adjuvant chemotherapy with gemcitabine and Xeloda  From what she tells me she has been on Xeloda 1500 mg twice a day and gemcitabine thousand milligrams per meter square day 1 and 8 and 15  Xeloda is 2 out of 4 weeks  The gemcitabine was 3 out of 4 weeks  She got one cycle and then moved back home to [facility] so wishes to establish care  Previous Hematologic/ Oncologic History:      Surgery    Current Hematologic/ Oncologic Treatment:    Gemcitabine and Xeloda  Xeloda is 1500 mg twice a day and gemcitabine thousand milligrams per meter square day 1 and 8 and 15  Xeloda is 2 out of 4 weeks  The gemcitabine was 3 out of 4 weeks  This is the exact regimen and doses she was on in [place] and she got one cycle there  She then received 4 cycles and our hospital Last one having started on August 10  Interval History:      Patient returns for follow-up visit  Her CA-19-9 is elevated at 107  Again this has fluctuated but has been elevated for a while   Her imaging has not correlated with this and has been normal Her last imaging from 28 June I e  2 months ago had shown no evidence of local recurrence with probably benign sub-centimeter liver lesions for which follow-up was recommended  There was no convincing evidence of metastatic disease within the chest abdomen or pelvis  At this point she is finishing up 5 months of chemotherapy  Options include adjuvant radiation versus considering just completing 6 months of chemotherapy with no radiation  My preference would be for her to see our colleagues in radiation oncology and get an opinion as she did have A T2 lesion  She does need her imaging repeated because of the subcentimeter lesions in her liver to make sure these have not changed as I am concerned with her elevated CA-19-9  If she is to get concurrent chemoradiation we will obviously use 5-FU with her radiation  As far as symptoms are concerned the patient denies any nausea or vomiting  Denies any diarrhea  Does feel fatigued  Otherwise her 14 point review of systems today was negative  Test Results:    Imaging: No results found  Labs:   Lab Results   Component Value Date    WBC 3 86 (L) 08/24/2018    HGB 8 7 (L) 08/24/2018    HCT 25 6 (L) 08/24/2018     (H) 08/24/2018     (L) 08/24/2018     Lab Results   Component Value Date     08/24/2018    K 4 4 08/24/2018     08/24/2018    CO2 26 08/24/2018    ANIONGAP 8 10/26/2015    BUN 25 08/24/2018    CREATININE 0 56 (L) 08/24/2018    GLUCOSE 88 10/26/2015    GLUF 95 08/24/2018    CALCIUM 8 6 08/24/2018    AST 16 08/24/2018    ALT 19 08/24/2018    ALKPHOS 81 08/24/2018    PROT 7 6 10/26/2015    BILITOT 0 38 10/26/2015    EGFR 101 08/24/2018         Lab Results   Component Value Date    ZHGWTBXB84 338 06/20/2016         ROS: As stated in the history of present illness otherwise his 14 point review of systems today was negative        Active Problems:   Patient Active Problem List   Diagnosis    Malignant neoplasm of tail of pancreas ([address] )    Psychiatric disorder    Malignant cachexia [facility] [address]    Anxiety about health    Depression    Unintended weight loss    Chronic low back pain with bilateral sciatica    Health care maintenance    Lumbar disc disease with radiculopathy       Past Medical History:   Past Medical History:   Diagnosis Date    Anxiety     Cancer ([address] )     Chronic pain disorder     back    Disease of thyroid gland     GERD (gastroesophageal reflux disease)     Pancreatic mass     Psychiatric disorder     anxiety       Surgical History:   Past Surgical History:   Procedure Laterality Date    APPENDECTOMY      BACK SURGERY      CHOLECYSTECTOMY      LEG SURGERY      right and left  osteo chondroma removed    LINEAR ENDOSCOPIC U/S N/A 1/16/2018    Procedure: LINEAR ENDOSCOPIC U/S;  Surgeon: Nydia Vasquez MD;  Location: [facility]; Service: Gastroenterology    [place] [facility] TUNNELED CTR VAD W/SUBQ PORT AGE 5 YR/> N/A 5/15/2018    Procedure: INSERTION VENOUS PORT ( PORT-A-CATH) IR;  Surgeon: Sonia Patel DO;  [facility];  Service: Interventional Radiology    TONSILLECTOMY      WHIPPLE PROCEDURE W/ LAPAROSCOPY         Family History:    Family History   Problem Relation Age of Onset    Cancer Father        Cancer-related family history includes Cancer in her father  Social History:   Social History     Social History    Marital status:      Spouse name: N/A    Number of children: N/A    Years of education: N/A     Occupational History    Not on file       Social History Main Topics    Smoking status: Current Every Day Smoker    Smokeless tobacco: Never Used    Alcohol use No    Drug use: Yes     Types: Marijuana      Comment: 1 month    Sexual activity: Not Currently     Other Topics Concern    Not on file     Social History Narrative    Wears seatbelt    No living will    Denies exercise habits    Regular dental care        Current Medications:   Current Outpatient Prescriptions   Medication Sig Dispense Refill    diazepam (VALIUM) 5 mg tablet Take 1 tablet (5 mg total) by mouth every 12 (twelve) hours as needed for anxiety 30 tablet 0    divalproex sodium (DEPAKOTE) 125 mg EC tablet 1 tab in am and 2 tabs at bedtime 90 tablet 3    FLUoxetine (PROzac) 40 MG capsule Take 1 capsule (40 mg total) by mouth daily 30 capsule 3    LORazepam (ATIVAN) 0 5 mg tablet Take 1 tablet (0 5 mg total) by mouth every 8 (eight) hours as needed for anxiety 90 tablet 0    oxyCODONE (ROXICODONE) 5 mg immediate release tablet 1 tab every 4 hours as needed for pain and 2 tabs at bedtime 100 tablet 0    pancrelipase, Lip-Prot-Amyl, (CREON) 12,000 units capsule Take 12,000 units of lipase by mouth 3 (three) times a day with meals for 180 doses 180 capsule 3    pantoprazole (PROTONIX) 40 mg tablet Take 40 mg by mouth daily      capecitabine (XELODA) 500 MG tablet Take 3 tablets (1,500 mg total) by mouth 2 (two) times a day (Patient not taking: Reported on 9/5/2018 ) 180 tablet 2    ibuprofen (MOTRIN) 800 mg tablet Take 1 tablet (800 mg total) by mouth every 8 (eight) hours as needed for moderate pain (Patient not taking: Reported on 9/5/2018 ) 90 tablet 3    levothyroxine 25 mcg tablet Take 25 mcg by mouth daily      senna-docusate sodium (SENOKOT-S) 8 6-50 mg per tablet Take 2 tablets by mouth daily for 30 days 7 tablet 0     No current facility-administered medications for this visit  Allergies: Allergies   Allergen Reactions    Codeine Other (See Comments)     Feels crazy when taking it       Physical Exam:    Body surface area is 1 47 meters squared      Wt Readings from Last 3 Encounters:   09/05/18 48 1 kg (106 lb)   08/28/18 48 8 kg (107 lb 8 oz)   08/24/18 48 9 kg (107 lb 12 9 oz)        Temp Readings from Last 3 Encounters:   09/05/18 98 5 °F (36 9 °C) (Tympanic)   08/24/18 98 °F (36 7 °C) (Tympanic)   08/17/18 98 °F (36 7 °C) (Temporal)        BP Readings from Last 3 Encounters:   09/05/18 112/66   08/28/18 108/60   08/24/18 (!) 100/49         Pulse Readings from Last 3 Encounters:   09/05/18 80   08/28/18 81 08/24/18 92        Physical Exam     Constitutional   General appearance: No acute distress, well appearing and well nourished  Eyes   Conjunctiva and lids: No swelling, erythema or discharge  Pupils and irises: Equal, round and reactive to light  Ears, Nose, Mouth, and Throat   External inspection of ears and nose: Normal     Nasal mucosa, septum, and turbinates: Normal without edema or erythema  Oropharynx: Normal with no erythema, edema, exudate or lesions  Pulmonary   Respiratory effort: No increased work of breathing or signs of respiratory distress  Auscultation of lungs: Clear to auscultation  Cardiovascular   Palpation of heart: Normal PMI, no thrills  Auscultation of heart: Normal rate and rhythm, normal S1 and S2, without murmurs  Examination of extremities for edema and/or varicosities: Normal     Carotid pulses: Normal     Abdomen   Abdomen: Non-tender, no masses  Liver and spleen: No hepatomegaly or splenomegaly  Lymphatic   Palpation of lymph nodes in neck: No lymphadenopathy  Musculoskeletal   Gait and station: Normal     Digits and nails: Normal without clubbing or cyanosis  Inspection/palpation of joints, bones, and muscles: Normal     Skin   Skin and subcutaneous tissue: Normal without rashes or lesions  Neurologic   Cranial nerves: Cranial nerves 2-12 intact  Sensation: No sensory loss  Psychiatric   Orientation to person, place, and time: Normal     Mood and affect: Normal         Assessment / Plan:      The patient is a pleasant 62-year-old female with newly diagnosed localized pancreatic cancer status post resection  She got one cycle of adjuvant chemotherapy in [place] prior to transferring back to the [facility]  I will continue the regimen that she was started on over there  She was apparently getting gemcitabine thousand centimeter squared day 1 and 8 and 15  She is getting xeloda 1500 mg twice a day 2 out of 4 weeks   This comes to approximately 1000 g/m²  The regimen on [facility] and in the [facility] presentation was actually 830 mg meter square twice a day  That regimen was over 3 weeks  Since she had already started therapy we discussed this with her at Her initial visit and since she was doing well she chose to stay on the same regimen Because of concerns for toxicity expressed by her if we changed anything because she had done well with her initial cycle in [place]  Again she has lost her  to cancer recently also and the course he had with his disease also has shaved some of her decisions as far as treatment is concerned  We had decided if she has any toxicity we will change her to the original regimen per the study  She did do well with 5 cycles of chemotherapy I e  5 months of treatment  Her CA-19-9 has been elevated although imaging has not correlated with any evidence of metastatic disease  She does have tiny subcentimeter lesions which per the report did not seem to be metastatic  Regardless I will now referred to our colleagues in radiation oncology to consider adjuvant concurrent chemoradiation  If she does get concurrent chemoradiation my preference would be to give her 5- mg meter squared CIV I per day Monday through Saturday  This would be while she is getting radiation  I will repeat imaging as soon as possible to reevaluate to make sure she does not have any metastatic disease and to relook at the lesions in the liver  If she has no metastatic disease she will proceed with concurrent chemoradiation  If however she has metastatic disease and she does understand treatment would be palliative  Goals and Barriers:  Current Goal:  Prolong Survival from Pancreatic cancer  Barriers: None  Patient's Capacity to Self Care:  Patient able to self care      Portions of the record may have been created with voice recognition software   Occasional wrong word or "sound a like" substitutions may have occurred due to the inherent limitations of voice recognition software   Read the chart carefully and recognize, using context, where substitutions have occurred

## 2018-09-27 NOTE — INTERVAL H&P NOTE
Update: (This section must be completed if the H&P was completed greater than 24 hrs to procedure or admission)    H&P reviewed  After examining the patient, I find no changed to the H&P since it had been written  Patient re-evaluated   Accept as history and physical     Tamera Blackmon MD/September 27, 2018/9:17 AM

## 2018-09-27 NOTE — DISCHARGE INSTRUCTIONS
Percutaneous Liver Biopsy   WHAT YOU NEED TO KNOW:   A PLB is a procedure to remove a sample of tissue from your liver  The sample can be sent to a lab and tested for liver disease, cancer, or infection  After the procedure you may have pain and bruising at the biopsy site  You may also have pain in your right shoulder  These symptoms should get better in 48 to 72 hours  DISCHARGE INSTRUCTIONS:     [facility] patients,  Contact Interventional Radiology at [phone] PATIENTS: Contact Interventional Radiology at [phone]   [facility] PATIENTS: Contact Interventional Radiology at [phone] if:    · Fever greater than 101 or chills  · You have severe pain in your abdomen  · Your abdomen is larger than usual and feels hard  · Your neck is more swollen and you have trouble swallowing  · You feel weak or dizzy  · Your heart is beating faster than usual    · Your pain does not get better after you take pain medicine  · Your wound is red, swollen, or draining pus  · You have nausea or are vomiting  · Your skin is itchy, swollen, or you have a rash  · You have questions or concerns about your condition or care  Medicines:   · Acetaminophen decreases pain and fever  It is available without a doctor's order  Acetaminophen can cause liver damage if not taken correctly  · Take your home medicine as directed  · Resume your normal diet  Small sips of flat soda will help with mild nausea  Self-care:   · Rest as directed  Do not play sports, exercise, or lift anything heavier than 5 pounds for up to 1 week  · Apply firm, steady pressure if bleeding occurs  A small amount of bleeding from your wound is possible  Apply pressure with a clean gauze or towel for 5 to 10 minutes  Call 911 if bleeding becomes heavy or does not stop  · Ask your healthcare provider when to take your blood thinner or antiplatelet medicine   You may need to wait 24 to 72 hours to take your medicine  This will prevent bleeding  Follow up with your healthcare provider as directed: Write down your questions so you remember to ask them during your visits

## 2018-09-28 DIAGNOSIS — C25.2 MALIGNANT NEOPLASM OF TAIL OF PANCREAS (HCC): ICD-10-CM

## 2018-09-28 RX ORDER — FLUOROURACIL 50 MG/ML
600 INJECTION, SOLUTION INTRAVENOUS ONCE
Status: COMPLETED | OUTPATIENT
Start: 2018-10-03 | End: 2018-10-03

## 2018-09-28 RX ORDER — DEXTROSE MONOHYDRATE 50 MG/ML
40 INJECTION, SOLUTION INTRAVENOUS ONCE
Status: COMPLETED | OUTPATIENT
Start: 2018-10-03 | End: 2018-10-03

## 2018-09-28 RX ORDER — SODIUM CHLORIDE 9 MG/ML
40 INJECTION, SOLUTION INTRAVENOUS ONCE
Status: COMPLETED | OUTPATIENT
Start: 2018-10-03 | End: 2018-10-03

## 2018-09-28 RX ORDER — DIAZEPAM 5 MG/1
TABLET ORAL
Qty: 60 TABLET | Refills: 2 | Status: SHIPPED | OUTPATIENT
Start: 2018-09-28

## 2018-10-01 ENCOUNTER — TELEPHONE (OUTPATIENT)
Dept: NEUROSURGERY | Facility: CLINIC | Age: 63
End: 2018-10-01

## 2018-10-01 NOTE — TELEPHONE ENCOUNTER
Pt report her PCP ordered an MRI for her to be completed early in Sept  But or other medical reasons she never had it done  Her family has encouraged her to do it  She has not been seen by this office ut anticipates an appt  In the future  She questions how to schedule the study  Provided her with central schedules number

## 2018-10-02 ENCOUNTER — TELEPHONE (OUTPATIENT)
Dept: HEMATOLOGY ONCOLOGY | Facility: CLINIC | Age: 63
End: 2018-10-02

## 2018-10-02 ENCOUNTER — APPOINTMENT (OUTPATIENT)
Dept: LAB | Facility: HOSPITAL | Age: 63
End: 2018-10-02
Attending: INTERNAL MEDICINE
Payer: COMMERCIAL

## 2018-10-03 ENCOUNTER — HOSPITAL ENCOUNTER (OUTPATIENT)
Dept: INFUSION CENTER | Facility: HOSPITAL | Age: 63
Discharge: HOME | End: 2018-10-03
Payer: COMMERCIAL

## 2018-10-03 VITALS
SYSTOLIC BLOOD PRESSURE: 120 MMHG | DIASTOLIC BLOOD PRESSURE: 60 MMHG | RESPIRATION RATE: 18 BRPM | BODY MASS INDEX: 18.44 KG/M2 | WEIGHT: 104.06 LBS | HEIGHT: 63 IN | TEMPERATURE: 98.7 F | OXYGEN SATURATION: 97 % | HEART RATE: 96 BPM

## 2018-10-03 PROCEDURE — G0498 CHEMO EXTEND IV INFUS W/PUMP: HCPCS

## 2018-10-03 PROCEDURE — 96411 CHEMO IV PUSH ADDL DRUG: CPT

## 2018-10-03 PROCEDURE — 96413 CHEMO IV INFUSION 1 HR: CPT

## 2018-10-03 PROCEDURE — 96368 THER/DIAG CONCURRENT INF: CPT

## 2018-10-03 PROCEDURE — 96367 TX/PROPH/DG ADDL SEQ IV INF: CPT

## 2018-10-03 PROCEDURE — 96415 CHEMO IV INFUSION ADDL HR: CPT

## 2018-10-03 RX ADMIN — SODIUM CHLORIDE 40 ML/HR: 9 INJECTION, SOLUTION INTRAVENOUS at 10:31

## 2018-10-03 RX ADMIN — DEXTROSE 40 ML/HR: 50 INJECTION, SOLUTION INTRAVENOUS at 11:06

## 2018-10-03 RX ADMIN — LEUCOVORIN CALCIUM 600 MG: 200 INJECTION, POWDER, LYOPHILIZED, FOR SOLUTION INTRAMUSCULAR; INTRAVENOUS at 11:15

## 2018-10-03 RX ADMIN — OXALIPLATIN 127 MG: 5 INJECTION, SOLUTION, CONCENTRATE INTRAVENOUS at 11:19

## 2018-10-03 RX ADMIN — FLUOROURACIL 600 MG: 50 INJECTION, SOLUTION INTRAVENOUS at 13:27

## 2018-10-03 RX ADMIN — ONDANSETRON HYDROCHLORIDE: 2 INJECTION, SOLUTION INTRAMUSCULAR; INTRAVENOUS at 10:31

## 2018-10-03 NOTE — PROGRESS NOTES
Pt arrived amb for chemo  WBCs elevated, Dr aware  VSS, pt denies symptoms of infection  Dr informed, Creta Primes may be held this cycle  Port accessed, premeds infused, Oxaliplatin and Leucovorin infusing  Napping at present    TM

## 2018-10-03 NOTE — PROGRESS NOTES
Pt ate lunch, tolerated chemo infusion well  Pt states she has been having a lot of peeling skin on her feet, a bit on her hands  Dr Nakia Puente notified, will hold leucovorin next cycle  5FU push given  CADD connected and running  Res vol = 92mls  Pt instructions reviewed  Disch amb to home, steady gait

## 2018-10-04 ENCOUNTER — TELEPHONE (OUTPATIENT)
Dept: HEMATOLOGY ONCOLOGY | Facility: CLINIC | Age: 63
End: 2018-10-04

## 2018-10-04 NOTE — TELEPHONE ENCOUNTER
THELMA Soto Pt called again asking to speak about disease  Discussed that with liver involvement is not curable but reinforced is treatable  Will continue current treatment as long as effective or SE's intolerable  Then will change treatment  Asked about prognosis for time and informed can not answer that  Have seen several pts live years  Informed will have FU scanning probably 3 months from last scan so December  Pt thought that was a long time  Informed it is standard and need to give treatment time to work to get accurate evaluation  She asked about getting a second opinion and said it's always good to have a second set of eyes check things over  Can discuss at next OV and then we can help her with that  Also getting a second opinion doesn't have to mean leaving this practice  Her eventual goal is to move to [place] where her family is as she is alone here  Verbalized understanding of conversation and appreciative ot time spent talking

## 2018-10-05 ENCOUNTER — HOSPITAL ENCOUNTER (OUTPATIENT)
Dept: INFUSION CENTER | Facility: HOSPITAL | Age: 63
Discharge: HOME | End: 2018-10-05
Payer: COMMERCIAL

## 2018-10-05 RX ADMIN — Medication 300 UNITS: at 11:50

## 2018-10-05 NOTE — PROGRESS NOTES
Pt in infusion center today for 5FU CADD disconnect  ResVol ZERO patient tolerated port and cadd deaccess with no adverse reactions  Pt then d/c'd home ambulatory with steady gait

## 2018-10-08 ENCOUNTER — TELEPHONE (OUTPATIENT)
Dept: HEMATOLOGY ONCOLOGY | Facility: HOSPITAL | Age: 63
End: 2018-10-08

## 2018-10-08 NOTE — TELEPHONE ENCOUNTER
I spoke with patients daughter who is on the hippa form and explained diagnosis, prognosis and treatment plan according to Dr Lazaro Vera note  This will be faxed to her as patient is looking for a 2nd opinion

## 2018-10-08 NOTE — TELEPHONE ENCOUNTER
Patient daughter called if you can call her to explain her mother diagnosis, her mom sounds confused

## 2018-10-10 ENCOUNTER — TELEPHONE (OUTPATIENT)
Dept: HEMATOLOGY ONCOLOGY | Facility: CLINIC | Age: 63
End: 2018-10-10

## 2018-10-10 NOTE — TELEPHONE ENCOUNTER
Pt's daughter called and indicated you needed a fax number to send her H & P and chart notes    Fax number is [phone]

## 2018-10-12 RX ORDER — DEXTROSE MONOHYDRATE 50 MG/ML
20 INJECTION, SOLUTION INTRAVENOUS CONTINUOUS
Status: DISCONTINUED | OUTPATIENT
Start: 2018-10-16 | End: 2018-10-19 | Stop reason: HOSPADM

## 2018-10-12 RX ORDER — FLUOROURACIL 50 MG/ML
596 INJECTION, SOLUTION INTRAVENOUS ONCE
Status: DISCONTINUED | OUTPATIENT
Start: 2018-10-16 | End: 2018-10-15

## 2018-10-12 RX ORDER — SODIUM CHLORIDE 9 MG/ML
20 INJECTION, SOLUTION INTRAVENOUS CONTINUOUS
Status: DISCONTINUED | OUTPATIENT
Start: 2018-10-16 | End: 2018-10-19 | Stop reason: HOSPADM

## 2018-10-15 ENCOUNTER — APPOINTMENT (OUTPATIENT)
Dept: LAB | Facility: HOSPITAL | Age: 63
End: 2018-10-15
Attending: INTERNAL MEDICINE
Payer: COMMERCIAL

## 2018-10-15 ENCOUNTER — OFFICE VISIT (OUTPATIENT)
Dept: HEMATOLOGY ONCOLOGY | Facility: HOSPITAL | Age: 63
End: 2018-10-15
Payer: COMMERCIAL

## 2018-10-15 VITALS
HEIGHT: 62 IN | OXYGEN SATURATION: 95 % | HEART RATE: 108 BPM | WEIGHT: 104 LBS | SYSTOLIC BLOOD PRESSURE: 114 MMHG | RESPIRATION RATE: 16 BRPM | BODY MASS INDEX: 19.14 KG/M2 | DIASTOLIC BLOOD PRESSURE: 64 MMHG | TEMPERATURE: 97.7 F

## 2018-10-15 DIAGNOSIS — C25.2 MALIGNANT NEOPLASM OF TAIL OF PANCREAS (HCC): Primary | ICD-10-CM

## 2018-10-15 DIAGNOSIS — C25.9 PANCREATIC CARCINOMA (HCC): ICD-10-CM

## 2018-10-15 LAB
ALBUMIN SERPL BCP-MCNC: 3.6 G/DL (ref 3.5–5)
ALP SERPL-CCNC: 124 U/L (ref 46–116)
ALT SERPL W P-5'-P-CCNC: 28 U/L (ref 12–78)
ANION GAP SERPL CALCULATED.3IONS-SCNC: 10 MMOL/L (ref 4–13)
AST SERPL W P-5'-P-CCNC: 26 U/L (ref 5–45)
BASOPHILS # BLD AUTO: 0.09 THOUSANDS/ΜL (ref 0–0.1)
BASOPHILS NFR BLD AUTO: 1 % (ref 0–1)
BILIRUB SERPL-MCNC: 0.2 MG/DL (ref 0.2–1)
BUN SERPL-MCNC: 14 MG/DL (ref 5–25)
CALCIUM SERPL-MCNC: 9.4 MG/DL (ref 8.3–10.1)
CHLORIDE SERPL-SCNC: 102 MMOL/L (ref 100–108)
CO2 SERPL-SCNC: 28 MMOL/L (ref 21–32)
CREAT SERPL-MCNC: 0.64 MG/DL (ref 0.6–1.3)
EOSINOPHIL # BLD AUTO: 0.21 THOUSAND/ΜL (ref 0–0.61)
EOSINOPHIL NFR BLD AUTO: 2 % (ref 0–6)
ERYTHROCYTE [DISTWIDTH] IN BLOOD BY AUTOMATED COUNT: 17.2 % (ref 11.6–15.1)
GFR SERPL CREATININE-BSD FRML MDRD: 97 ML/MIN/1.73SQ M
GLUCOSE SERPL-MCNC: 99 MG/DL (ref 65–140)
HCT VFR BLD AUTO: 34.4 % (ref 34.8–46.1)
HGB BLD-MCNC: 11.8 G/DL (ref 11.5–15.4)
IMM GRANULOCYTES # BLD AUTO: 0.05 THOUSAND/UL (ref 0–0.2)
IMM GRANULOCYTES NFR BLD AUTO: 1 % (ref 0–2)
LYMPHOCYTES # BLD AUTO: 2.19 THOUSANDS/ΜL (ref 0.6–4.47)
LYMPHOCYTES NFR BLD AUTO: 25 % (ref 14–44)
MCH RBC QN AUTO: 40.1 PG (ref 26.8–34.3)
MCHC RBC AUTO-ENTMCNC: 34.3 G/DL (ref 31.4–37.4)
MCV RBC AUTO: 117 FL (ref 82–98)
MONOCYTES # BLD AUTO: 1.95 THOUSAND/ΜL (ref 0.17–1.22)
MONOCYTES NFR BLD AUTO: 22 % (ref 4–12)
NEUTROPHILS # BLD AUTO: 4.22 THOUSANDS/ΜL (ref 1.85–7.62)
NEUTS SEG NFR BLD AUTO: 49 % (ref 43–75)
NRBC BLD AUTO-RTO: 0 /100 WBCS
PLATELET # BLD AUTO: 184 THOUSANDS/UL (ref 149–390)
PMV BLD AUTO: 10.9 FL (ref 8.9–12.7)
POTASSIUM SERPL-SCNC: 4.1 MMOL/L (ref 3.5–5.3)
PROT SERPL-MCNC: 7.5 G/DL (ref 6.4–8.2)
RBC # BLD AUTO: 2.94 MILLION/UL (ref 3.81–5.12)
SODIUM SERPL-SCNC: 140 MMOL/L (ref 136–145)
WBC # BLD AUTO: 8.71 THOUSAND/UL (ref 4.31–10.16)

## 2018-10-15 PROCEDURE — 80053 COMPREHEN METABOLIC PANEL: CPT | Performed by: INTERNAL MEDICINE

## 2018-10-15 PROCEDURE — 99215 OFFICE O/P EST HI 40 MIN: CPT | Performed by: INTERNAL MEDICINE

## 2018-10-15 PROCEDURE — 85025 COMPLETE CBC W/AUTO DIFF WBC: CPT | Performed by: INTERNAL MEDICINE

## 2018-10-15 PROCEDURE — 36415 COLL VENOUS BLD VENIPUNCTURE: CPT | Performed by: INTERNAL MEDICINE

## 2018-10-15 PROCEDURE — 86301 IMMUNOASSAY TUMOR CA 19-9: CPT | Performed by: INTERNAL MEDICINE

## 2018-10-15 RX ORDER — IBUPROFEN 800 MG/1
TABLET ORAL
COMMUNITY
Start: 2018-10-02

## 2018-10-15 NOTE — PROGRESS NOTES
Hematology/Oncology Outpatient Follow- up Note  Pete Wilkinson 65 y o  female MRN: @ Encounter: [id number]        Date:  10/15/2018    Presenting Complaint/Diagnosis : T2 N0 M0 pancreatic cancer status post resection who was found to have a liver metastases on a CT scan done in September 2018  HPI:    The patient was  seen for initial consultation 5/4/2018 regarding A newly resected pancreatic cancer  She was seen here at [place] [address] by our colleagues in surgical oncology  They were planning a resection of a pancreatic mass which was biopsy-proven to be adenocarcinoma  The patient had no family or support system here in the [facility] area so she decided to go to [place] where she has family to get her resection  She had her resection done and was found to have a T2 N0 M0 malignancy  Since she had no lymph nodes involved and was decided to give her 6 months of adjuvant chemotherapy with gemcitabine and Xeloda  From what she tells me she has been on Xeloda 1500 mg twice a day and gemcitabine thousand milligrams per meter square day 1 and 8 and 15  Xeloda is 2 out of 4 weeks  The gemcitabine was 3 out of 4 weeks  She got one cycle and then moved back home to [facility] And started the rest of her adjuvant treatment here  She was found to have metastatic disease in September 2018  Previous Hematologic/ Oncologic History:       Malignant neoplasm of tail of pancreas ([address] )    1/8/2018 Initial Diagnosis     Malignant neoplasm of tail of pancreas [address] [address] )       1/16/2018 Biopsy     Pancreas, tail (fine needle aspiration and cell block preparations):     - Malignant (PSC Category VI)   - Findings suggest moderately differentiated adenocarcinoma  2/22/2018 Surgery     Distal pancreatectomy with splenectomy done in [place]  3 7 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 and invades peripancreatic soft tissue    pT2, pN0          Chemotherapy     gemcitabine and Xeloda started in [place]  Second cycle initiated on 5/18/18          Surgery    Adjuvant chemotherapy with Gemcitabine and Xeloda  Xeloda is 1500 mg twice a day and gemcitabine thousand milligrams per meter square day 1 and 8 and 15  Xeloda is 2 out of 4 weeks  The gemcitabine was 3 out of 4 weeks  This is the exact regimen and doses she was on in [place] and she got one cycle there  She then received 4 cycles and our hospital Last one having started on August 10  Current Hematologic/ Oncologic Treatment:      5-FU 2400 mg meter square, 5- mg meter square, leucovorin 400 mg meter square, oxaliplatin at 85 mg meter square Every 2 weeks with Neulasta growth factor support  Was started on September 19, 2018  Dose #3 is on 16 October  Interval History:      The patient is an unfortunate 71-year-old female with stage IV pancreatic cancer  She returns for follow-up visit  She is tolerating her chemotherapy reasonably well  She does have peeling of her feet so I will discontinue the 5-FU bolus  The leucovorin bolus has already been discontinued  I suspect some of this may be related to the Xeloda she was on previously  She wishes to have a conference call with her daughter and we had this today  We discussed her symptoms, prognosis, moving back to [place] with her family on the phone  I had a 1 hour discussion with the patient and her daughter  As far as symptoms are concerned we discussed the peeling of her feet  This is slowly improving  She does get fatigue  Also feels a little foggy after the chemotherapy  Denies any nausea or vomiting  States her symptoms to wax and wane and sometimes she does feel good  The rest of her 14 point review of systems today was negative          Test Results:    Imaging: Ir Image Guided Biopsy/aspiration Liver    Result Date: 9/27/2018  Narrative: Ultrasound-guided liver mass biopsy Clinical History: Baker cancer status post Whipple procedure with increased tumor markings and a CT scan showing a small left lobe liver mass increasing in size suspicious for metastatic disease  Conscious sedation time: 41 minutes Technique: The patient was brought to the interventional radiology area and placed supine on the stretcher  Prior imaging studies were reviewed  After a brief survey of the region of interest with ultrasound, a site on the skin was marked, prepped, and  draped in the usual sterile fashion  Lidocaine was administered to the skin and a small skin incision was made  A 17-gauge cannula was placed percutaneously into the left lobe liver mass under direct ultrasound guidance  Through this cannula, 4 passes were made with an 18 gauge coring needle  The specimen was sent to the lab in formalin  The patient tolerated the procedure well and suffered no complications  Impression: Impression: Successful ultrasound guided core biopsy of the left lobe liver mass which measures approximately 1 7 x 1  cm by ultrasound which is similar to the measurement on CT scan  Workstation performed: [id number]       Labs:   Lab Results   Component Value Date    WBC 34 46 (HH) 10/02/2018    HGB 11 3 (L) 10/02/2018    HCT 33 1 (L) 10/02/2018     (H) 10/02/2018     10/02/2018     Lab Results   Component Value Date     (L) 10/02/2018    K 4 2 10/02/2018    CL 99 (L) 10/02/2018    CO2 30 10/02/2018    ANIONGAP 8 10/26/2015    BUN 10 10/02/2018    CREATININE 0 62 10/02/2018    GLUCOSE 88 10/26/2015    GLUF 97 09/19/2018    CALCIUM 9 0 10/02/2018    AST 16 10/02/2018    ALT 17 10/02/2018    ALKPHOS 191 (H) 10/02/2018    PROT 7 6 10/26/2015    BILITOT 0 38 10/26/2015    EGFR 98 10/02/2018       Lab Results   Component Value Date    BQIDDKEE02 338 06/20/2016         ROS: As stated in the history of present illness otherwise his 14 point review of systems today was negative        Active Problems:   Patient Active Problem List   Diagnosis    Malignant neoplasm of tail of [address] [facility] [address] [facility]    Psychiatric disorder  Malignant cachexia ([facility] )    Anxiety about health    Depression    Unintended weight loss    Chronic low back pain with bilateral sciatica    Health care maintenance    Lumbar disc disease with radiculopathy       Past Medical History:   Past Medical History:   Diagnosis Date    Anxiety     [facility]     Chronic pain disorder     back    Disease of thyroid gland     GERD (gastroesophageal reflux disease)     Pancreatic mass     Psychiatric disorder     anxiety       Surgical History:   Past Surgical History:   Procedure Laterality Date    APPENDECTOMY      BACK SURGERY      CHOLECYSTECTOMY      IR IMAGE GUIDED BIOPSY/ASPIRATION LIVER  9/27/2018    LEG SURGERY      right and left  osteo chondroma removed    LINEAR ENDOSCOPIC U/S N/A 1/16/2018    Procedure: LINEAR ENDOSCOPIC U/S;  Surgeon: Chucky Rodriguez MD;  Location: [facility]; Service: Gastroenterology    MD INSJ TUNNELED CTR VAD W/SUBQ PORT AGE 5 YR/> N/A 5/15/2018    Procedure: INSERTION VENOUS PORT ( PORT-A-CATH) IR;  Surgeon: Casper Rendon DO;  [facility]: [facility];  Service: Interventional Radiology    TONSILLECTOMY      WHIPPLE PROCEDURE W/ LAPAROSCOPY         Family History:    Family History   Problem Relation Age of Onset    Cancer Father        Cancer-related family history includes Cancer in her father  Social History:   Social History     Social History    Marital status:      Spouse name: N/A    Number of children: N/A    Years of education: N/A     Occupational History    Not on file       Social History Main Topics    Smoking status: Current Every Day Smoker     Packs/day: 0 25    Smokeless tobacco: Never Used      Comment: trying to quit    Alcohol use No    Drug use: Yes     Types: Marijuana      Comment: 1 month    Sexual activity: Not Currently     Other Topics Concern    Not on file     Social History Narrative    Wears seatbelt    No living will    Denies exercise habits    Regular dental care Current Medications:   Current Outpatient Prescriptions   Medication Sig Dispense Refill    diazepam (VALIUM) 5 mg tablet Take 1 tablet (5 mg total) by mouth every 12 (twelve) hours as needed for anxiety 30 tablet 0    diazepam (VALIUM) 5 mg tablet TAKE ONE TABLET BY MOUTH EVERY TWELVE HOURS AS NEEDED FOR anxiety 60 tablet 2    divalproex sodium (DEPAKOTE) 125 mg EC tablet 1 tab in am and 2 tabs at bedtime 90 tablet 3    FLUoxetine (PROzac) 40 MG capsule Take 1 capsule (40 mg total) by mouth daily 30 capsule 3    ibuprofen (MOTRIN) 800 mg tablet       LORazepam (ATIVAN) 0 5 mg tablet Take 1 tablet (0 5 mg total) by mouth every 8 (eight) hours as needed for anxiety 90 tablet 0    oxyCODONE (ROXICODONE) 5 mg immediate release tablet 1 tab every 4 hours as needed for pain and 2 tabs at bedtime 100 tablet 0    prochlorperazine (COMPAZINE) 10 mg tablet Take 1 tablet (10 mg total) by mouth every 6 (six) hours as needed for nausea or vomiting 30 tablet 1    pancrelipase, Lip-Prot-Amyl, (CREON) 12,000 units capsule Take 12,000 units of lipase by mouth 3 (three) times a day with meals for 180 doses 180 capsule 3     No current facility-administered medications for this visit        Facility-Administered Medications Ordered in Other Visits   Medication Dose Route Frequency Provider Last Rate Last Dose    [START ON 10/16/2018] alteplase (CATHFLO) injection 2 mg  2 mg Intracatheter PRN MD Sergei Little [START ON 10/16/2018] dextrose 5 % infusion  20 mL/hr Intravenous Continuous MD Sergei Little ON 10/16/2018] fluorouracil (ADRUCIL) injection 596 mg  596 mg Intravenous Once MD Sergei Little ON 10/16/2018] heparin lock flush 100 units/mL injection 300 Units  300 Units Intracatheter Q1H PRN MD Sergei Little [START ON 10/16/2018] ondansetron (ZOFRAN) 16 mg, dexamethasone (DECADRON) 10 mg in sodium chloride 0 9 % 50 mL IVPB   Intravenous Once Jose C Hoyos MD        [START ON 10/16/2018] oxaliplatin (ELOXATIN) 127 mg in dextrose 5 % 250 mL chemo infusion  127 mg Intravenous Once MD Blake Simon Tiago Mccullough ON 10/16/2018] sodium chloride 0 9 % infusion  20 mL/hr Intravenous Continuous Scott Amor MD           Allergies: Allergies   Allergen Reactions    Codeine Other (See Comments)     Feels crazy when taking it       Physical Exam:    Body surface area is 1 45 meters squared  Wt Readings from Last 3 Encounters:   10/15/18 47 2 kg (104 lb)   10/03/18 47 2 kg (104 lb 0 9 oz)   09/27/18 47 2 kg (104 lb)        Temp Readings from Last 3 Encounters:   10/15/18 97 7 °F (36 5 °C) (Tympanic)   10/03/18 98 7 °F (37 1 °C) (Tympanic)   09/27/18 97 9 °F (36 6 °C) (Oral)        BP Readings from Last 3 Encounters:   10/15/18 114/64   10/03/18 120/60   09/27/18 129/60         Pulse Readings from Last 3 Encounters:   10/15/18 (!) 108   10/03/18 96   09/27/18 72         Physical Exam     Constitutional   General appearance: No acute distress, well appearing and well nourished  Eyes   Conjunctiva and lids: No swelling, erythema or discharge  Pupils and irises: Equal, round and reactive to light  Ears, Nose, Mouth, and Throat   External inspection of ears and nose: Normal     Nasal mucosa, septum, and turbinates: Normal without edema or erythema  Oropharynx: Normal with no erythema, edema, exudate or lesions  Pulmonary   Respiratory effort: No increased work of breathing or signs of respiratory distress  Auscultation of lungs: Clear to auscultation  Cardiovascular   Palpation of heart: Normal PMI, no thrills  Auscultation of heart: Normal rate and rhythm, normal S1 and S2, without murmurs  Examination of extremities for edema and/or varicosities: Normal     Carotid pulses: Normal     Abdomen   Abdomen: Non-tender, no masses  Liver and spleen: No hepatomegaly or splenomegaly  Lymphatic   Palpation of lymph nodes in neck: No lymphadenopathy      Musculoskeletal   Gait and station: Normal     Digits and nails: Normal without clubbing or cyanosis  Inspection/palpation of joints, bones, and muscles: Normal     Skin   Skin and subcutaneous tissue: Normal without rashes or lesions  Neurologic   Cranial nerves: Cranial nerves 2-12 intact  Sensation: No sensory loss  Psychiatric   Orientation to person, place, and time: Normal     Mood and affect: Normal         Assessment / Plan:    The patient is a pleasant 60-year-old female who had a resection and while on adjuvant chemotherapy with Xeloda and gemcitabine developed a rising CA-19-9 which eventually led to the diagnosis of her liver metastases which was biopsy-proven  I put her on modified FOLFOX 6 and we discontinued the leucovorin bolus because of side effects  She still has some peeling of the feet so I will discontinue leucovorin bolus also  I advised her she needs to communicate with us about side effects and we can adjust chemotherapy accordingly  I also had a one hour conference call with the patient and her daughter going over prognosis, future plans, the possibility of her moving to be close to family in [place]  We also discussed symptom management  We will proceed with a dose reductions and continue to monitor her CA-19-9  She is thinking of moving to [place] and she will work on this  I'll see her back in a month  Until then if she has any questions she will call our office  All of the patient's and her daughter's questions were answered to their satisfaction  We will Proceed but will discontinue the 5-FU bolus and the Neulasta also  Goals and Barriers:  Current Goal:  Prolong Survival from Pancreatic cancer  Barriers: None  Patient's Capacity to Self Care:  Patient  able to self care      Portions of the record may have been created with voice recognition software   Occasional wrong word or "sound a like" substitutions may have occurred due to the inherent limitations of voice recognition software   Read the chart carefully and recognize, using context, where substitutions have occurred

## 2018-10-16 ENCOUNTER — HOSPITAL ENCOUNTER (OUTPATIENT)
Dept: INFUSION CENTER | Facility: HOSPITAL | Age: 63
Discharge: HOME | End: 2018-10-16
Payer: COMMERCIAL

## 2018-10-16 ENCOUNTER — OFFICE VISIT (OUTPATIENT)
Dept: PALLIATIVE MEDICINE | Facility: CLINIC | Age: 63
End: 2018-10-16
Payer: COMMERCIAL

## 2018-10-16 VITALS
HEART RATE: 96 BPM | BODY MASS INDEX: 19.11 KG/M2 | DIASTOLIC BLOOD PRESSURE: 55 MMHG | SYSTOLIC BLOOD PRESSURE: 114 MMHG | OXYGEN SATURATION: 96 % | TEMPERATURE: 96 F | WEIGHT: 103.84 LBS | RESPIRATION RATE: 18 BRPM | HEIGHT: 62 IN

## 2018-10-16 DIAGNOSIS — G89.29 CHRONIC BILATERAL LOW BACK PAIN WITH BILATERAL SCIATICA: ICD-10-CM

## 2018-10-16 DIAGNOSIS — F32.A DEPRESSION, UNSPECIFIED DEPRESSION TYPE: ICD-10-CM

## 2018-10-16 DIAGNOSIS — C25.2 MALIGNANT NEOPLASM OF TAIL OF PANCREAS (HCC): Primary | ICD-10-CM

## 2018-10-16 DIAGNOSIS — M54.42 CHRONIC BILATERAL LOW BACK PAIN WITH BILATERAL SCIATICA: ICD-10-CM

## 2018-10-16 DIAGNOSIS — F32.9 REACTIVE DEPRESSION: ICD-10-CM

## 2018-10-16 DIAGNOSIS — M26.609 TMJ (TEMPOROMANDIBULAR JOINT SYNDROME): ICD-10-CM

## 2018-10-16 DIAGNOSIS — F41.8 ANXIETY ABOUT HEALTH: ICD-10-CM

## 2018-10-16 DIAGNOSIS — M54.41 CHRONIC BILATERAL LOW BACK PAIN WITH BILATERAL SCIATICA: ICD-10-CM

## 2018-10-16 LAB — CANCER AG19-9 SERPL-ACNC: 949 U/ML (ref 0–35)

## 2018-10-16 PROCEDURE — 99214 OFFICE O/P EST MOD 30 MIN: CPT | Performed by: FAMILY MEDICINE

## 2018-10-16 PROCEDURE — 96413 CHEMO IV INFUSION 1 HR: CPT

## 2018-10-16 PROCEDURE — 96415 CHEMO IV INFUSION ADDL HR: CPT

## 2018-10-16 PROCEDURE — 96367 TX/PROPH/DG ADDL SEQ IV INF: CPT

## 2018-10-16 PROCEDURE — G0498 CHEMO EXTEND IV INFUS W/PUMP: HCPCS

## 2018-10-16 PROCEDURE — 96372 THER/PROPH/DIAG INJ SC/IM: CPT

## 2018-10-16 RX ORDER — CYANOCOBALAMIN 1000 UG/ML
1000 INJECTION INTRAMUSCULAR; SUBCUTANEOUS ONCE
Status: COMPLETED | OUTPATIENT
Start: 2018-10-16 | End: 2018-10-16

## 2018-10-16 RX ORDER — LORAZEPAM 0.5 MG/1
0.5 TABLET ORAL EVERY 8 HOURS PRN
Qty: 90 TABLET | Refills: 3 | Status: SHIPPED | OUTPATIENT
Start: 2018-10-16

## 2018-10-16 RX ORDER — FLUOXETINE HYDROCHLORIDE 40 MG/1
40 CAPSULE ORAL DAILY
Qty: 30 CAPSULE | Refills: 3 | Status: SHIPPED | OUTPATIENT
Start: 2018-10-16

## 2018-10-16 RX ORDER — DIVALPROEX SODIUM 125 MG/1
TABLET, DELAYED RELEASE ORAL
Qty: 90 TABLET | Refills: 3 | Status: SHIPPED | OUTPATIENT
Start: 2018-10-16

## 2018-10-16 RX ADMIN — OXALIPLATIN 127 MG: 5 INJECTION, SOLUTION, CONCENTRATE INTRAVENOUS at 09:24

## 2018-10-16 RX ADMIN — CYANOCOBALAMIN 1000 MCG: 1000 INJECTION, SOLUTION INTRAMUSCULAR at 11:47

## 2018-10-16 RX ADMIN — DEXTROSE 20 ML/HR: 50 INJECTION, SOLUTION INTRAVENOUS at 09:13

## 2018-10-16 RX ADMIN — SODIUM CHLORIDE 20 ML/HR: 9 INJECTION, SOLUTION INTRAVENOUS at 08:49

## 2018-10-16 RX ADMIN — DEXAMETHASONE SODIUM PHOSPHATE: 10 INJECTION, SOLUTION INTRAMUSCULAR; INTRAVENOUS at 08:49

## 2018-10-16 NOTE — SOCIAL WORK
Met with pt during treatment today  Pt states she had discussion with MDs and she has a life expectation of approximately 4-12 months  Lengthy conversation re: emotions, options, plans  Pt's daughters and mother are anxious for her to move back to [place]  She has decisions to make about treatment, finances, living arrangements  Discussed coping methods, prioritizing, alleviating anxiety  MSW will continue to be supportive and provide assistance with resources  Exploring funding options for car repair, and following up on applications for health care coverage when employee-sponsored plan terminates

## 2018-10-16 NOTE — PROGRESS NOTES
Pt in infusion center today for chemotherapy infusion  VSS  Brisk blood return noted from left CW port  Pt offers no complaints at this time

## 2018-10-16 NOTE — PROGRESS NOTES
Pt tolerated treatment today with no adverse reactions  Cadd pump connected  Settings as follows: rate 2ml/hr, residula volume 92ml, LL2  All connections taped in open position  Green light blinking and pump is running  Pt then left unit ambulatory with a steady gait

## 2018-10-16 NOTE — PROGRESS NOTES
Assessment/Plan:    No problem-specific Assessment & Plan notes found for this encounter  we discussed her prognosis and goals  She wants to be with family and will be working to move to [place] with her daughter  She may seek treatment options in [place]  She understands the terminal nature of her cancer  She has no pain  Medications refilled electronically  Diagnoses and all orders for this visit:    Malignant neoplasm of tail of pancreas (HCC)    Chronic bilateral low back pain with bilateral sciatica    Anxiety about health  -     divalproex sodium (DEPAKOTE) 125 mg EC tablet; 1 tab in am and 2 tabs at bedtime  -     LORazepam (ATIVAN) 0 5 mg tablet; Take 1 tablet (0 5 mg total) by mouth every 8 (eight) hours as needed for anxiety    Reactive depression  -     divalproex sodium (DEPAKOTE) 125 mg EC tablet; 1 tab in am and 2 tabs at bedtime    TMJ (temporomandibular joint syndrome)  -     divalproex sodium (DEPAKOTE) 125 mg EC tablet; 1 tab in am and 2 tabs at bedtime    Depression, unspecified depression type  -     FLUoxetine (PROzac) 40 MG capsule; Take 1 capsule (40 mg total) by mouth daily          Subjective:      Patient ID: Marcellus Powell is a 65 y o  female  Fede Ortega returns in outpatient follow-up  PDMP query shows no concerns  She is seen with a friend  Her CA 19-9 has markedly elevated to over 500 and her CT shows liver mets consistent with her pancreatic cancer  She has developed side effects from her chemotherapy and it is being dose reduced  She verbalizes understanding the non-curable nature of her condition  She wants to spend time with family and her friend  She is working to return to [place] to be with her daughter  We discussed treatment options and the idea of a cost benefit assessment of any considered treatments including clinical trials  She will explore options in [place]  Pain minimal and she is not using her oxycodone except on rare occasions   Her weight is stable for now but her appetite has decreased  She tires more easily  The following portions of the patient's history were reviewed and updated as appropriate: allergies, current medications, past family history, past medical history, past social history, past surgical history and problem list     Review of Systems   Constitutional: Positive for activity change, appetite change and fatigue  Negative for chills, diaphoresis, fever and unexpected weight change  HENT: Negative  Eyes: Negative  Respiratory: Positive for shortness of breath  Cardiovascular: Negative  Gastrointestinal: Positive for nausea  Endocrine: Negative  Genitourinary: Negative  Musculoskeletal: Negative  Skin: Negative  Allergic/Immunologic: Negative  Neurological: Negative  Hematological: Negative  Psychiatric/Behavioral: Negative  Objective:      LMP  (LMP Unknown)          Physical Exam   Constitutional: She is oriented to person, place, and time  She appears well-developed  She appears cachectic  She appears ill  No distress  HENT:   Head: Normocephalic and atraumatic  Right Ear: External ear normal    Left Ear: External ear normal    Nose: Nose normal    Mouth/Throat: Oropharynx is clear and moist  No oropharyngeal exudate  Eyes: Pupils are equal, round, and reactive to light  Conjunctivae and EOM are normal  Right eye exhibits no discharge  Left eye exhibits no discharge  No scleral icterus  Neck: Normal range of motion  Neck supple  No JVD present  No tracheal deviation present  No thyromegaly present  Cardiovascular: Normal rate, regular rhythm, normal heart sounds and intact distal pulses  Exam reveals no gallop and no friction rub  No murmur heard  Pulmonary/Chest: Effort normal and breath sounds normal  No stridor  No respiratory distress  She has no wheezes  She has no rales  She exhibits no tenderness  Abdominal: Soft  Bowel sounds are normal  She exhibits no distension and no mass   There is tenderness  There is guarding  There is no rebound  Musculoskeletal: Normal range of motion  She exhibits no edema, tenderness or deformity  Lymphadenopathy:     She has no cervical adenopathy  Neurological: She is alert and oriented to person, place, and time  She has normal reflexes  She displays normal reflexes  No cranial nerve deficit  She exhibits normal muscle tone  Coordination normal    Skin: Skin is warm and dry  She is not diaphoretic  Psychiatric: Her speech is normal  Judgment and thought content normal  Her mood appears anxious  She is withdrawn  Cognition and memory are normal  She exhibits a depressed mood  Vitals reviewed

## 2018-10-18 ENCOUNTER — HOSPITAL ENCOUNTER (OUTPATIENT)
Dept: INFUSION CENTER | Facility: HOSPITAL | Age: 63
Discharge: HOME | End: 2018-10-18
Payer: COMMERCIAL

## 2018-10-18 RX ADMIN — Medication 300 UNITS: at 13:40

## 2018-10-18 NOTE — SOCIAL WORK
Met with pt during clinic visit today  Provided info for pt to follow up on [employer] Disability application  Funding requests for car repair have been submitted  Pt concerned about lab results  States she was feeling more positive today but is now worried  MSW will continue to follow to provide support and assistance

## 2018-10-18 NOTE — PROGRESS NOTES
Pt arrived amb for CADD d/c  Res vol = 0  Port deaccessed after flushing with Heparin per protocol  Dsd applied  Disch amb to home, steady gait

## 2018-10-24 ENCOUNTER — TELEPHONE (OUTPATIENT)
Dept: HEMATOLOGY ONCOLOGY | Facility: CLINIC | Age: 63
End: 2018-10-24

## 2018-10-24 NOTE — TELEPHONE ENCOUNTER
I discussed arrangements for patient to have treatment and care tranferred to [place]  She plans on making a move on or around 11/15  She will let us know more as the timeframe gets closer

## 2018-10-24 NOTE — TELEPHONE ENCOUNTER
Patient decided to move back home to [place]  But has some questions in regards to a CT scan, setting up treatment there, etc  She would like some input and clarity on some things  Please call back and advise  Thank you

## 2018-10-26 RX ORDER — DEXTROSE MONOHYDRATE 50 MG/ML
20 INJECTION, SOLUTION INTRAVENOUS CONTINUOUS
Status: DISCONTINUED | OUTPATIENT
Start: 2018-10-30 | End: 2018-11-02 | Stop reason: HOSPADM

## 2018-10-26 RX ORDER — SODIUM CHLORIDE 9 MG/ML
20 INJECTION, SOLUTION INTRAVENOUS CONTINUOUS
Status: DISCONTINUED | OUTPATIENT
Start: 2018-10-30 | End: 2018-11-02 | Stop reason: HOSPADM

## 2018-10-27 ENCOUNTER — APPOINTMENT (OUTPATIENT)
Dept: LAB | Facility: HOSPITAL | Age: 63
End: 2018-10-27
Attending: INTERNAL MEDICINE
Payer: COMMERCIAL

## 2018-10-27 DIAGNOSIS — C25.2 MALIGNANT NEOPLASM OF TAIL OF PANCREAS (HCC): ICD-10-CM

## 2018-10-27 PROCEDURE — 86301 IMMUNOASSAY TUMOR CA 19-9: CPT

## 2018-10-28 LAB — CANCER AG19-9 SERPL-ACNC: 1198 U/ML (ref 0–35)

## 2018-10-30 ENCOUNTER — HOSPITAL ENCOUNTER (OUTPATIENT)
Dept: INFUSION CENTER | Facility: HOSPITAL | Age: 63
Discharge: HOME | End: 2018-10-30
Payer: COMMERCIAL

## 2018-10-30 VITALS
SYSTOLIC BLOOD PRESSURE: 101 MMHG | TEMPERATURE: 98.2 F | HEART RATE: 89 BPM | BODY MASS INDEX: 18.95 KG/M2 | OXYGEN SATURATION: 97 % | HEIGHT: 63 IN | DIASTOLIC BLOOD PRESSURE: 56 MMHG | WEIGHT: 106.92 LBS

## 2018-10-30 PROCEDURE — 96413 CHEMO IV INFUSION 1 HR: CPT

## 2018-10-30 PROCEDURE — G0498 CHEMO EXTEND IV INFUS W/PUMP: HCPCS

## 2018-10-30 PROCEDURE — 96367 TX/PROPH/DG ADDL SEQ IV INF: CPT

## 2018-10-30 PROCEDURE — 96415 CHEMO IV INFUSION ADDL HR: CPT

## 2018-10-30 RX ADMIN — DEXTROSE 20 ML/HR: 50 INJECTION, SOLUTION INTRAVENOUS at 11:31

## 2018-10-30 RX ADMIN — SODIUM CHLORIDE 20 ML/HR: 9 INJECTION, SOLUTION INTRAVENOUS at 11:05

## 2018-10-30 RX ADMIN — ONDANSETRON HYDROCHLORIDE: 2 INJECTION, SOLUTION INTRAMUSCULAR; INTRAVENOUS at 11:05

## 2018-10-30 RX ADMIN — OXALIPLATIN 127 MG: 5 INJECTION, SOLUTION, CONCENTRATE INTRAVENOUS at 11:37

## 2018-10-30 NOTE — PROGRESS NOTES
Pt tolerated treatment today with no adverse reactions  Cadd pump connected for 46 hr infusion, settings as follows: Rate 2ml/hr, volume 92 ml and LL2  All connections taped in open position with green light blinking and running  Pt will return on Thursday for disconnect  Left unit ambulatory with a steady gait

## 2018-10-30 NOTE — PROGRESS NOTES
Notified Yonatan Hidalgo RN of pts making c/o of having neuropathy at the end of her treatment  Pt states that it comes and goes  Pt was asking for Dr for something to relieve the neuropathy  As per BODØ RN pt should follow up with palliative care for the neuropathy  Notified pt of same  Pt then left unit ambulatory with a steady gait

## 2018-11-01 ENCOUNTER — HOSPITAL ENCOUNTER (OUTPATIENT)
Dept: INFUSION CENTER | Facility: HOSPITAL | Age: 63
Discharge: HOME | End: 2018-11-01
Payer: COMMERCIAL

## 2018-11-01 RX ADMIN — Medication 300 UNITS: at 12:24

## 2018-11-01 NOTE — PROGRESS NOTES
Pt here for CADD pump disconnect; pump disconnected per protocol; pt d/c'd home ambulatory with steady gait

## 2018-11-09 RX ORDER — SODIUM CHLORIDE 9 MG/ML
40 INJECTION, SOLUTION INTRAVENOUS ONCE
Status: COMPLETED | OUTPATIENT
Start: 2018-11-13 | End: 2018-11-13

## 2018-11-09 RX ORDER — DEXTROSE MONOHYDRATE 50 MG/ML
40 INJECTION, SOLUTION INTRAVENOUS ONCE
Status: COMPLETED | OUTPATIENT
Start: 2018-11-13 | End: 2018-11-13

## 2018-11-12 ENCOUNTER — OFFICE VISIT (OUTPATIENT)
Dept: HEMATOLOGY ONCOLOGY | Facility: HOSPITAL | Age: 63
End: 2018-11-12

## 2018-11-12 ENCOUNTER — APPOINTMENT (OUTPATIENT)
Dept: LAB | Facility: HOSPITAL | Age: 63
End: 2018-11-12
Attending: INTERNAL MEDICINE

## 2018-11-12 VITALS
RESPIRATION RATE: 18 BRPM | OXYGEN SATURATION: 98 % | HEIGHT: 63 IN | SYSTOLIC BLOOD PRESSURE: 110 MMHG | WEIGHT: 108 LBS | BODY MASS INDEX: 19.14 KG/M2 | DIASTOLIC BLOOD PRESSURE: 60 MMHG | TEMPERATURE: 97.8 F | HEART RATE: 95 BPM

## 2018-11-12 DIAGNOSIS — C25.2 MALIGNANT NEOPLASM OF TAIL OF PANCREAS (HCC): Primary | ICD-10-CM

## 2018-11-12 PROCEDURE — 99214 OFFICE O/P EST MOD 30 MIN: CPT | Performed by: INTERNAL MEDICINE

## 2018-11-12 NOTE — PROGRESS NOTES
Hematology/Oncology Outpatient Follow- up Note  Arleth Calle 65 y o  female MRN: @ Encounter: [id number]        Date:  11/12/2018    Presenting Complaint/Diagnosis :     T2 N0 M0 pancreatic cancer status post resection who was found to have a liver metastases on a CT scan done in September 2018  HPI:    The patient was  seen for initial consultation 5/4/2018 regarding A newly resected pancreatic cancer  She was seen here at [place] [address] by our colleagues in surgical oncology  They were planning a resection of a pancreatic mass which was biopsy-proven to be adenocarcinoma  The patient had no family or support system here in the [facility] area so she decided to go to [place] where she has family to get her resection  She had her resection done and was found to have a T2 N0 M0 malignancy  Since she had no lymph nodes involved and was decided to give her 6 months of adjuvant chemotherapy with gemcitabine and Xeloda  From what she tells me she has been on Xeloda 1500 mg twice a day and gemcitabine thousand milligrams per meter square day 1 and 8 and 15  Xeloda is 2 out of 4 weeks  The gemcitabine was 3 out of 4 weeks  She got one cycle and then moved back home to [facility] And started the rest of her adjuvant treatment here  She was found to have metastatic disease in September 2018  Previous Hematologic/ Oncologic History:       Malignant neoplasm of tail of pancreas ([address] )    1/8/2018 Initial Diagnosis     Malignant neoplasm of tail of pancreas ([facility] [address] )       1/16/2018 Biopsy     Pancreas, tail (fine needle aspiration and cell block preparations):     - Malignant (PSC Category VI)   - Findings suggest moderately differentiated adenocarcinoma  2/22/2018 Surgery     Distal pancreatectomy with splenectomy done in [place]  3 7 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 and invades peripancreatic soft tissue    pT2, pN0          Chemotherapy     gemcitabine and Xeloda started in [place]  Second cycle initiated on 5/18/18        Surgery     Adjuvant chemotherapy with Gemcitabine and Xeloda  Xeloda is 1500 mg twice a day and gemcitabine thousand milligrams per meter square day 1 and 8 and 15  Xeloda is 2 out of 4 weeks  The gemcitabine was 3 out of 4 weeks  This is the exact regimen and doses she was on in [place] and she got one cycle there  She then received 4 cycles and our hospital Last one having started on August 10  Current Hematologic/ Oncologic Treatment:    5-FU 2400 mg meter square, oxaliplatin at 85 mg meter square Every 2 weeks with Neulasta growth factor support  Was started on September 19, 2018  Dose #5 on 13 November  Interval History:      Patient returns for follow-up visit  CA-19-9 is continuing to go up  Initially we wished for her most recent regimen to have a little time before we made in decisions but with the rising CA-19-9 I think she needs imaging  If she does have progression I will add on liposomal CPT-11 and discontinue the oxaliplatin  For symptoms are concerned she does have a decreased appetite  Denies any nausea or vomiting  Occasionally has some mild epigastric discomfort  She is planning on moving to [place] to be near family at the end of this month if and when her insurance Gets sorted out  Apart from that stated above the rest of his 14 point review of systems today was negative  Imaging: No results found      Labs:   Lab Results   Component Value Date    WBC 8 57 11/12/2018    HGB 14 1 11/12/2018    HCT 41 0 11/12/2018     (H) 11/12/2018     11/12/2018     Lab Results   Component Value Date     10/26/2015    K 4 3 11/12/2018     11/12/2018    CO2 30 11/12/2018    ANIONGAP 8 10/26/2015    BUN 13 11/12/2018    CREATININE 0 66 11/12/2018    GLUCOSE 88 10/26/2015    GLUF 90 10/27/2018    CALCIUM 9 3 11/12/2018    AST 17 11/12/2018    ALT 27 11/12/2018    ALKPHOS 119 (H) 11/12/2018    PROT 7 6 10/26/2015    BILITOT 0 38 10/26/2015    EGFR 96 11/12/2018       Lab Results   Component Value Date    ENWLQILZ47 338 06/20/2016         ROS: As stated in the history of present illness otherwise his 14 point review of systems today was negative  Active Problems:   Patient Active Problem List   Diagnosis    Malignant ne[facility] of tail of pancreas [facility] [address] [facility]    Psychiatric disorder    [facility]    Anxiety about health    Depression    Unintended weight loss    Chronic low back pain with bilateral sciatica    Health care maintenance    Lumbar disc disease with radiculopathy       Past Medical History:   Past Medical History:   Diagnosis Date    Anxiety     [facility]     Chronic pain disorder     back    Disease of thyroid gland     GERD (gastroesophageal reflux disease)     Pancreatic mass     Psychiatric disorder     anxiety       Surgical History:   Past Surgical History:   Procedure Laterality Date    APPENDECTOMY      BACK SURGERY      CHOLECYSTECTOMY      IR IMAGE GUIDED BIOPSY/ASPIRATION LIVER  9/27/2018    LEG SURGERY      right and left  osteo chondroma removed    LINEAR ENDOSCOPIC U/S N/A 1/16/2018    Procedure: LINEAR ENDOSCOPIC U/S;  Surgeon: Mehdi Hopkins MD;  [facility]: [facility]; Service: Gastroenterology    AL INSJ TUNNELED CTR VAD W/SUBQ PORT AGE 5 YR/> N/A 5/15/2018    Procedure: INSERTION VENOUS PORT ( PORT-A-CATH) IR;  Surgeon: Miko Lira DO;  [facility]: [facility];  Service: Interventional Radiology    TONSILLECTOMY      WHIPPLE PROCEDURE W/ LAPAROSCOPY         Family History:    Family History   Problem Relation Age of Onset    Cancer Father        Cancer-related family history includes Cancer in her father  Social History:   Social History     Social History    Marital status:      Spouse name: N/A    Number of children: N/A    Years of education: N/A     Occupational History    Not on file       Social History Main Topics    Smoking status: Current Every Day Smoker     Packs/day: 0 25    Smokeless tobacco: Never Used      Comment: trying to quit    Alcohol use No    Drug use: Yes     Types: Marijuana      Comment: 1 month    Sexual activity: Not Currently     Other Topics Concern    Not on file     Social History Narrative    Wears seatbelt    No living will    Denies exercise habits    Regular dental care        Current Medications:   Current Outpatient Prescriptions   Medication Sig Dispense Refill    diazepam (VALIUM) 5 mg tablet Take 1 tablet (5 mg total) by mouth every 12 (twelve) hours as needed for anxiety 30 tablet 0    diazepam (VALIUM) 5 mg tablet TAKE ONE TABLET BY MOUTH EVERY TWELVE HOURS AS NEEDED FOR anxiety 60 tablet 2    divalproex sodium (DEPAKOTE) 125 mg EC tablet 1 tab in am and 2 tabs at bedtime 90 tablet 3    FLUoxetine (PROzac) 40 MG capsule Take 1 capsule (40 mg total) by mouth daily 30 capsule 3    ibuprofen (MOTRIN) 800 mg tablet       LORazepam (ATIVAN) 0 5 mg tablet Take 1 tablet (0 5 mg total) by mouth every 8 (eight) hours as needed for anxiety 90 tablet 3    oxyCODONE (ROXICODONE) 5 mg immediate release tablet 1 tab every 4 hours as needed for pain and 2 tabs at bedtime 100 tablet 0    prochlorperazine (COMPAZINE) 10 mg tablet Take 1 tablet (10 mg total) by mouth every 6 (six) hours as needed for nausea or vomiting 30 tablet 1    pancrelipase, Lip-Prot-Amyl, (CREON) 12,000 units capsule Take 12,000 units of lipase by mouth 3 (three) times a day with meals for 180 doses 180 capsule 3     No current facility-administered medications for this visit        Facility-Administered Medications Ordered in Other Visits   Medication Dose Route Frequency Provider Last Rate Last Dose    [START ON 11/13/2018] alteplase (CATHFLO) injection 2 mg  2 mg Intracatheter PRN MD Dagmar Ceballos [START ON 11/13/2018] dextrose 5 % infusion  40 mL/hr Intravenous Once Nhung Hernandes MD        [START ON 11/13/2018] heparin lock flush 100 units/mL injection 300 Units  300 Units Intracatheter Q1H PRN Scott Amor MD        [START ON 11/13/2018] ondansetron (ZOFRAN) 16 mg, dexamethasone (DECADRON) 10 mg in sodium chloride 0 9 % 50 mL IVPB   Intravenous Once MD Blake Simon ON 11/13/2018] oxaliplatin (ELOXATIN) 127 mg in dextrose 5 % 250 mL chemo infusion  127 mg Intravenous Once MD Blake Simon ON 11/13/2018] sodium chloride 0 9 % infusion  40 mL/hr Intravenous Once Scott Amor MD           Allergies: Allergies   Allergen Reactions    Codeine Other (See Comments)     Feels crazy when taking it       Physical Exam:    Body surface area is 1 49 meters squared  Wt Readings from Last 3 Encounters:   11/12/18 49 kg (108 lb)   10/30/18 48 5 kg (106 lb 14 8 oz)   10/16/18 47 1 kg (103 lb 13 4 oz)        Temp Readings from Last 3 Encounters:   11/12/18 97 8 °F (36 6 °C) (Tympanic)   10/30/18 98 2 °F (36 8 °C) (Tympanic)   10/16/18 (!) 96 °F (35 6 °C) (Tympanic)        BP Readings from Last 3 Encounters:   11/12/18 110/60   10/30/18 101/56   10/16/18 114/55         Pulse Readings from Last 3 Encounters:   11/12/18 95   10/30/18 89   10/16/18 96        Physical Exam     Constitutional   General appearance: No acute distress, well appearing and well nourished  Eyes   Conjunctiva and lids: No swelling, erythema or discharge  Pupils and irises: Equal, round and reactive to light  Ears, Nose, Mouth, and Throat   External inspection of ears and nose: Normal     Nasal mucosa, septum, and turbinates: Normal without edema or erythema  Oropharynx: Normal with no erythema, edema, exudate or lesions  Pulmonary   Respiratory effort: No increased work of breathing or signs of respiratory distress  Auscultation of lungs: Clear to auscultation  Cardiovascular   Palpation of heart: Normal PMI, no thrills  Auscultation of heart: Normal rate and rhythm, normal S1 and S2, without murmurs      Examination of extremities for edema and/or varicosities: Normal     Carotid pulses: Normal     Abdomen   Abdomen: Non-tender, no masses  Liver and spleen: No hepatomegaly or splenomegaly  Lymphatic   Palpation of lymph nodes in neck: No lymphadenopathy  Musculoskeletal   Gait and station: Normal     Digits and nails: Normal without clubbing or cyanosis  Inspection/palpation of joints, bones, and muscles: Normal     Skin   Skin and subcutaneous tissue: Normal without rashes or lesions  Neurologic   Cranial nerves: Cranial nerves 2-12 intact  Sensation: No sensory loss  Psychiatric   Orientation to person, place, and time: Normal     Mood and affect: Normal         Assessment / Plan:      The patient is a pleasant 31-year-old female who had a resection and while on adjuvant chemotherapy with Xeloda and gemcitabine developed a rising CA-19-9 which eventually led to the diagnosis of her liver metastases which was biopsy-proven  I put her on modified FOLFOX 6 and we discontinued the leucovorin bolus because of side effects  5-FU boluses also be discontinued  Her CA-19-9 continues to go up  I am concerned she is very resistant disease  I will do a scan and see her back within the next 5-10 days  If she has progression I will discontinue the oxaliplatin and put her on liposomal CPT-11  See her back with results of her imaging  If she has any questions she will call her office  Goals and Barriers:  Current Goal:  Prolong Survival from Pancreatic cancer  Barriers: None  Patient's Capacity to Self Care:  Patient  able to self care  Portions of the record may have been created with voice recognition software   Occasional wrong word or "sound a like" substitutions may have occurred due to the inherent limitations of voice recognition software   Read the chart carefully and recognize, using context, where substitutions have occurred

## 2018-11-13 ENCOUNTER — HOSPITAL ENCOUNTER (OUTPATIENT)
Dept: INFUSION CENTER | Facility: HOSPITAL | Age: 63
Discharge: HOME | End: 2018-11-13

## 2018-11-13 VITALS
RESPIRATION RATE: 18 BRPM | BODY MASS INDEX: 18.95 KG/M2 | OXYGEN SATURATION: 97 % | DIASTOLIC BLOOD PRESSURE: 63 MMHG | HEIGHT: 63 IN | WEIGHT: 106.92 LBS | SYSTOLIC BLOOD PRESSURE: 107 MMHG | TEMPERATURE: 97.5 F | HEART RATE: 82 BPM

## 2018-11-13 DIAGNOSIS — C25.0 MALIGNANT NEOPLASM OF HEAD OF PANCREAS (HCC): Primary | ICD-10-CM

## 2018-11-13 PROCEDURE — 96372 THER/PROPH/DIAG INJ SC/IM: CPT

## 2018-11-13 PROCEDURE — 96367 TX/PROPH/DG ADDL SEQ IV INF: CPT

## 2018-11-13 PROCEDURE — 96415 CHEMO IV INFUSION ADDL HR: CPT

## 2018-11-13 PROCEDURE — 96413 CHEMO IV INFUSION 1 HR: CPT

## 2018-11-13 PROCEDURE — G0498 CHEMO EXTEND IV INFUS W/PUMP: HCPCS

## 2018-11-13 RX ORDER — CYANOCOBALAMIN 1000 UG/ML
1000 INJECTION INTRAMUSCULAR; SUBCUTANEOUS ONCE
Status: COMPLETED | OUTPATIENT
Start: 2018-11-13 | End: 2018-11-13

## 2018-11-13 RX ADMIN — OXALIPLATIN 127 MG: 5 INJECTION, SOLUTION, CONCENTRATE INTRAVENOUS at 11:22

## 2018-11-13 RX ADMIN — CYANOCOBALAMIN 1000 MCG: 1000 INJECTION, SOLUTION INTRAMUSCULAR at 12:00

## 2018-11-13 RX ADMIN — SODIUM CHLORIDE 40 ML/HR: 9 INJECTION, SOLUTION INTRAVENOUS at 10:44

## 2018-11-13 RX ADMIN — DEXTROSE 40 ML/HR: 50 INJECTION, SOLUTION INTRAVENOUS at 11:12

## 2018-11-13 RX ADMIN — ONDANSETRON HYDROCHLORIDE: 2 INJECTION, SOLUTION INTRAMUSCULAR; INTRAVENOUS at 10:44

## 2018-11-13 NOTE — PROGRESS NOTES
Pt roseanne chemo well  Ate lunch, good appetite  5FU CADD applied to pt  Res vol = 92mls,  Due to be completed @ 1144 Thursday  Instructions reviewed  Disch amb to home, steady gait

## 2018-11-13 NOTE — PROGRESS NOTES
Pt arrived amb for chemo, c/o lower abdominal intermittent pain, level 2 @ present  Made comfortable  Port accessed, NSS to kvo, premeds infusing

## 2018-11-14 RX ORDER — FLUOXETINE 20 MG/1
TABLET, FILM COATED ORAL
Qty: 60 TABLET | Refills: 3 | Status: SHIPPED | OUTPATIENT
Start: 2018-11-14

## 2018-11-15 ENCOUNTER — HOSPITAL ENCOUNTER (OUTPATIENT)
Dept: INFUSION CENTER | Facility: HOSPITAL | Age: 63
Discharge: HOME | End: 2018-11-15
Payer: COMMERCIAL

## 2018-11-15 RX ADMIN — Medication 300 UNITS: at 15:28

## 2018-11-16 ENCOUNTER — HOSPITAL ENCOUNTER (OUTPATIENT)
Dept: CT IMAGING | Facility: CLINIC | Age: 63
End: 2018-11-16
Attending: INTERNAL MEDICINE

## 2018-11-16 ENCOUNTER — TELEPHONE (OUTPATIENT)
Dept: HEMATOLOGY ONCOLOGY | Facility: CLINIC | Age: 63
End: 2018-11-16

## 2018-11-16 ENCOUNTER — HOSPITAL ENCOUNTER (OUTPATIENT)
Dept: CT IMAGING | Facility: HOSPITAL | Age: 63
Discharge: HOME | End: 2018-11-16
Attending: INTERNAL MEDICINE

## 2018-11-16 DIAGNOSIS — C25.2 MALIGNANT NEOPLASM OF TAIL OF PANCREAS (HCC): ICD-10-CM

## 2018-11-16 PROCEDURE — 74177 CT ABD & PELVIS W/CONTRAST: CPT

## 2018-11-16 PROCEDURE — 71260 CT THORAX DX C+: CPT

## 2018-11-16 RX ADMIN — IOHEXOL 100 ML: 350 INJECTION, SOLUTION INTRAVENOUS at 13:52

## 2018-11-16 NOTE — TELEPHONE ENCOUNTER
Patient had CT scan today and nurse who access the port told patient to contact Dr Linda Romero with these symptoms    Patient does have meds for nausea, but what should she do about the rest   Please call [phone]

## 2018-11-16 NOTE — TELEPHONE ENCOUNTER
Patient notes that over the last 3 days she has not been feeling well, and she has been weak  She reports that she has nausea pills, but has not taken any yet  She wishes to just crawl in bed due to the discomfort  She states that she is tender in her abdomen and the pain has gotten substantially worse  She feels that it is way over the top, compared to what she would normally experience with chemotherapy  She reports that her neuropathy is intermittent and basically occurring with cold exposure, which is due to the oxaliplatin  She states she had diarrhea yesterday and also this morning, but this has resolved at this time  She states that she has been advised for the pain to not take ibuprofen  She states that with the liver involvement, they felt it would be more detrimental for her to take this medication  She states that she does have oxycodone, which she has used previously, available to her  She had met with Dr Angeles Solis from [address] [place] [address] on 10/16/2018  She notes she is going to be traveling with her cousin at the end of this month,  moving permanently to [place], where her daughter and rest of her family are living  She does have upcoming visit with Dr Rodrick Serrano on 11/21/2018  She reports she has not had any diarrhea since this morning, so she does not wish to take any Imodium, but is available to her should she need it  Her last treatment was 11/13/2018 with cycle 5 - Modified FOLFOX 6 with oxaliplatin and 5 FU pump  She feels as if things may be worsening in general   She does not wish to go to the emergency department, although she was advised to do so  She does not have follow-up visit with Dr Angeles Solis at this time  We did discuss possibility of palliative care visit prior to her leaving to establish care in [place]  She does not have Oncology appointment set up at this time in [place], due to question of insurance coverage    We discussed that if she has increase in symptoms over the weekend, that she goes to the emergency department for further evaluation  When she arrives in [place], if she does not have care established, and symptoms intensify, she may need to go to the emergency department  They can help facilitate oncology visit  She indicated she will take her anti nausea pill at this time, and advice was for her to take it over the next 2 to 3 days around the clock to get symptoms better under control  Thankfully she has not had any vomiting  Patient verbalized understanding

## 2018-11-20 ENCOUNTER — TELEPHONE (OUTPATIENT)
Dept: PALLIATIVE MEDICINE | Facility: CLINIC | Age: 63
End: 2018-11-20

## 2018-11-20 DIAGNOSIS — G63 NEUROPATHY ASSOCIATED WITH CANCER (HCC): Primary | ICD-10-CM

## 2018-11-20 DIAGNOSIS — C80.1 NEUROPATHY ASSOCIATED WITH CANCER (HCC): Primary | ICD-10-CM

## 2018-11-20 RX ORDER — GABAPENTIN 300 MG/1
300 CAPSULE ORAL
Qty: 30 CAPSULE | Refills: 0 | Status: SHIPPED | OUTPATIENT
Start: 2018-11-20

## 2018-11-20 NOTE — TELEPHONE ENCOUNTER
She can increase her depakote to 2 pills twice a day and you can put her in with me for 12/4  The depakote can help with neuropathy

## 2018-11-21 ENCOUNTER — OFFICE VISIT (OUTPATIENT)
Dept: HEMATOLOGY ONCOLOGY | Facility: HOSPITAL | Age: 63
End: 2018-11-21

## 2018-11-21 VITALS
BODY MASS INDEX: 18.96 KG/M2 | WEIGHT: 107 LBS | HEIGHT: 63 IN | OXYGEN SATURATION: 96 % | TEMPERATURE: 98.4 F | DIASTOLIC BLOOD PRESSURE: 76 MMHG | SYSTOLIC BLOOD PRESSURE: 104 MMHG | HEART RATE: 96 BPM | RESPIRATION RATE: 16 BRPM

## 2018-11-21 DIAGNOSIS — C25.2 MALIGNANT NEOPLASM OF TAIL OF PANCREAS (HCC): Primary | ICD-10-CM

## 2018-11-21 PROCEDURE — 99214 OFFICE O/P EST MOD 30 MIN: CPT | Performed by: INTERNAL MEDICINE

## 2018-11-21 RX ORDER — SODIUM CHLORIDE 9 MG/ML
20 INJECTION, SOLUTION INTRAVENOUS CONTINUOUS
Status: DISPENSED | OUTPATIENT
Start: 2018-11-27 | End: 2018-11-27

## 2018-11-21 NOTE — PROGRESS NOTES
Hematology/Oncology Outpatient Follow- up Note  Jania Carrington 65 y o  female MRN: @ Encounter: [id number]        Date:  11/21/2018    Presenting Complaint/Diagnosis : T2 N0 M0 pancreatic cancer status post resection who was found to have a liver metastases on a CT scan done in September 2018        HPI:    The patient was  seen for initial consultation 5/4/2018 regarding A newly resected pancreatic cancer  She was seen here at [place] [address] by our colleagues in surgical oncology  They were planning a resection of a pancreatic mass which was biopsy-proven to be adenocarcinoma  The patient had no family or support system here in the [facility] area so she decided to go to [place] where she has family to get her resection  She had her resection done and was found to have a T2 N0 M0 malignancy  Since she had no lymph nodes involved and was decided to give her 6 months of adjuvant chemotherapy with gemcitabine and Xeloda  From what she tells me she has been on Xeloda 1500 mg twice a day and gemcitabine thousand milligrams per meter square day 1 and 8 and 15  Xeloda is 2 out of 4 weeks  The gemcitabine was 3 out of 4 weeks  She got one cycle and then moved back home to [facility] And started the rest of her adjuvant treatment here  She was found to have metastatic disease in September 2018  Previous Hematologic/ Oncologic History:       Malignant neoplasm of tail of pancreas ([address] )    1/8/2018 Initial Diagnosis     Malignant neoplasm of tail of pancreas ([facility] [address] )       1/16/2018 Biopsy     Pancreas, tail (fine needle aspiration and cell block preparations):     - Malignant (PSC Category VI)   - Findings suggest moderately differentiated adenocarcinoma  2/22/2018 Surgery     Distal pancreatectomy with splenectomy done in [place]  3 7 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 and invades peripancreatic soft tissue    pT2, pN0          Chemotherapy     gemcitabine and Xeloda started in [place]  Second cycle initiated on 5/18/18          Surgery     Adjuvant chemotherapy with Gemcitabine and Xeloda  Xeloda is 1500 mg twice a day and gemcitabine thousand milligrams per meter square day 1 and 8 and 15  Xeloda is 2 out of 4 weeks  The gemcitabine was 3 out of 4 weeks  This is the exact regimen and doses she was on in [place] and she got one cycle there  She then received 4 cycles and our hospital Last one having started on August 10  She then had progression indicating resistant residual disease  Current Hematologic/ Oncologic Treatment:      5-FU 2400 mg meter square, oxaliplatin at 85 mg meter square Every 2 weeks with Neulasta growth factor support  Was started on September 19, 2018  Dose #5 on 13 November  dose #6 is on 27 November        Interval History:    The patient returns for follow-up visit  His CA-19-9 continues to go up  I imaged her and her liver lesion has grown in size  This has been biopsy-proven to be adenocarcinoma in the past  Based on that she does have progression  Options at this point would include best supportive care or adding on liposomal CPT-11 and discontinuing the oxaliplatin  he is interested in further therapy so I will start her on the liposomal CPT-11  Her 5-FU will stay the same  She is plugged in with our colleagues in palliative care and I have advised her to stay in touch with them and have her medications adjusted accordingly  She is planning to move to [place]  denies any nausea denies any vomiting denies any diarrhea  And does not have an appetite  I suspect this is cachexia related to her malignancy  She does have an appointment at the [address] [place] [address] on 26 November  Her next infusion is on 27 November  Her next infusion is on the 27th  She does have cold sensitivity        Test Results:    Imaging: Ct Chest Abdomen Pelvis W Contrast    Result Date: 11/20/2018  Narrative: CT CHEST, ABDOMEN AND PELVIS WITH IV CONTRAST INDICATION:   C25 2: Malignant neoplasm of tail of pancreas  COMPARISON:  CT chest abdomen pelvis 9/12/2018, CT chest abdomen pelvis 6/28/2018, CT chest 1/26/2018  TECHNIQUE: CT examination of the chest, abdomen and pelvis was performed  Scanning through the abdomen was performed in arterial, venous and delayed phases according a protocol spefically designed to evaluate upper abdominal viscera  Axial, sagittal, and coronal 2D reformatted images were created from the source data and submitted for interpretation  Radiation dose length product (DLP) for this visit:  537 mGy-cm   This examination, like all CT scans performed in the [facility], was performed utilizing techniques to minimize radiation dose exposure, including the use of iterative reconstruction and automated exposure control  IV Contrast:  100 mL of iohexol (OMNIPAQUE) Enteric Contrast: Enteric contrast was administered  FINDINGS: CHEST LUNGS:  3 mm nodule within the right middle lobe is stable dating back to CT of the chest 1/26/2018 (series 5 image 34) sees  No new nodules are identified  There are no focal consolidations, pneumothorax, or tracheal endobronchial lesions  Diffuse emphysema is again seen  PLEURA:  Unremarkable  HEART/GREAT VESSELS:  Unremarkable for patient's age  MEDIASTINUM AND ROME:  Unremarkable  CHEST WALL AND LOWER NECK:   Unremarkable  ABDOMEN LIVER/BILIARY TREE:  Ill-defined hypoattenuating lesion within the left hepatic lobe status post prior biopsy confirming metastatic adenocarcinoma continues to increase in size, now measuring 1 4 cm anterior posterior by 1 8 cm transverse while previously measuring 1 3 x 1 4 respectively  No new lesions are identified  There is stable central biliary ductal dilatation within expected limits given prior cholecystectomy  GALLBLADDER:  Gallbladder is surgically absent  SPLEEN:  There has been prior splenectomy  No suspicious abnormality in the splenectomy space   PANCREAS:  Postsurgical changes of distal pancreatectomy is again noted  There is no recurrent mass within the surgical bed  ADRENAL GLANDS:  Unremarkable  KIDNEYS/URETERS:  Unremarkable  No hydronephrosis  STOMACH AND BOWEL:  Unremarkable  APPENDIX:  No findings to suggest appendicitis  ABDOMINOPELVIC CAVITY:  No ascites or free intraperitoneal air  No lymphadenopathy  VESSELS:  Unremarkable for patient's age  PELVIS REPRODUCTIVE ORGANS:  Unremarkable for patient's age  URINARY BLADDER:  Unremarkable  ABDOMINAL WALL/INGUINAL REGIONS:  Unremarkable  OSSEOUS STRUCTURES:  No acute fracture or destructive osseous lesion  Posterior spinal fusion instrumentation at L4-L5 is grossly stable in appearance  Impression: 1  Stable 3 mm right middle lobe nodule, stable dating back to chest CT 1/26/2018  2   Increase in size of left hepatic metastatic lesion  3   No new metastatic disease within the chest, abdomen, or pelvis  Workstation performed: [id number]       Labs:   Lab Results   Component Value Date    WBC 8 57 11/12/2018    HGB 14 1 11/12/2018    HCT 41 0 11/12/2018     (H) 11/12/2018     11/12/2018     Lab Results   Component Value Date     10/26/2015    K 4 3 11/12/2018     11/12/2018    CO2 30 11/12/2018    ANIONGAP 8 10/26/2015    BUN 13 11/12/2018    CREATININE 0 66 11/12/2018    GLUCOSE 88 10/26/2015    GLUF 90 10/27/2018    CALCIUM 9 3 11/12/2018    AST 17 11/12/2018    ALT 27 11/12/2018    ALKPHOS 119 (H) 11/12/2018    PROT 7 6 10/26/2015    BILITOT 0 38 10/26/2015    EGFR 96 11/12/2018         Lab Results   Component Value Date    [id number] 338 06/20/2016         ROS: As stated in the history of present illness otherwise his 14 point review of systems today was negative        Active Problems:   Patient Active Problem List   Diagnosis    Malignant neoplasm of tail of pancreas ([address] )    Psychiatric disorder    Malignant cachexia ([facility] [address] )    Anxiety about health    Depression    Unintended weight loss    Chronic low back pain with bilateral sciatica    Health care maintenance    Lumbar disc disease with radiculopathy       Past Medical History:   Past Medical History:   Diagnosis Date    Anxiety     Cancer ([address] )     Chronic pain disorder     back    Disease of thyroid gland     GERD (gastroesophageal reflux disease)     Pancreatic mass     Psychiatric disorder     anxiety       Surgical History:   Past Surgical History:   Procedure Laterality Date    APPENDECTOMY      BACK SURGERY      CHOLECYSTECTOMY      IR IMAGE GUIDED BIOPSY/ASPIRATION LIVER  9/27/2018    LEG SURGERY      right and left  osteo chondroma removed    LINEAR ENDOSCOPIC U/S N/A 1/16/2018    Procedure: LINEAR ENDOSCOPIC U/S;  Surgeon: Parisa Burns MD;  Location: [facility]; Service: Gastroenterology    UT INSJ TUNNELED CTR VAD W/SUBQ PORT AGE 5 YR/> N/A 5/15/2018    Procedure: INSERTION VENOUS PORT ( PORT-A-CATH) IR;  Surgeon: Anuj Fleming DO;  Location: [facility];  Service: Interventional Radiology    TONSILLECTOMY      WHIPPLE PROCEDURE W/ LAPAROSCOPY         Family History:    Family History   Problem Relation Age of Onset    Cancer Father        Cancer-related family history includes Cancer in her father  Social History:   Social History     Social History    Marital status:      Spouse name: N/A    Number of children: N/A    Years of education: N/A     Occupational History    Not on file       Social History Main Topics    Smoking status: Current Every Day Smoker     Packs/day: 0 25    Smokeless tobacco: Never Used      Comment: trying to quit    Alcohol use No    Drug use: Yes     Types: Marijuana      Comment: 1 month    Sexual activity: Not Currently     Other Topics Concern    Not on file     Social History Narrative    Wears seatbelt    No living will    Denies exercise habits    Regular dental care        Current Medications:   Current Outpatient Prescriptions   Medication Sig Dispense Refill    diazepam (VALIUM) 5 mg tablet Take 1 tablet (5 mg total) by mouth every 12 (twelve) hours as needed for anxiety 30 tablet 0    diazepam (VALIUM) 5 mg tablet TAKE ONE TABLET BY MOUTH EVERY TWELVE HOURS AS NEEDED FOR anxiety 60 tablet 2    divalproex sodium (DEPAKOTE) 125 mg EC tablet 1 tab in am and 2 tabs at bedtime 90 tablet 3    FLUoxetine (PROzac) 20 MG tablet TAKE TWO TABLETS BY MOUTH DAILY 60 tablet 3    FLUoxetine (PROzac) 40 MG capsule Take 1 capsule (40 mg total) by mouth daily 30 capsule 3    gabapentin (NEURONTIN) 300 mg capsule Take 1 capsule (300 mg total) by mouth daily at bedtime 30 capsule 0    ibuprofen (MOTRIN) 800 mg tablet       LORazepam (ATIVAN) 0 5 mg tablet Take 1 tablet (0 5 mg total) by mouth every 8 (eight) hours as needed for anxiety 90 tablet 3    oxyCODONE (ROXICODONE) 5 mg immediate release tablet 1 tab every 4 hours as needed for pain and 2 tabs at bedtime 100 tablet 0    pancrelipase, Lip-Prot-Amyl, (CREON) 12,000 units capsule Take 12,000 units of lipase by mouth 3 (three) times a day with meals for 180 doses 180 capsule 3    prochlorperazine (COMPAZINE) 10 mg tablet Take 1 tablet (10 mg total) by mouth every 6 (six) hours as needed for nausea or vomiting 30 tablet 1     No current facility-administered medications for this visit  Facility-Administered Medications Ordered in Other Visits   Medication Dose Route Frequency Provider Last Rate Last Dose    heparin lock flush 100 units/mL injection 300 Units  300 Units Intracatheter Once Faylene Asp, DO           Allergies: Allergies   Allergen Reactions    Codeine Other (See Comments)     Feels crazy when taking it       Physical Exam:    There is no height or weight on file to calculate BSA      Wt Readings from Last 3 Encounters:   11/13/18 48 5 kg (106 lb 14 8 oz)   11/12/18 49 kg (108 lb)   10/30/18 48 5 kg (106 lb 14 8 oz)        Temp Readings from Last 3 Encounters:   11/13/18 97 5 °F (36 4 °C) (Tympanic) 11/12/18 97 8 °F (36 6 °C) (Tympanic)   10/30/18 98 2 °F (36 8 °C) (Tympanic)        BP Readings from Last 3 Encounters:   11/13/18 107/63   11/12/18 110/60   10/30/18 101/56         Pulse Readings from Last 3 Encounters:   11/13/18 82   11/12/18 95   10/30/18 89        Physical Exam     Constitutional   General appearance: No acute distress, well appearing and well nourished  Eyes   Conjunctiva and lids: No swelling, erythema or discharge  Pupils and irises: Equal, round and reactive to light  Ears, Nose, Mouth, and Throat   External inspection of ears and nose: Normal     Nasal mucosa, septum, and turbinates: Normal without edema or erythema  Oropharynx: Normal with no erythema, edema, exudate or lesions  Pulmonary   Respiratory effort: No increased work of breathing or signs of respiratory distress  Auscultation of lungs: Clear to auscultation  Cardiovascular   Palpation of heart: Normal PMI, no thrills  Auscultation of heart: Normal rate and rhythm, normal S1 and S2, without murmurs  Examination of extremities for edema and/or varicosities: Normal     Carotid pulses: Normal     Abdomen   Abdomen: Non-tender, no masses  Liver and spleen: No hepatomegaly or splenomegaly  Lymphatic   Palpation of lymph nodes in neck: No lymphadenopathy  Musculoskeletal   Gait and station: Normal     Digits and nails: Normal without clubbing or cyanosis  Inspection/palpation of joints, bones, and muscles: Normal     Skin   Skin and subcutaneous tissue: Normal without rashes or lesions  Neurologic   Cranial nerves: Cranial nerves 2-12 intact  Sensation: No sensory loss      Psychiatric   Orientation to person, place, and time: Normal     Mood and affect: Normal         Assessment / Plan:      The patient is a pleasant 62-year-old female who had a resection and while on adjuvant chemotherapy with Xeloda and gemcitabine developed a rising CA-19-9 which eventually led to the diagnosis of her liver metastases which was biopsy-proven  I put her on modified FOLFOX 6 and we discontinued the leucovorin bolus because of side effects  5-FU bolus was also discontinued  Her CA-19-9 continues to go up  I imaged her and her liver lesion is growing  This has been biopsy proven to be pancreatic cancer in the past she also has more symptoms  The growth was just a few millimeters with this combined with a rising CA-19-9 and her symptoms where she is not feeling better but worse indicates she is not having a response and is progressing  I will continue the 5-fluorouracil but now port her on liposomal CPT-11  I went over the risks benefits and alternatives of Onyvide  I explained the potential side effects to include but not limited to low blood counts, diarrhea, fatigue, oral mucositis, hair loss  The patient is agreeable to proceed  I will set her up to start this  She is moving to [place]  She will continue her care there when she arrives  he will start her new regimen next week and then continue in [place]  Dominik [place] will be at 70 mg/m²  5-FU will stay at 2400 mg/m²  Goals and Barriers:  Current Goal:  Prolong Survival from Pancreatic cancer  Barriers: None  Patient's Capacity to Self Care:  Patient  able to self care  Portions of the record may have been created with voice recognition software   Occasional wrong word or "sound a like" substitutions may have occurred due to the inherent limitations of voice recognition software   Read the chart carefully and recognize, using context, where substitutions have occurred

## 2018-11-25 RX ORDER — ATROPINE SULFATE 1 MG/ML
0.25 INJECTION, SOLUTION INTRAMUSCULAR; INTRAVENOUS; SUBCUTANEOUS ONCE
Status: DISCONTINUED | OUTPATIENT
Start: 2018-11-27 | End: 2018-11-30 | Stop reason: HOSPADM

## 2018-11-26 ENCOUNTER — TELEPHONE (OUTPATIENT)
Dept: HEMATOLOGY ONCOLOGY | Facility: HOSPITAL | Age: 63
End: 2018-11-26

## 2018-11-26 NOTE — PROGRESS NOTES
Called infusion center at [facility] to time out order for tomorrow  Infusion center closed  Responded to email from Shara Naranjo RN and Kamaljit Dent RN  Patient moving to [place]  Not continuing treatment in [place]

## 2018-11-27 ENCOUNTER — HOSPITAL ENCOUNTER (OUTPATIENT)
Dept: INFUSION CENTER | Facility: HOSPITAL | Age: 63
Discharge: HOME | End: 2018-11-27

## 2018-11-27 ENCOUNTER — DOCUMENTATION (OUTPATIENT)
Dept: INFUSION CENTER | Facility: HOSPITAL | Age: 63
End: 2018-11-27

## 2018-11-27 DIAGNOSIS — C25.2 MALIGNANT NEOPLASM OF TAIL OF PANCREAS (HCC): Primary | ICD-10-CM

## 2018-11-29 ENCOUNTER — HOSPITAL ENCOUNTER (OUTPATIENT)
Dept: INFUSION CENTER | Facility: HOSPITAL | Age: 63
End: 2018-11-29

## 2018-12-27 ENCOUNTER — TELEPHONE (OUTPATIENT)
Dept: FAMILY MEDICINE CLINIC | Facility: HOSPITAL | Age: 63
End: 2018-12-27

## 2018-12-27 NOTE — TELEPHONE ENCOUNTER
SPOKE TO DR Wandalee Dancer - WE WILL CANCEL ORDERS PATIENT WAS A NO SHOW - DR SCOTT DID NOT HAVE A FOLLOW UP APPT  WITH PATIENT  NOTICE SHE HAD AN APPT  WITH DR Ogden [address] [place] [address]

## 2019-01-03 ENCOUNTER — TELEPHONE (OUTPATIENT)
Dept: PALLIATIVE MEDICINE | Facility: CLINIC | Age: 64
End: 2019-01-03

## 2019-01-04 ENCOUNTER — TELEPHONE (OUTPATIENT)
Dept: PALLIATIVE MEDICINE | Facility: CLINIC | Age: 64
End: 2019-01-04

## 2019-01-04 NOTE — TELEPHONE ENCOUNTER
Dr Pa Norris provided prescription for Creon and was mailed to patient  Phone call made to patient to make her aware

## 2019-03-06 ENCOUNTER — TELEPHONE (OUTPATIENT)
Dept: HEMATOLOGY ONCOLOGY | Facility: CLINIC | Age: 64
End: 2019-03-06

## 2019-10-29 NOTE — TELEPHONE ENCOUNTER
Pt states she would like a prescription to address neuropathy pain  No available appts until 12/11  Yes

## 2021-07-17 NOTE — TELEPHONE ENCOUNTER
Can you generate script - let me know when done so I can pre-cert and then notify patient
Orders done  I talked to ds about this 
WAITING FOR PATIENT TO GIVE ME NEW CORRECT INSURANCE CARD - SHE WILL DOWNLOAD AND BRING ME A COPY
Yes, it is OK to order lumbar and thoracic MRI 
Warm

## 2022-01-07 NOTE — PROGRESS NOTES
Assessment/Plan:    No problem-specific Assessment & Plan notes found for this encounter  Diagnoses and all orders for this visit:    Malignant neoplasm of tail of pancreas (HCC)    TMJ (temporomandibular joint syndrome)    Anxiety about health  -     LORazepam (ATIVAN) 0 5 mg tablet; Take 1 tablet (0 5 mg total) by mouth every 8 (eight) hours as needed for anxiety    Arthritis of right acromioclavicular joint  -     ibuprofen (MOTRIN) 800 mg tablet; Take 1 tablet (800 mg total) by mouth every 8 (eight) hours as needed for moderate pain  -     Uric acid; Future  -     C-reactive protein; Future          Subjective:      Patient ID: Kinza Palm is a 65 y o  female  Bruno Cruz returns in outpatient follow-up  PDMP query shows no concerns  She had a port placed on the right and she had chemotherapy  She had a tooth pulled as well  She then spiked a fever to 101 and was seen in the ED with redness around port site and right shoulder  After discussion with Dr Antonella Dias, it was decided to treat with antibiotics and leave the port in place  Fevers have resolved, however she is aching with increased back pain and persistent right shoulder redness and pain with very limited motion  She does acknowledge a day of very heavy physical activity at work preceding symptoms  Her TMJ is better after acupuncture  She is tolerating her depakote  Mood a little better  The following portions of the patient's history were reviewed and updated as appropriate: allergies, current medications, past medical history, past social history, past surgical history and problem list     Review of Systems   Constitutional: Positive for fatigue  Negative for diaphoresis, fever and unexpected weight change  HENT: Negative  Eyes: Negative  Respiratory: Negative  Cardiovascular: Negative  Gastrointestinal: Negative  Endocrine: Negative  Genitourinary: Negative      Musculoskeletal: Positive for arthralgias, back pain and joint Patient notified. I do not have privileges to perform laser surgery at [facility], so case will need to be cancelled. Recommend trial of Aldara and follow up one month.   Also offered to see if there is a different gynecologist on staff at [facility] beata swelling  Skin: Positive for rash  Allergic/Immunologic: Negative  Neurological: Negative  Hematological: Negative  Psychiatric/Behavioral: Positive for dysphoric mood  The patient is nervous/anxious  Objective:      /60 (BP Location: Right arm, Patient Position: Sitting, Cuff Size: Standard)   Pulse 90   Temp 98 3 °F (36 8 °C) (Oral)   Resp 18   Ht 5' 2" (1 575 m)   Wt 46 2 kg (101 lb 14 4 oz)   LMP  (LMP Unknown)   SpO2 90%   BMI 18 64 kg/m²          Physical Exam   Constitutional: She is oriented to person, place, and time  She appears well-developed and well-nourished  No distress  HENT:   Head: Normocephalic and atraumatic  Right Ear: External ear normal    Left Ear: External ear normal    Nose: Nose normal    Mouth/Throat: Oropharynx is clear and moist    Eyes: Conjunctivae and EOM are normal  Pupils are equal, round, and reactive to light  Right eye exhibits no discharge  Left eye exhibits no discharge  No scleral icterus  Neck: Normal range of motion  Neck supple  No JVD present  No tracheal deviation present  No thyromegaly present  Cardiovascular: Normal rate, regular rhythm, normal heart sounds and intact distal pulses  Pulmonary/Chest: Effort normal and breath sounds normal  No stridor  Abdominal: Soft  Bowel sounds are normal  There is tenderness  Musculoskeletal:        Right shoulder: She exhibits decreased range of motion, tenderness, bony tenderness, swelling, effusion and pain  She exhibits no crepitus, no deformity, no laceration, no spasm, normal pulse and normal strength  Arms:  Lymphadenopathy:     She has no cervical adenopathy  Neurological: She is alert and oriented to person, place, and time  She has normal reflexes  She displays normal reflexes  No cranial nerve deficit  She exhibits normal muscle tone  Coordination normal    Skin: Skin is warm and dry  No rash noted  She is not diaphoretic  There is erythema  No pallor  Psychiatric: She has a normal mood and affect  Her behavior is normal  Judgment and thought content normal    Vitals reviewed

## 2023-09-07 NOTE — PLAN OF CARE
Problem: Potential for Falls  Goal: Patient will remain free of falls  INTERVENTIONS:  - Assess patient frequently for physical needs  -  Identify cognitive and physical deficits and behaviors that affect risk of falls    -  [place] fall precautions as indicated by assessment   - Educate patient/family on patient safety including physical limitations  - Instruct patient to call for assistance with activity based on assessment  - Modify environment to reduce risk of injury  - Consider OT/PT consult to assist with strengthening/mobility   Outcome: Progressing This is day 1 of an event monitor for 30 days.  I reviewed rhythm strip.  No further testing continue with event monitor and please reach out to the patient and see if was symptomatic during this episode thank you

## (undated) DEVICE — STRL UNIVERSAL MINOR GENERAL: Brand: CARDINAL HEALTH

## (undated) DEVICE — INTENDED FOR TISSUE SEPARATION, AND OTHER PROCEDURES THAT REQUIRE A SHARP SURGICAL BLADE TO PUNCTURE OR CUT.: Brand: BARD-PARKER SAFETY BLADES SIZE 15, STERILE

## (undated) DEVICE — 3M™ TEGADERM™ TRANSPARENT FILM DRESSING FRAME STYLE, 1626W, 4 IN X 4-3/4 IN (10 CM X 12 CM), 50/CT 4CT/CASE: Brand: 3M™ TEGADERM™

## (undated) DEVICE — NEEDLE 25G X 1 1/2

## (undated) DEVICE — SUT VICRYL 3-0 SH 27 IN J416H

## (undated) DEVICE — LIGHT GLOVE GREEN

## (undated) DEVICE — ULTRASOUND GEL STERILE FOIL PK

## (undated) DEVICE — MINOR PROCEDURE DRAPE: Brand: CONVERTORS

## (undated) DEVICE — ADHESIVE SKN CLSR HISTOACRYL FLEX 0.5ML LF

## (undated) DEVICE — BAG DECANTER

## (undated) DEVICE — VIAL DECANTER

## (undated) DEVICE — FINE NEEDLE BIOPSY SYSTEM: Brand: SHARKCORE

## (undated) DEVICE — GLOVE INDICATOR PI UNDERGLOVE SZ 7 BLUE

## (undated) DEVICE — CHLORAPREP HI-LITE 10.5ML ORANGE

## (undated) DEVICE — DRAPE C-ARM X-RAY

## (undated) DEVICE — DRAPE TOWEL: Brand: CONVERTORS

## (undated) DEVICE — GAUZE SPONGES,16 PLY: Brand: CURITY

## (undated) DEVICE — COVER PROBE INTRAOPERATIVE 6 X 96 IN

## (undated) DEVICE — LIGHT HANDLE COVER SLEEVE DISP BLUE STELLAR

## (undated) DEVICE — GAUZE SPONGES,USP TYPE VII GAUZE, 12 PLY: Brand: CURITY

## (undated) DEVICE — SYRINGE 5ML LL

## (undated) DEVICE — GLOVE SRG BIOGEL 6.5